# Patient Record
Sex: FEMALE | Race: WHITE | Employment: FULL TIME | URBAN - METROPOLITAN AREA
[De-identification: names, ages, dates, MRNs, and addresses within clinical notes are randomized per-mention and may not be internally consistent; named-entity substitution may affect disease eponyms.]

---

## 2017-01-16 ENCOUNTER — OFFICE VISIT (OUTPATIENT)
Dept: SLEEP MEDICINE | Age: 62
End: 2017-01-16

## 2017-01-16 VITALS
HEIGHT: 66 IN | SYSTOLIC BLOOD PRESSURE: 110 MMHG | DIASTOLIC BLOOD PRESSURE: 80 MMHG | BODY MASS INDEX: 37.28 KG/M2 | OXYGEN SATURATION: 94 % | HEART RATE: 85 BPM | WEIGHT: 232 LBS

## 2017-01-16 DIAGNOSIS — J44.9 COPD, SEVERE (HCC): Chronic | ICD-10-CM

## 2017-01-16 DIAGNOSIS — E66.9 NON MORBID OBESITY, UNSPECIFIED OBESITY TYPE: Chronic | ICD-10-CM

## 2017-01-16 DIAGNOSIS — G47.33 OSA (OBSTRUCTIVE SLEEP APNEA): Primary | Chronic | ICD-10-CM

## 2017-01-16 DIAGNOSIS — I42.9 CARDIOMYOPATHY (HCC): Chronic | ICD-10-CM

## 2017-01-16 PROCEDURE — 99214 OFFICE O/P EST MOD 30 MIN: CPT | Performed by: NURSE PRACTITIONER

## 2017-01-16 ASSESSMENT — ENCOUNTER SYMPTOMS
ABDOMINAL DISTENTION: 0
APNEA: 0
SINUS PRESSURE: 0
SHORTNESS OF BREATH: 0
ABDOMINAL PAIN: 0
RHINORRHEA: 0
COUGH: 0

## 2017-01-16 ASSESSMENT — SLEEP AND FATIGUE QUESTIONNAIRES
HOW LIKELY ARE YOU TO NOD OFF OR FALL ASLEEP IN A CAR, WHILE STOPPED FOR A FEW MINUTES IN TRAFFIC: 0
HOW LIKELY ARE YOU TO NOD OFF OR FALL ASLEEP WHILE SITTING QUIETLY AFTER LUNCH WITHOUT ALCOHOL: 0
HOW LIKELY ARE YOU TO NOD OFF OR FALL ASLEEP WHEN YOU ARE A PASSENGER IN A CAR FOR AN HOUR WITHOUT A BREAK: 1
HOW LIKELY ARE YOU TO NOD OFF OR FALL ASLEEP WHILE LYING DOWN TO REST IN THE AFTERNOON WHEN CIRCUMSTANCES PERMIT: 1
HOW LIKELY ARE YOU TO NOD OFF OR FALL ASLEEP WHILE SITTING INACTIVE IN A PUBLIC PLACE: 0
HOW LIKELY ARE YOU TO NOD OFF OR FALL ASLEEP WHILE SITTING AND READING: 0
ESS TOTAL SCORE: 3
HOW LIKELY ARE YOU TO NOD OFF OR FALL ASLEEP WHILE WATCHING TV: 1
HOW LIKELY ARE YOU TO NOD OFF OR FALL ASLEEP WHILE SITTING AND TALKING TO SOMEONE: 0

## 2017-01-24 ENCOUNTER — OFFICE VISIT (OUTPATIENT)
Dept: CARDIOLOGY CLINIC | Age: 62
End: 2017-01-24

## 2017-01-24 ENCOUNTER — PROCEDURE VISIT (OUTPATIENT)
Dept: CARDIOLOGY CLINIC | Age: 62
End: 2017-01-24

## 2017-01-24 ENCOUNTER — HOSPITAL ENCOUNTER (OUTPATIENT)
Dept: OTHER | Age: 62
Discharge: HOME | End: 2017-01-24
Attending: INTERNAL MEDICINE | Admitting: INTERNAL MEDICINE

## 2017-01-24 VITALS
WEIGHT: 231 LBS | SYSTOLIC BLOOD PRESSURE: 108 MMHG | BODY MASS INDEX: 37.12 KG/M2 | HEIGHT: 66 IN | DIASTOLIC BLOOD PRESSURE: 80 MMHG | HEART RATE: 76 BPM

## 2017-01-24 DIAGNOSIS — Z87.74 TETRALOGY OF FALLOT S/P REPAIR: Chronic | ICD-10-CM

## 2017-01-24 DIAGNOSIS — I50.22 CHRONIC SYSTOLIC CHF (CONGESTIVE HEART FAILURE) (HCC): Chronic | ICD-10-CM

## 2017-01-24 DIAGNOSIS — G47.33 OSA TREATED WITH BIPAP: Chronic | ICD-10-CM

## 2017-01-24 DIAGNOSIS — I50.22 CHRONIC SYSTOLIC CHF (CONGESTIVE HEART FAILURE) (HCC): Primary | Chronic | ICD-10-CM

## 2017-01-24 DIAGNOSIS — Z95.810 AUTOMATIC IMPLANTABLE CARDIOVERTER-DEFIBRILLATOR IN SITU: Chronic | ICD-10-CM

## 2017-01-24 DIAGNOSIS — J44.9 COPD, SEVERE (HCC): Chronic | ICD-10-CM

## 2017-01-24 DIAGNOSIS — Z95.810 AUTOMATIC IMPLANTABLE CARDIOVERTER-DEFIBRILLATOR IN SITU: ICD-10-CM

## 2017-01-24 DIAGNOSIS — I42.9 CARDIOMYOPATHY (HCC): Chronic | ICD-10-CM

## 2017-01-24 DIAGNOSIS — I50.22 CHRONIC SYSTOLIC CHF (CONGESTIVE HEART FAILURE) (HCC): ICD-10-CM

## 2017-01-24 LAB
A/G RATIO: 1 (ref 1.1–2.2)
ALBUMIN SERPL-MCNC: 3.6 G/DL (ref 3.4–5)
ALP BLD-CCNC: 119 U/L (ref 40–129)
ALT SERPL-CCNC: 16 U/L (ref 10–40)
ANION GAP SERPL CALCULATED.3IONS-SCNC: 23 MMOL/L (ref 3–16)
AST SERPL-CCNC: 14 U/L (ref 15–37)
BASOPHILS ABSOLUTE: 0.1 K/UL (ref 0–0.2)
BASOPHILS RELATIVE PERCENT: 1.3 %
BILIRUB SERPL-MCNC: 0.4 MG/DL (ref 0–1)
BUN BLDV-MCNC: 10 MG/DL (ref 7–20)
CALCIUM SERPL-MCNC: 9 MG/DL (ref 8.3–10.6)
CHLORIDE BLD-SCNC: 100 MMOL/L (ref 99–110)
CO2: 18 MMOL/L (ref 21–32)
CREAT SERPL-MCNC: 0.9 MG/DL (ref 0.6–1.2)
EOSINOPHILS ABSOLUTE: 0.4 K/UL (ref 0–0.6)
EOSINOPHILS RELATIVE PERCENT: 3.6 %
GFR AFRICAN AMERICAN: >60
GFR NON-AFRICAN AMERICAN: >60
GLOBULIN: 3.5 G/DL
GLUCOSE BLD-MCNC: 119 MG/DL (ref 70–99)
HCT VFR BLD CALC: 44.7 % (ref 36–48)
HEMOGLOBIN: 14.6 G/DL (ref 12–16)
LYMPHOCYTES ABSOLUTE: 2.3 K/UL (ref 1–5.1)
LYMPHOCYTES RELATIVE PERCENT: 20.5 %
MCH RBC QN AUTO: 28.3 PG (ref 26–34)
MCHC RBC AUTO-ENTMCNC: 32.6 G/DL (ref 31–36)
MCV RBC AUTO: 86.7 FL (ref 80–100)
MONOCYTES ABSOLUTE: 0.9 K/UL (ref 0–1.3)
MONOCYTES RELATIVE PERCENT: 7.9 %
NEUTROPHILS ABSOLUTE: 7.4 K/UL (ref 1.7–7.7)
NEUTROPHILS RELATIVE PERCENT: 66.7 %
PDW BLD-RTO: 14.2 % (ref 12.4–15.4)
PLATELET # BLD: 221 K/UL (ref 135–450)
PMV BLD AUTO: 8.8 FL (ref 5–10.5)
POTASSIUM SERPL-SCNC: 4.4 MMOL/L (ref 3.5–5.1)
PRO-BNP: 307 PG/ML (ref 0–124)
RBC # BLD: 5.15 M/UL (ref 4–5.2)
SODIUM BLD-SCNC: 141 MMOL/L (ref 136–145)
TOTAL PROTEIN: 7.1 G/DL (ref 6.4–8.2)
WBC # BLD: 11.1 K/UL (ref 4–11)

## 2017-01-24 PROCEDURE — 93290 INTERROG DEV EVAL ICPMS IP: CPT | Performed by: INTERNAL MEDICINE

## 2017-01-24 PROCEDURE — 99214 OFFICE O/P EST MOD 30 MIN: CPT | Performed by: INTERNAL MEDICINE

## 2017-01-24 PROCEDURE — 93289 INTERROG DEVICE EVAL HEART: CPT | Performed by: INTERNAL MEDICINE

## 2017-01-24 RX ORDER — CARVEDILOL 12.5 MG/1
TABLET ORAL
Qty: 180 TABLET | Refills: 3 | Status: SHIPPED | OUTPATIENT
Start: 2017-01-24 | End: 2018-02-12 | Stop reason: SDUPTHER

## 2017-01-24 RX ORDER — VALSARTAN 320 MG/1
320 TABLET ORAL NIGHTLY
Qty: 90 TABLET | Refills: 3
Start: 2017-01-24 | End: 2017-03-07 | Stop reason: SDUPTHER

## 2017-01-26 ENCOUNTER — TELEPHONE (OUTPATIENT)
Dept: CARDIOLOGY CLINIC | Age: 62
End: 2017-01-26

## 2017-01-30 ENCOUNTER — TELEPHONE (OUTPATIENT)
Dept: PULMONOLOGY | Age: 62
End: 2017-01-30

## 2017-01-30 RX ORDER — DOXYCYCLINE HYCLATE 100 MG/1
100 CAPSULE ORAL 2 TIMES DAILY
Qty: 20 CAPSULE | Refills: 0 | Status: SHIPPED | OUTPATIENT
Start: 2017-01-30 | End: 2017-02-09

## 2017-02-14 ENCOUNTER — TELEPHONE (OUTPATIENT)
Dept: CARDIOLOGY CLINIC | Age: 62
End: 2017-02-14

## 2017-02-22 ENCOUNTER — NURSE ONLY (OUTPATIENT)
Dept: CARDIOLOGY CLINIC | Age: 62
End: 2017-02-22

## 2017-02-22 DIAGNOSIS — Z95.810 CARDIAC DEFIBRILLATOR IN PLACE: ICD-10-CM

## 2017-02-22 DIAGNOSIS — I50.22 CHRONIC SYSTOLIC CHF (CONGESTIVE HEART FAILURE) (HCC): Chronic | ICD-10-CM

## 2017-02-22 DIAGNOSIS — I42.9 CARDIOMYOPATHY (HCC): Chronic | ICD-10-CM

## 2017-02-22 DIAGNOSIS — Z95.810 AUTOMATIC IMPLANTABLE CARDIOVERTER-DEFIBRILLATOR IN SITU: Chronic | ICD-10-CM

## 2017-02-22 PROCEDURE — 93296 REM INTERROG EVL PM/IDS: CPT | Performed by: INTERNAL MEDICINE

## 2017-02-22 PROCEDURE — 93297 REM INTERROG DEV EVAL ICPMS: CPT | Performed by: INTERNAL MEDICINE

## 2017-02-22 PROCEDURE — 93295 DEV INTERROG REMOTE 1/2/MLT: CPT | Performed by: INTERNAL MEDICINE

## 2017-03-02 RX ORDER — VALSARTAN 320 MG/1
TABLET ORAL
Qty: 90 TABLET | Refills: 3 | Status: SHIPPED | OUTPATIENT
Start: 2017-03-02 | End: 2018-02-12 | Stop reason: SDUPTHER

## 2017-03-07 ENCOUNTER — OFFICE VISIT (OUTPATIENT)
Dept: CARDIOLOGY CLINIC | Age: 62
End: 2017-03-07

## 2017-03-07 VITALS
WEIGHT: 231 LBS | DIASTOLIC BLOOD PRESSURE: 86 MMHG | HEART RATE: 78 BPM | BODY MASS INDEX: 37.12 KG/M2 | HEIGHT: 66 IN | RESPIRATION RATE: 89 BRPM | SYSTOLIC BLOOD PRESSURE: 116 MMHG

## 2017-03-07 DIAGNOSIS — Z87.74 TETRALOGY OF FALLOT S/P REPAIR: Chronic | ICD-10-CM

## 2017-03-07 DIAGNOSIS — E78.49 OTHER HYPERLIPIDEMIA: ICD-10-CM

## 2017-03-07 DIAGNOSIS — R06.02 SOB (SHORTNESS OF BREATH): ICD-10-CM

## 2017-03-07 DIAGNOSIS — I50.22 CHRONIC SYSTOLIC CHF (CONGESTIVE HEART FAILURE) (HCC): Primary | ICD-10-CM

## 2017-03-07 DIAGNOSIS — Z95.810 AUTOMATIC IMPLANTABLE CARDIOVERTER-DEFIBRILLATOR IN SITU: ICD-10-CM

## 2017-03-07 PROCEDURE — 93290 INTERROG DEV EVAL ICPMS IP: CPT | Performed by: INTERNAL MEDICINE

## 2017-03-07 PROCEDURE — 99214 OFFICE O/P EST MOD 30 MIN: CPT | Performed by: INTERNAL MEDICINE

## 2017-03-11 ENCOUNTER — HOSPITAL ENCOUNTER (OUTPATIENT)
Dept: OTHER | Age: 62
Discharge: HOME | End: 2017-03-11
Attending: INTERNAL MEDICINE | Admitting: INTERNAL MEDICINE

## 2017-03-11 DIAGNOSIS — I50.22 CHRONIC SYSTOLIC CHF (CONGESTIVE HEART FAILURE) (HCC): ICD-10-CM

## 2017-03-11 DIAGNOSIS — G47.33 OSA TREATED WITH BIPAP: Chronic | ICD-10-CM

## 2017-03-11 DIAGNOSIS — E78.49 OTHER HYPERLIPIDEMIA: ICD-10-CM

## 2017-03-11 DIAGNOSIS — E11.9 TYPE 2 DIABETES MELLITUS WITHOUT COMPLICATION, WITHOUT LONG-TERM CURRENT USE OF INSULIN (HCC): ICD-10-CM

## 2017-03-11 DIAGNOSIS — Z95.810 AUTOMATIC IMPLANTABLE CARDIOVERTER-DEFIBRILLATOR IN SITU: ICD-10-CM

## 2017-03-11 DIAGNOSIS — R06.02 SOB (SHORTNESS OF BREATH): ICD-10-CM

## 2017-03-11 LAB
A/G RATIO: 1.3 (ref 1.1–2.2)
ALBUMIN SERPL-MCNC: 3.9 G/DL (ref 3.4–5)
ALP BLD-CCNC: 113 U/L (ref 40–129)
ALT SERPL-CCNC: 18 U/L (ref 10–40)
ANION GAP SERPL CALCULATED.3IONS-SCNC: 13 MMOL/L (ref 3–16)
AST SERPL-CCNC: 17 U/L (ref 15–37)
BILIRUB SERPL-MCNC: 0.8 MG/DL (ref 0–1)
BUN BLDV-MCNC: 10 MG/DL (ref 7–20)
CALCIUM SERPL-MCNC: 9.3 MG/DL (ref 8.3–10.6)
CHLORIDE BLD-SCNC: 101 MMOL/L (ref 99–110)
CHOLESTEROL, TOTAL: 129 MG/DL (ref 0–199)
CO2: 26 MMOL/L (ref 21–32)
CREAT SERPL-MCNC: 0.9 MG/DL (ref 0.6–1.2)
GFR AFRICAN AMERICAN: >60
GFR NON-AFRICAN AMERICAN: >60
GLOBULIN: 2.9 G/DL
GLUCOSE BLD-MCNC: 146 MG/DL (ref 70–99)
HCT VFR BLD CALC: 43.6 % (ref 36–48)
HDLC SERPL-MCNC: 48 MG/DL (ref 40–60)
HEMOGLOBIN: 14.3 G/DL (ref 12–16)
LDL CHOLESTEROL CALCULATED: 56 MG/DL
MCH RBC QN AUTO: 28.6 PG (ref 26–34)
MCHC RBC AUTO-ENTMCNC: 32.8 G/DL (ref 31–36)
MCV RBC AUTO: 87.2 FL (ref 80–100)
PDW BLD-RTO: 14 % (ref 12.4–15.4)
PLATELET # BLD: 211 K/UL (ref 135–450)
PMV BLD AUTO: 8.9 FL (ref 5–10.5)
POTASSIUM SERPL-SCNC: 4.4 MMOL/L (ref 3.5–5.1)
PRO-BNP: 549 PG/ML (ref 0–124)
RBC # BLD: 5 M/UL (ref 4–5.2)
SODIUM BLD-SCNC: 140 MMOL/L (ref 136–145)
TOTAL PROTEIN: 6.8 G/DL (ref 6.4–8.2)
TRIGL SERPL-MCNC: 127 MG/DL (ref 0–150)
VLDLC SERPL CALC-MCNC: 25 MG/DL
WBC # BLD: 8.7 K/UL (ref 4–11)

## 2017-03-12 LAB
ESTIMATED AVERAGE GLUCOSE: 159.9 MG/DL
HBA1C MFR BLD: 7.2 %

## 2017-03-13 ENCOUNTER — TELEPHONE (OUTPATIENT)
Dept: CARDIOLOGY CLINIC | Age: 62
End: 2017-03-13

## 2017-04-05 RX ORDER — FLUTICASONE FUROATE AND VILANTEROL 200; 25 UG/1; UG/1
POWDER RESPIRATORY (INHALATION)
Qty: 60 EACH | Refills: 11 | Status: SHIPPED | OUTPATIENT
Start: 2017-04-05 | End: 2018-07-09 | Stop reason: SDUPTHER

## 2017-05-30 ENCOUNTER — NURSE ONLY (OUTPATIENT)
Dept: CARDIOLOGY CLINIC | Age: 62
End: 2017-05-30

## 2017-05-30 DIAGNOSIS — I50.22 CHRONIC SYSTOLIC CHF (CONGESTIVE HEART FAILURE) (HCC): Chronic | ICD-10-CM

## 2017-05-30 DIAGNOSIS — Z95.810 CARDIAC DEFIBRILLATOR IN PLACE: ICD-10-CM

## 2017-05-30 DIAGNOSIS — Z95.810 AUTOMATIC IMPLANTABLE CARDIOVERTER-DEFIBRILLATOR IN SITU: Chronic | ICD-10-CM

## 2017-05-30 DIAGNOSIS — I42.9 CARDIOMYOPATHY (HCC): Chronic | ICD-10-CM

## 2017-05-30 PROCEDURE — 93297 REM INTERROG DEV EVAL ICPMS: CPT | Performed by: INTERNAL MEDICINE

## 2017-05-30 PROCEDURE — 93295 DEV INTERROG REMOTE 1/2/MLT: CPT | Performed by: INTERNAL MEDICINE

## 2017-05-30 PROCEDURE — 93296 REM INTERROG EVL PM/IDS: CPT | Performed by: INTERNAL MEDICINE

## 2017-06-19 ENCOUNTER — OFFICE VISIT (OUTPATIENT)
Dept: PULMONOLOGY | Age: 62
End: 2017-06-19

## 2017-06-19 VITALS
BODY MASS INDEX: 36.32 KG/M2 | HEART RATE: 62 BPM | DIASTOLIC BLOOD PRESSURE: 70 MMHG | SYSTOLIC BLOOD PRESSURE: 126 MMHG | WEIGHT: 225 LBS

## 2017-06-19 DIAGNOSIS — J98.4 RESTRICTIVE LUNG DISEASE: ICD-10-CM

## 2017-06-19 DIAGNOSIS — G47.33 OSA TREATED WITH BIPAP: Chronic | ICD-10-CM

## 2017-06-19 DIAGNOSIS — J44.9 COPD, SEVERE (HCC): Primary | Chronic | ICD-10-CM

## 2017-06-19 DIAGNOSIS — J98.6 ELEVATED DIAPHRAGM: ICD-10-CM

## 2017-06-19 DIAGNOSIS — R49.0 HOARSENESS OF VOICE: ICD-10-CM

## 2017-06-19 DIAGNOSIS — I42.9 CARDIOMYOPATHY (HCC): Chronic | ICD-10-CM

## 2017-06-19 PROCEDURE — 99214 OFFICE O/P EST MOD 30 MIN: CPT | Performed by: INTERNAL MEDICINE

## 2017-07-21 ENCOUNTER — HOSPITAL ENCOUNTER (OUTPATIENT)
Dept: OTHER | Age: 62
Discharge: HOME | End: 2017-07-21
Attending: CLINICAL NURSE SPECIALIST | Admitting: CLINICAL NURSE SPECIALIST

## 2017-07-21 ENCOUNTER — TELEPHONE (OUTPATIENT)
Dept: CARDIOLOGY CLINIC | Age: 62
End: 2017-07-21

## 2017-07-21 ENCOUNTER — OFFICE VISIT (OUTPATIENT)
Dept: CARDIOLOGY CLINIC | Age: 62
End: 2017-07-21

## 2017-07-21 ENCOUNTER — HOSPITAL ENCOUNTER (OUTPATIENT)
Dept: NON INVASIVE DIAGNOSTICS | Age: 62
Discharge: HOME | End: 2017-07-21
Attending: INTERNAL MEDICINE | Admitting: INTERNAL MEDICINE

## 2017-07-21 VITALS
RESPIRATION RATE: 15 BRPM | BODY MASS INDEX: 37.73 KG/M2 | DIASTOLIC BLOOD PRESSURE: 60 MMHG | OXYGEN SATURATION: 93 % | HEIGHT: 66 IN | WEIGHT: 234.8 LBS | SYSTOLIC BLOOD PRESSURE: 118 MMHG | HEART RATE: 64 BPM

## 2017-07-21 DIAGNOSIS — G47.33 OSA TREATED WITH BIPAP: Chronic | ICD-10-CM

## 2017-07-21 DIAGNOSIS — I50.22 CHRONIC SYSTOLIC CHF (CONGESTIVE HEART FAILURE) (HCC): Primary | ICD-10-CM

## 2017-07-21 DIAGNOSIS — Z87.74 TETRALOGY OF FALLOT S/P REPAIR: Chronic | ICD-10-CM

## 2017-07-21 DIAGNOSIS — I50.22 CHRONIC SYSTOLIC CHF (CONGESTIVE HEART FAILURE) (HCC): ICD-10-CM

## 2017-07-21 DIAGNOSIS — I50.22 CHRONIC SYSTOLIC CONGESTIVE HEART FAILURE (HCC): ICD-10-CM

## 2017-07-21 DIAGNOSIS — E78.2 MIXED HYPERLIPIDEMIA: ICD-10-CM

## 2017-07-21 DIAGNOSIS — Z95.810 AUTOMATIC IMPLANTABLE CARDIOVERTER-DEFIBRILLATOR IN SITU: ICD-10-CM

## 2017-07-21 LAB
ANION GAP SERPL CALCULATED.3IONS-SCNC: 14 MMOL/L (ref 3–16)
BUN BLDV-MCNC: 12 MG/DL (ref 7–20)
CALCIUM SERPL-MCNC: 9.1 MG/DL (ref 8.3–10.6)
CHLORIDE BLD-SCNC: 102 MMOL/L (ref 99–110)
CO2: 27 MMOL/L (ref 21–32)
CREAT SERPL-MCNC: 0.9 MG/DL (ref 0.6–1.2)
GFR AFRICAN AMERICAN: >60
GFR NON-AFRICAN AMERICAN: >60
GLUCOSE BLD-MCNC: 149 MG/DL (ref 70–99)
POTASSIUM SERPL-SCNC: 4 MMOL/L (ref 3.5–5.1)
PRO-BNP: 476 PG/ML (ref 0–124)
SODIUM BLD-SCNC: 143 MMOL/L (ref 136–145)

## 2017-07-21 PROCEDURE — 99214 OFFICE O/P EST MOD 30 MIN: CPT | Performed by: CLINICAL NURSE SPECIALIST

## 2017-07-21 PROCEDURE — 93290 INTERROG DEV EVAL ICPMS IP: CPT | Performed by: CLINICAL NURSE SPECIALIST

## 2017-08-14 RX ORDER — FUROSEMIDE 20 MG/1
TABLET ORAL
Qty: 180 TABLET | Refills: 3 | Status: SHIPPED | OUTPATIENT
Start: 2017-08-14 | End: 2017-10-17 | Stop reason: SDUPTHER

## 2017-08-16 RX ORDER — FUROSEMIDE 20 MG/1
TABLET ORAL
Qty: 60 TABLET | Refills: 5 | Status: SHIPPED | OUTPATIENT
Start: 2017-08-16 | End: 2019-04-12 | Stop reason: SDUPTHER

## 2017-08-21 ENCOUNTER — PROCEDURE VISIT (OUTPATIENT)
Dept: CARDIOLOGY CLINIC | Age: 62
End: 2017-08-21

## 2017-08-21 ENCOUNTER — OFFICE VISIT (OUTPATIENT)
Dept: CARDIOLOGY CLINIC | Age: 62
End: 2017-08-21

## 2017-08-21 VITALS
WEIGHT: 234 LBS | HEIGHT: 66 IN | SYSTOLIC BLOOD PRESSURE: 130 MMHG | DIASTOLIC BLOOD PRESSURE: 72 MMHG | HEART RATE: 70 BPM | BODY MASS INDEX: 37.61 KG/M2

## 2017-08-21 DIAGNOSIS — I10 ESSENTIAL HYPERTENSION: ICD-10-CM

## 2017-08-21 DIAGNOSIS — Z95.810 AUTOMATIC IMPLANTABLE CARDIOVERTER-DEFIBRILLATOR IN SITU: Chronic | ICD-10-CM

## 2017-08-21 DIAGNOSIS — I50.22 CHRONIC SYSTOLIC CHF (CONGESTIVE HEART FAILURE) (HCC): Chronic | ICD-10-CM

## 2017-08-21 DIAGNOSIS — I42.9 PRIMARY CARDIOMYOPATHY (HCC): Chronic | ICD-10-CM

## 2017-08-21 DIAGNOSIS — I42.8 NON-ISCHEMIC CARDIOMYOPATHY (HCC): Primary | ICD-10-CM

## 2017-08-21 DIAGNOSIS — Z87.74 TETRALOGY OF FALLOT S/P REPAIR: Chronic | ICD-10-CM

## 2017-08-21 DIAGNOSIS — E66.09 NON MORBID OBESITY DUE TO EXCESS CALORIES: ICD-10-CM

## 2017-08-21 PROCEDURE — 93290 INTERROG DEV EVAL ICPMS IP: CPT | Performed by: INTERNAL MEDICINE

## 2017-08-21 PROCEDURE — 99214 OFFICE O/P EST MOD 30 MIN: CPT | Performed by: INTERNAL MEDICINE

## 2017-08-21 PROCEDURE — 93284 PRGRMG EVAL IMPLANTABLE DFB: CPT | Performed by: INTERNAL MEDICINE

## 2017-10-17 ENCOUNTER — OFFICE VISIT (OUTPATIENT)
Dept: CARDIOLOGY CLINIC | Age: 62
End: 2017-10-17

## 2017-10-17 VITALS
HEART RATE: 76 BPM | SYSTOLIC BLOOD PRESSURE: 102 MMHG | WEIGHT: 231 LBS | DIASTOLIC BLOOD PRESSURE: 54 MMHG | BODY MASS INDEX: 37.12 KG/M2 | HEIGHT: 66 IN

## 2017-10-17 DIAGNOSIS — J44.9 COPD, SEVERE (HCC): Chronic | ICD-10-CM

## 2017-10-17 DIAGNOSIS — R06.02 SOB (SHORTNESS OF BREATH): ICD-10-CM

## 2017-10-17 DIAGNOSIS — Z87.74 TETRALOGY OF FALLOT S/P REPAIR: Chronic | ICD-10-CM

## 2017-10-17 DIAGNOSIS — E78.2 MIXED HYPERLIPIDEMIA: ICD-10-CM

## 2017-10-17 DIAGNOSIS — I50.22 CHRONIC SYSTOLIC CHF (CONGESTIVE HEART FAILURE) (HCC): Primary | ICD-10-CM

## 2017-10-17 DIAGNOSIS — Z95.810 AUTOMATIC IMPLANTABLE CARDIOVERTER-DEFIBRILLATOR IN SITU: ICD-10-CM

## 2017-10-17 PROCEDURE — 93290 INTERROG DEV EVAL ICPMS IP: CPT | Performed by: INTERNAL MEDICINE

## 2017-10-17 PROCEDURE — 99214 OFFICE O/P EST MOD 30 MIN: CPT | Performed by: INTERNAL MEDICINE

## 2017-10-20 ENCOUNTER — HOSPITAL ENCOUNTER (OUTPATIENT)
Dept: OTHER | Age: 62
Discharge: HOME | End: 2017-10-20
Attending: INTERNAL MEDICINE | Admitting: INTERNAL MEDICINE

## 2017-10-20 DIAGNOSIS — I50.22 CHRONIC SYSTOLIC CHF (CONGESTIVE HEART FAILURE) (HCC): ICD-10-CM

## 2017-10-20 DIAGNOSIS — E78.2 MIXED HYPERLIPIDEMIA: ICD-10-CM

## 2017-10-20 DIAGNOSIS — R06.02 SOB (SHORTNESS OF BREATH): ICD-10-CM

## 2017-10-20 LAB
A/G RATIO: 1.3 (ref 1.1–2.2)
ALBUMIN SERPL-MCNC: 3.9 G/DL (ref 3.4–5)
ALP BLD-CCNC: 107 U/L (ref 40–129)
ALT SERPL-CCNC: 18 U/L (ref 10–40)
ANION GAP SERPL CALCULATED.3IONS-SCNC: 15 MMOL/L (ref 3–16)
AST SERPL-CCNC: 18 U/L (ref 15–37)
BASOPHILS ABSOLUTE: 0.1 K/UL (ref 0–0.2)
BASOPHILS RELATIVE PERCENT: 1.2 %
BILIRUB SERPL-MCNC: 0.7 MG/DL (ref 0–1)
BUN BLDV-MCNC: 13 MG/DL (ref 7–20)
CALCIUM SERPL-MCNC: 9.1 MG/DL (ref 8.3–10.6)
CHLORIDE BLD-SCNC: 98 MMOL/L (ref 99–110)
CHOLESTEROL, TOTAL: 132 MG/DL (ref 0–199)
CO2: 26 MMOL/L (ref 21–32)
CREAT SERPL-MCNC: 0.9 MG/DL (ref 0.6–1.2)
EOSINOPHILS ABSOLUTE: 0.3 K/UL (ref 0–0.6)
EOSINOPHILS RELATIVE PERCENT: 3 %
GFR AFRICAN AMERICAN: >60
GFR NON-AFRICAN AMERICAN: >60
GLOBULIN: 3 G/DL
GLUCOSE BLD-MCNC: 154 MG/DL (ref 70–99)
HCT VFR BLD CALC: 46.2 % (ref 36–48)
HDLC SERPL-MCNC: 48 MG/DL (ref 40–60)
HEMOGLOBIN: 15 G/DL (ref 12–16)
LDL CHOLESTEROL CALCULATED: 57 MG/DL
LYMPHOCYTES ABSOLUTE: 1.8 K/UL (ref 1–5.1)
LYMPHOCYTES RELATIVE PERCENT: 21 %
MCH RBC QN AUTO: 28.7 PG (ref 26–34)
MCHC RBC AUTO-ENTMCNC: 32.4 G/DL (ref 31–36)
MCV RBC AUTO: 88.5 FL (ref 80–100)
MONOCYTES ABSOLUTE: 0.7 K/UL (ref 0–1.3)
MONOCYTES RELATIVE PERCENT: 8.1 %
NEUTROPHILS ABSOLUTE: 5.8 K/UL (ref 1.7–7.7)
NEUTROPHILS RELATIVE PERCENT: 66.7 %
PDW BLD-RTO: 14.4 % (ref 12.4–15.4)
PLATELET # BLD: 202 K/UL (ref 135–450)
PMV BLD AUTO: 8.9 FL (ref 5–10.5)
POTASSIUM SERPL-SCNC: 4.8 MMOL/L (ref 3.5–5.1)
PRO-BNP: 399 PG/ML (ref 0–124)
RBC # BLD: 5.21 M/UL (ref 4–5.2)
SODIUM BLD-SCNC: 139 MMOL/L (ref 136–145)
TOTAL PROTEIN: 6.9 G/DL (ref 6.4–8.2)
TRIGL SERPL-MCNC: 137 MG/DL (ref 0–150)
VLDLC SERPL CALC-MCNC: 27 MG/DL
WBC # BLD: 8.7 K/UL (ref 4–11)

## 2017-11-29 ENCOUNTER — TELEPHONE (OUTPATIENT)
Dept: PULMONOLOGY | Age: 62
End: 2017-11-29

## 2017-11-29 ENCOUNTER — NURSE ONLY (OUTPATIENT)
Dept: CARDIOLOGY CLINIC | Age: 62
End: 2017-11-29

## 2017-11-29 DIAGNOSIS — I50.22 CHRONIC SYSTOLIC CHF (CONGESTIVE HEART FAILURE) (HCC): Chronic | ICD-10-CM

## 2017-11-29 DIAGNOSIS — Z95.810 CARDIAC DEFIBRILLATOR IN PLACE: ICD-10-CM

## 2017-11-29 DIAGNOSIS — I42.9 CARDIOMYOPATHY, UNSPECIFIED TYPE (HCC): Chronic | ICD-10-CM

## 2017-11-29 PROCEDURE — 93296 REM INTERROG EVL PM/IDS: CPT | Performed by: INTERNAL MEDICINE

## 2017-11-29 PROCEDURE — 93295 DEV INTERROG REMOTE 1/2/MLT: CPT | Performed by: INTERNAL MEDICINE

## 2017-11-29 PROCEDURE — 93297 REM INTERROG DEV EVAL ICPMS: CPT | Performed by: INTERNAL MEDICINE

## 2017-11-29 NOTE — LETTER
[address] [facility] [phone]  [address] [address] [place] [address] [phone]    Pacemaker/Defibrillator Clinic          11/29/17        Mercedes President  [address] [place] [address]        Dear Mercedes President    This letter is to inform you that we received the transmission from your monitor at home that checks your pacemaker and/or defibrillator, or implanted heart monitor. Everything is within normal limits. The next date your monitor will automatically transmit will be 3-6-18. Please do not send additional routine transmissions unless specifically requested. Your device and monitor are wireless and most transmit cellularly, but please periodically check your monitor is still plugged in to the electrical outlet. If you still use the telephone land line to send please ensure the connection to the phone willie is secure. This will help to ensure successful automatic transmissions in the future. Also, the monitor needs to be close to you while sleeping at night. Please be aware that the remote device transmission sites are periodically monitored only during regular business hours during which simultaneous in-office device clinics are being run. If your transmission requires attention, we will contact you as soon as possible. Thank you.             [facility]

## 2017-11-29 NOTE — TELEPHONE ENCOUNTER
She is c/o a productive cough, been going on for 2 or 3 weeks, its settling in her chest. She has tried expectorant and its not working. She wants to know if Dr. Yue Brian can call her something in. Please give her a call.

## 2017-11-30 ENCOUNTER — OFFICE VISIT (OUTPATIENT)
Dept: PULMONOLOGY | Age: 62
End: 2017-11-30

## 2017-11-30 VITALS
OXYGEN SATURATION: 95 % | RESPIRATION RATE: 16 BRPM | WEIGHT: 233 LBS | BODY MASS INDEX: 37.61 KG/M2 | SYSTOLIC BLOOD PRESSURE: 105 MMHG | DIASTOLIC BLOOD PRESSURE: 71 MMHG | HEART RATE: 78 BPM

## 2017-11-30 DIAGNOSIS — J44.1 COPD EXACERBATION (HCC): Primary | ICD-10-CM

## 2017-11-30 PROCEDURE — 99213 OFFICE O/P EST LOW 20 MIN: CPT | Performed by: INTERNAL MEDICINE

## 2017-11-30 RX ORDER — DOXYCYCLINE HYCLATE 100 MG
100 TABLET ORAL 2 TIMES DAILY
Qty: 20 TABLET | Refills: 0 | Status: SHIPPED | OUTPATIENT
Start: 2017-11-30 | End: 2017-12-10

## 2017-11-30 RX ORDER — PREDNISONE 10 MG/1
TABLET ORAL
Qty: 15 TABLET | Refills: 0 | Status: SHIPPED | OUTPATIENT
Start: 2017-11-30 | End: 2018-09-13 | Stop reason: ALTCHOICE

## 2017-11-30 ASSESSMENT — ENCOUNTER SYMPTOMS
WHEEZING: 0
CONSTIPATION: 0
ANAL BLEEDING: 0
CHEST TIGHTNESS: 1
APNEA: 0
STRIDOR: 0
ABDOMINAL PAIN: 0
COUGH: 1
DIARRHEA: 0
SINUS PRESSURE: 0
SORE THROAT: 0
ABDOMINAL DISTENTION: 0
CHOKING: 0
BLOOD IN STOOL: 0
VOICE CHANGE: 0
RHINORRHEA: 0

## 2017-11-30 NOTE — PROGRESS NOTES
Lauren Medici    YOB: 1955     Date of Service:  11/30/2017     Chief Complaint   Patient presents with    Cough     yellow phlegm       HPI patient is here for an acute visit for Dr. Shekhar Blanchard. Patient states that she has been coughing up yellow phlegm for 2-3 weeks. She has no fevers but some hot flashes. Has associated chest tightness with no shortness of breath. Denies any sick contacts. Allergies   Allergen Reactions    Lisinopril      Cough     Outpatient Prescriptions Marked as Taking for the 11/30/17 encounter (Office Visit) with Seble Mendoza MD   Medication Sig Dispense Refill    furosemide (LASIX) 20 MG tablet TAKE ONE TABLET BY MOUTH DAILY AND AN EXTRA DOSE AS NEEDED FOR SWELLING AND WEIGHT GAIN 60 tablet 5    Fluticasone Furoate-Vilanterol (BREO ELLIPTA) 200-25 MCG/INH AEPB INHALE ONE DOSE BY MOUTH DAILY 60 each 11    valsartan (DIOVAN) 320 MG tablet TAKE ONE TABLET BY MOUTH DAILY 90 tablet 3    carvedilol (COREG) 12.5 MG tablet 1 tab twice a day 180 tablet 3    fluticasone (FLONASE) 50 MCG/ACT nasal spray 2 sprays by Nasal route daily 1 Bottle 5    BiPAP Machine MISC by Does not apply route      albuterol (PROVENTIL HFA) 108 (90 BASE) MCG/ACT inhaler Inhale 2 puffs into the lungs every 4 hours as needed for Wheezing or Shortness of Breath 1 Inhaler 1    metFORMIN (GLUCOPHAGE) 500 MG tablet Take 500 mg by mouth daily (with breakfast).  atorvastatin (LIPITOR) 20 MG tablet Take 20 mg by mouth daily.  aspirin EC 81 MG EC tablet Take 1 tablet by mouth daily. 30 tablet 1    acetaminophen (TYLENOL) 325 MG tablet Take 650 mg by mouth every 6 hours as needed.            Immunization History   Administered Date(s) Administered    Influenza Vaccine, unspecified formulation 01/12/2016       Past Medical History:   Diagnosis Date    Bradycardia     Hyperlipidemia     Hypertension     SINTIA treated with BiPAP 8/18/2015    Stroke, hemorrhagic ([facility] [address] 5/2015  Type II or unspecified type diabetes mellitus without mention of complication, not stated as uncontrolled      Past Surgical History:   Procedure Laterality Date    CARDIAC CATHETERIZATION  5/2015    CARDIAC DEFIBRILLATOR PLACEMENT      CARDIAC DEFIBRILLATOR PLACEMENT  8/26/15    upgrade ICD to Biv-ICD    CHOLECYSTECTOMY      OVARY REMOVAL       Family History   Problem Relation Age of Onset    Diabetes Mother     High Cholesterol Mother     High Blood Pressure Mother     Heart Disease Father        Review of Systems:  Review of Systems   Constitutional: Negative for activity change, appetite change, fatigue and fever. HENT: Negative for congestion, ear discharge, ear pain, postnasal drip, rhinorrhea, sinus pressure, sneezing, sore throat, tinnitus and voice change. Respiratory: Positive for cough and chest tightness. Negative for apnea, choking, wheezing and stridor. Cardiovascular: Negative for chest pain, palpitations and leg swelling. Gastrointestinal: Negative for abdominal distention, abdominal pain, anal bleeding, blood in stool, constipation and diarrhea. Skin: Negative for pallor and rash. Allergic/Immunologic: Negative for environmental allergies. Neurological: Negative for dizziness, tremors, seizures, syncope, speech difficulty, weakness, light-headedness, numbness and headaches. Psychiatric/Behavioral: Negative for sleep disturbance. Vitals:    11/30/17 1521   BP: 105/71   Pulse: 78   Resp: 16   SpO2: 95%   Weight: 233 lb (105.7 kg)     Body mass index is 37.61 kg/m². Wt Readings from Last 3 Encounters:   11/30/17 233 lb (105.7 kg)   10/17/17 231 lb (104.8 kg)   08/21/17 234 lb (106.1 kg)     BP Readings from Last 3 Encounters:   11/30/17 105/71   10/17/17 (!) 102/54   08/21/17 130/72         Physical Exam   Constitutional: She is oriented to person, place, and time. She appears well-developed and well-nourished. No distress.    HENT:   Mouth/Throat: Oropharynx is clear and moist. No oropharyngeal exudate. Cardiovascular: Normal rate, regular rhythm, normal heart sounds and intact distal pulses. No murmur heard. Pulmonary/Chest: Breath sounds normal. No respiratory distress. She has no wheezes. She has no rales. She exhibits no tenderness. Abdominal: She exhibits no distension and no mass. There is no tenderness. There is no rebound and no guarding. Musculoskeletal: She exhibits no edema. Neurological: She is alert and oriented to person, place, and time. She displays normal reflexes. No cranial nerve deficit. She exhibits normal muscle tone. Coordination normal.   Skin: No rash noted. She is not diaphoretic. No erythema. No pallor. Health Maintenance   Topic Date Due    Hepatitis C screen  1955    Diabetic foot exam  07/18/1963    Diabetic retinal exam  07/18/1963    HIV screen  07/18/1968    Diabetic microalbuminuria test  07/18/1971    DTaP/Tdap/Td vaccine (1 - Tdap) 07/18/1972    Pneumococcal med risk (1 of 1 - PPSV23) 07/18/1972    Breast cancer screen  07/18/2003    Colon cancer screen colonoscopy  07/18/2003    Zostavax vaccine  07/18/2013    Cervical cancer screen  06/21/2014    Flu vaccine (1) 09/01/2017    Diabetic hemoglobin A1C test  03/11/2018    Lipid screen  10/20/2018          Assessment/Plan:    Patient has history of severe COPD and SINTIA, uses BiPAP at nighttime. Her current episode appears to be an infective exacerbation of COPD which we will treat with a course of prednisone and doxycycline which has been prescribed. We will hold off on chest x-ray unless needed. Patient also has a history of CHF of unclear etiology, history of tetralogy of Fallot, AICD placement. Follows up with Dr. Jessica Celaya. Patient has been asked to contact us if her symptoms do not improve in 2 weeks. No Follow-up on file.

## 2018-01-05 ENCOUNTER — OFFICE VISIT (OUTPATIENT)
Dept: PULMONOLOGY | Age: 63
End: 2018-01-05

## 2018-01-05 VITALS
HEART RATE: 58 BPM | SYSTOLIC BLOOD PRESSURE: 126 MMHG | WEIGHT: 236 LBS | DIASTOLIC BLOOD PRESSURE: 62 MMHG | BODY MASS INDEX: 38.09 KG/M2

## 2018-01-05 DIAGNOSIS — J44.9 COPD, SEVERE (HCC): Primary | Chronic | ICD-10-CM

## 2018-01-05 DIAGNOSIS — K44.9 HIATAL HERNIA: ICD-10-CM

## 2018-01-05 DIAGNOSIS — J98.4 RESTRICTIVE LUNG DISEASE: ICD-10-CM

## 2018-01-05 DIAGNOSIS — J98.6 ELEVATED DIAPHRAGM: ICD-10-CM

## 2018-01-05 DIAGNOSIS — G47.33 OSA TREATED WITH BIPAP: Chronic | ICD-10-CM

## 2018-01-05 PROCEDURE — 99214 OFFICE O/P EST MOD 30 MIN: CPT | Performed by: INTERNAL MEDICINE

## 2018-01-05 NOTE — LETTER
January 5, 2018       Patient: Caleb Herrera   MR Number: [id number]   YOB: 1955   Date of Visit: 1/5/2018       Dear Dr. Glo Rodriguez: Thank you for the request for consultation for Caleb Herrera to me for  evaluation. Below are the relevant portions of my assessment and plan of care. Assessment:       Plan:     If you have questions, please do not hesitate to call me. I look forward to following Maral Pierce along with you.     Sincerely,        Kathie Fournier MD                                                                    CC providers:  No Recipients

## 2018-01-05 NOTE — PROGRESS NOTES
[facility]  Progress Note  Brittnee Zelaya MD       Bird Bluemarleni   YOB: 1955    Date of Visit:  1/5/2018    Assessment/Plan:  1. COPD, severe ([address]  PFT 10/15:  Spirometry reveals decreased FVC at 1.95 L, which is 60% predicted. FEV1 is  decreased at 1.27 L, which is 49% predicted. FEV1/FVC ratio is decreased at  66%. There is no significant change after inhaled bronchodilators. Lung  volumes reveal decreased total lung capacity at 71% predicted. Vital  capacity is decreased at 60% predicted. Diffusion capacity is decreased at  53% predicted. IMPRESSION: Combined severe obstructive and moderate restrictive lung  disease    Breo  => Anoro 2/2 Hoarseness  Albuterol prn    2. Elevated diaphragm  Chronic  Stable    3. SINTIA treated with BiPAP  BiPAP  Stable    4. Cardiomyopathy ([address]  EF 25%  Sees Dr Dareen Mcburney    5. Hoarseness of voice  Stop the ICS. 6. Restrictive lung disease  2/2 obesity      FOLLOW UP: 6 months    HPI  The patient present for follow up of severe COPD and SINTIA. She uses BIPAP with good treatment effect. She also is using Breo. She did have a flare up and saw Dr JIMENEZ last month. She just drove 16 hours back from [place] and that seemed to take a lot out of her. She has some hoarseness which has been chronic. No Chest pain, Nausea or vomiting reported. Review of Systems  As documented in HPI     Allergies   Allergen Reactions    Lisinopril      Cough     Prior to Visit Medications    Medication Sig Taking?  Authorizing Provider   predniSONE (DELTASONE) 10 MG tablet Please take 2 tablets per day for 5 days then 1 tablet per day for 5 days then stop  Frida Perez MD   furosemide (LASIX) 20 MG tablet TAKE ONE TABLET BY MOUTH DAILY AND AN EXTRA DOSE AS NEEDED FOR SWELLING AND WEIGHT GAIN  Jarek Walker MD   Fluticasone Furoate-Vilanterol (BREO ELLIPTA) 200-25 MCG/INH AEPB INHALE ONE DOSE BY MOUTH DAILY  Trisha Black NP valsartan (DIOVAN) 320 MG tablet TAKE ONE TABLET BY MOUTH DAILY  Tomi Turcios MD   carvedilol (COREG) 12.5 MG tablet 1 tab twice a day  Tomi Turcios MD   fluticasone (FLONASE) 50 MCG/ACT nasal spray 2 sprays by Nasal route daily  Denisse James MD   BiPAP Machine MISC by Does not apply route  Historical Provider, MD   albuterol (PROVENTIL HFA) 108 (90 BASE) MCG/ACT inhaler Inhale 2 puffs into the lungs every 4 hours as needed for Wheezing or Shortness of Breath  Leobardo Carballo MD   metFORMIN (GLUCOPHAGE) 500 MG tablet Take 500 mg by mouth daily (with breakfast). Historical Provider, MD   atorvastatin (LIPITOR) 20 MG tablet Take 20 mg by mouth daily. Historical Provider, MD   aspirin EC 81 MG EC tablet Take 1 tablet by mouth daily. Luis Ortiz MD   acetaminophen (TYLENOL) 325 MG tablet Take 650 mg by mouth every 6 hours as needed. Historical Provider, MD       Vitals:    01/05/18 1552   BP: 126/62   Pulse: 58   Weight: 236 lb (107 kg)     Body mass index is 38.09 kg/m². Wt Readings from Last 3 Encounters:   01/05/18 236 lb (107 kg)   11/30/17 233 lb (105.7 kg)   10/17/17 231 lb (104.8 kg)     BP Readings from Last 3 Encounters:   01/05/18 126/62   11/30/17 105/71   10/17/17 (!) 102/54        History   Smoking Status    Never Smoker   Smokeless Tobacco    Never Used       Physical Exam:  Well developed, well nourished  Alert and oriented  Sclera is clear  No cervical adenopathy  No JVD. Chest examination is clear. Cardiac examination reveals regular rate and rhythm without murmur, gallop or rub. The abdomen is soft, nontender and nondistended. There is no clubbing, cyanosis or edema of the extremities. There is no obvious skin rash.   No focal neuro deficicts  Normal mood and affect

## 2018-01-08 ENCOUNTER — OFFICE VISIT (OUTPATIENT)
Dept: SLEEP MEDICINE | Age: 63
End: 2018-01-08

## 2018-01-08 VITALS
HEART RATE: 73 BPM | OXYGEN SATURATION: 94 % | WEIGHT: 237 LBS | BODY MASS INDEX: 38.09 KG/M2 | DIASTOLIC BLOOD PRESSURE: 92 MMHG | HEIGHT: 66 IN | SYSTOLIC BLOOD PRESSURE: 130 MMHG

## 2018-01-08 DIAGNOSIS — I42.9 CARDIOMYOPATHY, UNSPECIFIED TYPE (HCC): Chronic | ICD-10-CM

## 2018-01-08 DIAGNOSIS — E66.9 NON MORBID OBESITY, UNSPECIFIED OBESITY TYPE: Chronic | ICD-10-CM

## 2018-01-08 DIAGNOSIS — J44.9 COPD, SEVERE (HCC): Chronic | ICD-10-CM

## 2018-01-08 DIAGNOSIS — G47.33 OSA TREATED WITH BIPAP: Primary | Chronic | ICD-10-CM

## 2018-01-08 PROCEDURE — 99214 OFFICE O/P EST MOD 30 MIN: CPT | Performed by: NURSE PRACTITIONER

## 2018-01-08 ASSESSMENT — SLEEP AND FATIGUE QUESTIONNAIRES
HOW LIKELY ARE YOU TO NOD OFF OR FALL ASLEEP WHEN YOU ARE A PASSENGER IN A CAR FOR AN HOUR WITHOUT A BREAK: 1
HOW LIKELY ARE YOU TO NOD OFF OR FALL ASLEEP WHILE SITTING QUIETLY AFTER LUNCH WITHOUT ALCOHOL: 0
HOW LIKELY ARE YOU TO NOD OFF OR FALL ASLEEP IN A CAR, WHILE STOPPED FOR A FEW MINUTES IN TRAFFIC: 0
HOW LIKELY ARE YOU TO NOD OFF OR FALL ASLEEP WHILE SITTING AND TALKING TO SOMEONE: 0
HOW LIKELY ARE YOU TO NOD OFF OR FALL ASLEEP WHILE LYING DOWN TO REST IN THE AFTERNOON WHEN CIRCUMSTANCES PERMIT: 2
HOW LIKELY ARE YOU TO NOD OFF OR FALL ASLEEP WHILE SITTING AND READING: 1
ESS TOTAL SCORE: 5
HOW LIKELY ARE YOU TO NOD OFF OR FALL ASLEEP WHILE SITTING INACTIVE IN A PUBLIC PLACE: 0
HOW LIKELY ARE YOU TO NOD OFF OR FALL ASLEEP WHILE WATCHING TV: 1

## 2018-01-08 ASSESSMENT — ENCOUNTER SYMPTOMS
ABDOMINAL DISTENTION: 0
ABDOMINAL PAIN: 0
COUGH: 0
SHORTNESS OF BREATH: 0
RHINORRHEA: 0
APNEA: 0
SINUS PRESSURE: 0

## 2018-01-08 NOTE — PROGRESS NOTES
taking naps using the machine  No   Drowsy when driving  No   Does patient carry a DOT/CDL  No   Does patient carry FAA/Pil[employer] License   No    Any concerns noted with the machine at this time   No       Review of Systems   Constitutional: Negative for appetite change, chills, fatigue and fever. HENT: Negative for congestion, nosebleeds, rhinorrhea and sinus pressure. Respiratory: Negative for apnea, cough and shortness of breath. Cardiovascular: Negative for chest pain and palpitations. Gastrointestinal: Negative for abdominal distention and abdominal pain. Neurological: Negative for dizziness and headaches. Social History     Social History    Marital status:      Spouse name: N/A    Number of children: N/A    Years of education: N/A     Occupational History    Not on file. Social History Main Topics    Smoking status: Never Smoker    Smokeless tobacco: Never Used    Alcohol use No    Drug use: No    Sexual activity: Yes     Partners: Male     Other Topics Concern    Not on file     Social History Narrative    No narrative on file       Prior to Admission medications    Medication Sig Start Date End Date Taking?  Authorizing Provider   predniSONE (DELTASONE) 10 MG tablet Please take 2 tablets per day for 5 days then 1 tablet per day for 5 days then stop 11/30/17  Yes Barrington Martinez MD   furosemide (LASIX) 20 MG tablet TAKE ONE TABLET BY MOUTH DAILY AND AN EXTRA DOSE AS NEEDED FOR SWELLING AND WEIGHT GAIN 8/16/17  Yes Dorothy Delgado MD   Fluticasone Furoate-Vilanterol (BREO ELLIPTA) 200-25 MCG/INH AEPB INHALE ONE DOSE BY MOUTH DAILY 4/5/17  Yes Orie Common, NP   valsartan (DIOVAN) 320 MG tablet TAKE ONE TABLET BY MOUTH DAILY 3/2/17  Yes Dorothy Delgado MD   carvedilol (COREG) 12.5 MG tablet 1 tab twice a day 1/24/17  Yes Dorothy Delgado MD   fluticasone (FLONASE) 50 MCG/ACT nasal spray 2 sprays by Nasal route daily 7/14/16  Yes MD Yoselin Johnston Machine MISC by Does not apply route   Yes Historical Provider, MD   albuterol (PROVENTIL HFA) 108 (90 BASE) MCG/ACT inhaler Inhale 2 puffs into the lungs every 4 hours as needed for Wheezing or Shortness of Breath 10/30/15  Yes Flo Dennis MD   metFORMIN (GLUCOPHAGE) 500 MG tablet Take 500 mg by mouth daily (with breakfast). Yes Historical Provider, MD   atorvastatin (LIPITOR) 20 MG tablet Take 20 mg by mouth daily. Yes Historical Provider, MD   aspirin EC 81 MG EC tablet Take 1 tablet by mouth daily. 7/15/13  Yes Berta Casiano MD   acetaminophen (TYLENOL) 325 MG tablet Take 650 mg by mouth every 6 hours as needed.      Yes Historical Provider, MD       Allergies as of 01/08/2018 - Review Complete 01/08/2018   Allergen Reaction Noted    Lisinopril  07/14/2014       Patient Active Problem List   Diagnosis    Automatic implantable cardioverter-defibrillator in situ    Primary cardiomyopathy ([address]    Bradycardia    Tetralogy of Fallot s/p repair    Chronic systolic CHF (congestive heart failure) ([address]    Elevated diaphragm    Hiatal hernia    Hemorrhagic stroke ([address]    Cardiomyopathy ([address]    SINTIA treated with BiPAP    Fatigue    Presence of biventricular AICD    Acute respiratory failure with hypoxia ([address]    COPD, severe ([address]    Restrictive lung disease    Hoarseness of voice    Mixed hyperlipidemia       Past Medical History:   Diagnosis Date    Bradycardia     Hyperlipidemia     Hypertension     SINTIA treated with BiPAP 8/18/2015    Stroke, hemorrhagic ([address] 5/2015    Type II or unspecified type diabetes mellitus without mention of complication, not stated as uncontrolled        Past Surgical History:   Procedure Laterality Date    CARDIAC CATHETERIZATION  5/2015    CARDIAC DEFIBRILLATOR PLACEMENT      CARDIAC DEFIBRILLATOR PLACEMENT  8/26/15    upgrade ICD to Biv-ICD    CHOLECYSTECTOMY      OVARY REMOVAL         Family History   Problem Relation Age of Onset   

## 2018-02-12 ENCOUNTER — TELEPHONE (OUTPATIENT)
Dept: CARDIOLOGY CLINIC | Age: 63
End: 2018-02-12

## 2018-02-12 RX ORDER — VALSARTAN 320 MG/1
TABLET ORAL
Qty: 90 TABLET | Refills: 3 | Status: SHIPPED | OUTPATIENT
Start: 2018-02-12 | End: 2018-08-20 | Stop reason: ALTCHOICE

## 2018-02-12 RX ORDER — CARVEDILOL 12.5 MG/1
TABLET ORAL
Qty: 180 TABLET | Refills: 3 | Status: SHIPPED | OUTPATIENT
Start: 2018-02-12 | End: 2018-09-13 | Stop reason: SDUPTHER

## 2018-03-06 ENCOUNTER — NURSE ONLY (OUTPATIENT)
Dept: CARDIOLOGY CLINIC | Age: 63
End: 2018-03-06

## 2018-03-06 DIAGNOSIS — I50.22 CHRONIC SYSTOLIC CHF (CONGESTIVE HEART FAILURE) (HCC): Chronic | ICD-10-CM

## 2018-03-06 DIAGNOSIS — Z95.810 CARDIAC DEFIBRILLATOR IN PLACE: ICD-10-CM

## 2018-03-06 DIAGNOSIS — I42.9 PRIMARY CARDIOMYOPATHY (HCC): Chronic | ICD-10-CM

## 2018-03-06 PROCEDURE — 93297 REM INTERROG DEV EVAL ICPMS: CPT | Performed by: INTERNAL MEDICINE

## 2018-03-06 PROCEDURE — 93295 DEV INTERROG REMOTE 1/2/MLT: CPT | Performed by: INTERNAL MEDICINE

## 2018-03-06 PROCEDURE — 93296 REM INTERROG EVL PM/IDS: CPT | Performed by: INTERNAL MEDICINE

## 2018-03-06 NOTE — LETTER
[address] [place] [phone]  [address] [place] [address] [phone]    Pacemaker/Defibrillator Clinic          03/07/18        Canelo Greene  [address] [address]        Dear Canelo Greene    This letter is to inform you that we received the transmission from your monitor at home that checks your pacemaker and/or defibrillator, or implanted heart monitor. The next date your monitor will automatically transmit will be 6-19-18. Please do not send additional routine transmissions unless specifically requested. Your device and monitor are wireless and most transmit cellularly, but please periodically check your monitor is still plugged in to the electrical outlet. If you still use the telephone land line to send please ensure the connection to the phone willie is secure. This will help to ensure successful automatic transmissions in the future. Also, the monitor needs to be close to you while sleeping at night. Please be aware that the remote device transmission sites are periodically monitored only during regular business hours during which simultaneous in-office device clinics are being run. If your transmission requires attention, we will contact you as soon as possible. Thank you.             [facility]

## 2018-03-17 NOTE — PROGRESS NOTES
[address]   Advanced Heart Failure/Pulmonary Hypertension  Cardiac       Benigno Dunham  YOB: 1955    Date of Visit:  3/19/18    Chief Complaint   Patient presents with    Congestive Heart Failure     Optivol today   No cardiac complaints     History of Present Illness:  Mrs. Aundrea Husain is here for ongoing follow up. 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 She has h/o pacer for cardiomyopathy with EF 25%. She [facility]. Phil Corado at [facility] due to her TOF history. She underwent cardiopulmonary stress testing with him. He plans cardiac cath to make sure that her shunts are patent. He suggests she see a pulmonologist to rule out lung disease. She saw Dr. Mery Rodrigues for a pulmonary workup. She is on a Bi-pap. She sees Dr. Phil Corado and Dr. Mery Rodrigues. She had a stroke and was admitted at [facility] [place] treated and discharged with improvement. States that she had a bleed that affected her mildly with thinking and speech and was unable to drive; likely due to BP verses her taking a blood thinner; she has be approved to drive again and has returned to work after completing speech therapy. She has no residual. She was unable to get BP in arms until after age 25, was using leg BP's. Had [address] with optival implanted on 11/11/2015. Due to heart failure and wide paced QRS on 8/26/2015 she had Biv upgrade of her device. She has been tolerating her bipap machine. Her BP has been down historically, and she has been symptomatic. Does not smoke. Today, she has been doinfg well. She follow up at [facility] every two years. She has been working out with a  and has been exercising. She has lost weight ~ 8 pounds according to her epic chart. She continues to work. [employer] as a hobby. Her hemoglobin a1c was down to 6.8 from 7.1. We discussed her recent labs 3/2018 and her thyroid level is low and her PCP is managing it.  LE edema worsenes through the day, wears compression socks during the day, takes extra diuretic prn. Somewhat active. Her Optivol is stable with mild fluid fluctuations. She denies exertional chest pain, BOCANEGRA/PND, palpitations. Allergies   Allergen Reactions    Lisinopril      Cough     Current Outpatient Prescriptions   Medication Sig Dispense Refill    carvedilol (COREG) 12.5 MG tablet 1 tab twice a day 180 tablet 3    valsartan (DIOVAN) 320 MG tablet TAKE ONE TABLET BY MOUTH DAILY 90 tablet 3    predniSONE (DELTASONE) 10 MG tablet Please take 2 tablets per day for 5 days then 1 tablet per day for 5 days then stop 15 tablet 0    furosemide (LASIX) 20 MG tablet TAKE ONE TABLET BY MOUTH DAILY AND AN EXTRA DOSE AS NEEDED FOR SWELLING AND WEIGHT GAIN 60 tablet 5    Fluticasone Furoate-Vilanterol (BREO ELLIPTA) 200-25 MCG/INH AEPB INHALE ONE DOSE BY MOUTH DAILY 60 each 11    fluticasone (FLONASE) 50 MCG/ACT nasal spray 2 sprays by Nasal route daily 1 Bottle 5    BiPAP Machine MISC by Does not apply route      albuterol (PROVENTIL HFA) 108 (90 BASE) MCG/ACT inhaler Inhale 2 puffs into the lungs every 4 hours as needed for Wheezing or Shortness of Breath 1 Inhaler 1    metFORMIN (GLUCOPHAGE) 500 MG tablet Take 500 mg by mouth daily (with breakfast).  atorvastatin (LIPITOR) 20 MG tablet Take 20 mg by mouth daily.  aspirin EC 81 MG EC tablet Take 1 tablet by mouth daily. 30 tablet 1    acetaminophen (TYLENOL) 325 MG tablet Take 650 mg by mouth every 6 hours as needed. No current facility-administered medications for this visit.       Past Medical History:   Diagnosis Date    Bradycardia     Hyperlipidemia     Hypertension     SINTIA treated with BiPAP 8/18/2015    Stroke[facility] hemorrhagic ([facility] [address] 5/2015    Type II or unspecified type diabetes mellitus without mention of complication, not stated as uncontrolled      Past Surgical History:   Procedure Laterality Date    CARDIAC CATHETERIZATION  5/2015   [facility] CARDIAC DEFIBRILLATOR Examination:    BP (!) 98/58   Pulse 64   Ht 5' 6\" (1.676 m)   Wt 229 lb (103.9 kg)   BMI 36.96 kg/m²     Wt Readings from Last 3 Encounters:   01/08/18 237 lb (107.5 kg)   01/05/18 236 lb (107 kg)   11/30/17 233 lb (105.7 kg)     BP Readings from Last 3 Encounters:   01/08/18 (!) 130/92   01/05/18 126/62   11/30/17 105/71     Constitutional and General Appearance:   WD/WN in NAD  HEENT:  NC/AT  Respiratory:  · Normal excursion and expansion without use of accessory muscles  · Resp Auscultation: Normal breath sounds without dullness. Cardiovascular:  · The apical impulses not displaced  · Heart tones are crisp and normal  · Cervical veins are not engorged  · The carotid upstroke is normal in amplitude and contour without delay or bruit  · JVP less than 8 cm H2O  RRR with nl S1 and S2 without m,r,g  · Peripheral pulses are symmetrical and full  · There is no clubbing, cyanosis of the extremities. · 1+ edema LE, L>R  · Femoral Arteries: 2+ and equal  · Pedal Pulses: 2+ and equal   Abdomen:  · No masses or tenderness  · Liver/Spleen: No Abnormalities Noted  Neurological/Psychiatric:  · Alert and oriented in all spheres  · Moves all extremities well  · Exhibits normal gait balance and coordination  · No abnormalities of mood, affect, memory, mentation, or behavior are noted    ECHO 7/21/17> Technically difficult study due to lung interface and limited windows. Patient has history of Tetralogy of Fallot repair with 2 Jazmin-Taussig shunt and atrial septal defect repair. Normal left ventricle size. There is mild concentric left ventricular hypertrophy.   Left ventricular function difficult to estimate due to poor endocardial  definition but appears reduced, likely severely reduced and comparable to  previous two echos. Abnormal septal motion. The left atrium is dilated.   There is trivial tricuspid regurgitation with RVSP estimated at 19 mmHg.     [facility]  TOF s/p repair with non trans-annular

## 2018-03-19 ENCOUNTER — OFFICE VISIT (OUTPATIENT)
Dept: CARDIOLOGY CLINIC | Age: 63
End: 2018-03-19

## 2018-03-19 ENCOUNTER — TELEPHONE (OUTPATIENT)
Dept: PULMONOLOGY | Age: 63
End: 2018-03-19

## 2018-03-19 VITALS
HEART RATE: 64 BPM | DIASTOLIC BLOOD PRESSURE: 58 MMHG | BODY MASS INDEX: 36.8 KG/M2 | SYSTOLIC BLOOD PRESSURE: 98 MMHG | HEIGHT: 66 IN | WEIGHT: 229 LBS

## 2018-03-19 DIAGNOSIS — E78.2 MIXED HYPERLIPIDEMIA: ICD-10-CM

## 2018-03-19 DIAGNOSIS — I50.22 CHRONIC SYSTOLIC CHF (CONGESTIVE HEART FAILURE) (HCC): Primary | ICD-10-CM

## 2018-03-19 DIAGNOSIS — Z95.810 AUTOMATIC IMPLANTABLE CARDIOVERTER-DEFIBRILLATOR IN SITU: ICD-10-CM

## 2018-03-19 PROCEDURE — 93290 INTERROG DEV EVAL ICPMS IP: CPT | Performed by: INTERNAL MEDICINE

## 2018-03-19 PROCEDURE — 99214 OFFICE O/P EST MOD 30 MIN: CPT | Performed by: INTERNAL MEDICINE

## 2018-03-19 RX ORDER — ERGOCALCIFEROL 1.25 MG/1
50000 CAPSULE ORAL WEEKLY
Qty: 12 CAPSULE | Refills: 3 | Status: SHIPPED | OUTPATIENT
Start: 2018-03-19 | End: 2018-09-13 | Stop reason: SDUPTHER

## 2018-03-19 NOTE — LETTER
[address] [address] [address] [address]  Phone: [phone]  Fax: [phone]    Duncan Lewis MD        March 20, 2018     [facility], [place]  [address]    Patient: Vivek Belle  MR Number: [id number]  YOB: 1955  Date of Visit: 3/19/2018        [address]   Advanced Heart Failure/Pulmonary Hypertension  Cardiac       Vivek Belle  YOB: 1955    Date of Visit:  3/19/18    Chief Complaint   Patient presents with    Congestive Heart Failure     Optivol today   No cardiac complaints     History of Present Illness:  Mrs. Mary Garcia is here for ongoing follow up. 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 She has h/o pacer for cardiomyopathy with EF 25%. She saw Dr. Sherrill Oakley at [facility] due to her TOF history. She underwent cardiopulmonary stress testing with him. He plans cardiac cath to make sure that her shunts are patent. He suggests she see a pulmonologist to rule out lung disease. She saw Dr. Brennan Jackson for a pulmonary workup. She is on a Bi-pap. She sees Dr. Sherrill Oakley and Dr. Brennan Jackson. She had a stroke and was admitted at [facility] [place] treated and discharged with improvement. States that she had a bleed that affected her mildly with thinking and speech and was unable to drive; likely due to BP verses her taking a blood thinner; she has be approved to drive again and has returned to work after completing speech therapy. She has no residual. She was unable to get BP in arms until after age 25, was using leg BP's. Had [address] with optival implanted on 11/11/2015. Due to heart failure and wide paced QRS on 8/26/2015 she had Biv upgrade of her device. She has been tolerating her bipap machine. Her BP has been down historically, and she has been symptomatic. Does not smoke. Today, she has been doinfg well. She follow up at [facility] every two years. She has been working out with a  and has been exercising. She has lost weight ~ 8 pounds according to her epic chart. She continues to work. [employer] as a hobby. Her hemoglobin a1c was down to 6.8 from 7.1. We discussed her recent labs 3/2018 and her thyroid level is low and her PCP is managing it. LE edema worsenes through the day, wears compression socks during the day, takes extra diuretic prn. Somewhat active. Her Optivol is stable with mild fluid fluctuations. She denies exertional chest pain, BOCANEGRA/PND, palpitations. Allergies   Allergen Reactions    Lisinopril      Cough     Current Outpatient Prescriptions   Medication Sig Dispense Refill    carvedilol (COREG) 12.5 MG tablet 1 tab twice a day 180 tablet 3    valsartan (DIOVAN) 320 MG tablet TAKE ONE TABLET BY MOUTH DAILY 90 tablet 3    predniSONE (DELTASONE) 10 MG tablet Please take 2 tablets per day for 5 days then 1 tablet per day for 5 days then stop 15 tablet 0    furosemide (LASIX) 20 MG tablet TAKE ONE TABLET BY MOUTH DAILY AND AN EXTRA DOSE AS NEEDED FOR SWELLING AND WEIGHT GAIN 60 tablet 5    Fluticasone Furoate-Vilanterol (BREO ELLIPTA) 200-25 MCG/INH AEPB INHALE ONE DOSE BY MOUTH DAILY 60 each 11    fluticasone (FLONASE) 50 MCG/ACT nasal spray 2 sprays by Nasal route daily 1 Bottle 5    BiPAP Machine MISC by Does not apply route      albuterol (PROVENTIL HFA) 108 (90 BASE) MCG/ACT inhaler Inhale 2 puffs into the lungs every 4 hours as needed for Wheezing or Shortness of Breath 1 Inhaler 1    metFORMIN (GLUCOPHAGE) 500 MG tablet Take 500 mg by mouth daily (with breakfast).  atorvastatin (LIPITOR) 20 MG tablet Take 20 mg by mouth daily.  aspirin EC 81 MG EC tablet Take 1 tablet by mouth daily. 30 tablet 1    acetaminophen (TYLENOL) 325 MG tablet Take 650 mg by mouth every 6 hours as needed. No current facility-administered medications for this visit.       Past Medical History:   Diagnosis Date Vit d 50,000 iu weekly x 6 months and afterwards take 2000 iu OTC maintainence dose. Slip given for vit d,cbc, cmp, bnp, lipids before next visit. RTO in 6 months. I appreciate the opportunity of cooperating in the care of this patient. Naman Landa M.D., [id number] [place]  1. Tobacco Cessation Counseling: NA  2. Retake of BP if >140/90: NA  3. CAD patient on anti-platelet: NA but on ASA  4. CAD patient on STATIN therapy: NA but on lipitor  5. Patient with CHF and aFib on anticoagulation:  NA           If you have questions, please do not hesitate to call me. I look forward to following Katy Carlisle along with you.     Sincerely,        Amna Eid MD

## 2018-03-19 NOTE — TELEPHONE ENCOUNTER
REFILLS    Type of medication: BREO ELLIPTA    Dosage:200-25 MCG     How are you taking: inhaler    Pharmacy name and location: [address] [place] [address] phone number: p. [phone] f. [phone]      When was the last appointment?  1/5/2018

## 2018-03-20 NOTE — COMMUNICATION BODY
[address]   Advanced Heart Failure/Pulmonary Hypertension  Cardiac       Flochi Madden  YOB: 1955    Date of Visit:  3/19/18    Chief Complaint   Patient presents with    Congestive Heart Failure     Optivol today   No cardiac complaints     History of Present Illness:  Mrs. Melba Rodriguez is here for ongoing follow up. 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 She has h/o pacer for cardiomyopathy with EF 25%. She saw Dr. Jaqueline Rowell at [facility] due to her TOF history. She underwent cardiopulmonary stress testing with him. He plans cardiac cath to make sure that her shunts are patent. He suggests she see a pulmonologist to rule out lung disease. She saw Dr. Ximena Villarreal for a pulmonary workup. She is on a Bi-pap. She sees Dr. Jaqueline Rowell and Dr. Ximena Villarreal. She had a stroke and was admitted at [facility] [place] treated and discharged with improvement. States that she had a bleed that affected her mildly with thinking and speech and was unable to drive; likely due to BP verses her taking a blood thinner; she has be approved to drive again and has returned to work after completing speech therapy. She has no residual. She was unable to get BP in arms until after age 25, was using leg BP's. Had [address] with optival implanted on 11/11/2015. Due to heart failure and wide paced QRS on 8/26/2015 she had Biv upgrade of her device. She has been tolerating her bipap machine. Her BP has been down historically, and she has been symptomatic. Does not smoke. Today, she has been doinfg well. She follow up at [facility] every two years. She has been working out with a  and has been exercising. She has lost weight ~ 8 pounds according to her epic chart. She continues to work. [employer] as a hobby. Her hemoglobin a1c was down to 6.8 from 7.1. We discussed her recent labs 3/2018 and her thyroid level is low and her PCP is managing it.  LE edema worsenes through the day, wears patch infundibular resection (May 12, [facility])  Limited echocardiographic windows. Unable to quantify ventricular function. No residual ventricular septal defect  Mild tricuspid valve regurgitation  Right ventricle normal in size and systolic function moderately diminished  Paradoxical interventricular septum motion  No right ventricle outflow obstruction  Mild pulmonary valve regurgitation  Trivial mitral valve regurgitation  Left ventricle is mildly dilated and systolic function moderately diminished  No sign of pericardial effusion  Compared to previous echo right ventricle pressure slightly decreased    Echo(3/15) shows normal LV function. Assessment:    1. Chronic systolic CHF (congestive heart failure) ([address]): Compensated on exam.  ECHO 7/21/17> Relatively unchanged from 2016. ECHO 3/16/16  Etiology: Nonischemic, congenital, remote h/o 2 Hazel Canard shunts for Tetrology of Fallot/ASD repair  [place] 8/02-- Normal cors  Status: Compensated although mild LE edema noted  Echo @ [facility] 4/4/2016 compared with echo on 4/21/2015> the right ventricle pressures are slightly diminished. 2. Tetrology of Fallot /ASD:  Surgery at age 8 1/2     Following with [facility] Casey Aguero at [facility] for congential heart disease with us. 3. ICD (implantable cardiac defibrillator)/Optivol: upgraded 8/2015. Regular device checks. No shocks on recent interrogation. F/w Dr. Simran Lynn. 4.  SINTIA: wears Bipap  5. Hyperlipidemia:  and improving. Taking Lipitor 20 mg.    Plan:  Rev'd meds, labs. Vit d 50,000 iu weekly x 6 months and afterwards take 2000 iu OTC maintainence dose. Slip given for vit d,cbc, cmp, bnp, lipids before next visit. RTO in 6 months. I appreciate the opportunity of cooperating in the care of this patient. Shaun Smith M.D., [address] [place]  1. Tobacco Cessation Counseling: NA  2. Retake of BP if >140/90: NA  3. CAD patient on anti-platelet: NA but on ASA  4.  CAD patient on STATIN therapy: NA but on lipitor  5.  Patient with CHF and aFib on anticoagulation:  NA

## 2018-04-16 RX ORDER — CARVEDILOL 12.5 MG/1
TABLET ORAL
Qty: 60 TABLET | Refills: 5 | Status: SHIPPED | OUTPATIENT
Start: 2018-04-16 | End: 2018-09-13 | Stop reason: SDUPTHER

## 2018-06-19 ENCOUNTER — NURSE ONLY (OUTPATIENT)
Dept: CARDIOLOGY CLINIC | Age: 63
End: 2018-06-19

## 2018-06-19 DIAGNOSIS — Z95.810 CARDIAC DEFIBRILLATOR IN PLACE: ICD-10-CM

## 2018-06-19 DIAGNOSIS — I50.22 CHRONIC SYSTOLIC CHF (CONGESTIVE HEART FAILURE) (HCC): Chronic | ICD-10-CM

## 2018-06-19 DIAGNOSIS — I42.9 CARDIOMYOPATHY, UNSPECIFIED TYPE (HCC): Chronic | ICD-10-CM

## 2018-06-19 PROCEDURE — 93296 REM INTERROG EVL PM/IDS: CPT | Performed by: INTERNAL MEDICINE

## 2018-06-19 PROCEDURE — 93297 REM INTERROG DEV EVAL ICPMS: CPT | Performed by: INTERNAL MEDICINE

## 2018-06-19 PROCEDURE — 93295 DEV INTERROG REMOTE 1/2/MLT: CPT | Performed by: INTERNAL MEDICINE

## 2018-07-09 ENCOUNTER — OFFICE VISIT (OUTPATIENT)
Dept: PULMONOLOGY | Age: 63
End: 2018-07-09

## 2018-07-09 VITALS
SYSTOLIC BLOOD PRESSURE: 103 MMHG | DIASTOLIC BLOOD PRESSURE: 81 MMHG | WEIGHT: 229 LBS | HEART RATE: 80 BPM | BODY MASS INDEX: 36.96 KG/M2

## 2018-07-09 DIAGNOSIS — R49.0 HOARSENESS OF VOICE: ICD-10-CM

## 2018-07-09 DIAGNOSIS — J44.9 COPD, SEVERE (HCC): Primary | Chronic | ICD-10-CM

## 2018-07-09 DIAGNOSIS — J98.4 RESTRICTIVE LUNG DISEASE: ICD-10-CM

## 2018-07-09 DIAGNOSIS — J98.6 ELEVATED DIAPHRAGM: ICD-10-CM

## 2018-07-09 DIAGNOSIS — I42.9 CARDIOMYOPATHY, UNSPECIFIED TYPE (HCC): Chronic | ICD-10-CM

## 2018-07-09 DIAGNOSIS — G47.33 OSA TREATED WITH BIPAP: Chronic | ICD-10-CM

## 2018-07-09 PROCEDURE — 99214 OFFICE O/P EST MOD 30 MIN: CPT | Performed by: INTERNAL MEDICINE

## 2018-07-09 RX ORDER — ALBUTEROL SULFATE 90 UG/1
2 AEROSOL, METERED RESPIRATORY (INHALATION) EVERY 4 HOURS PRN
Qty: 1 INHALER | Refills: 1 | Status: SHIPPED | OUTPATIENT
Start: 2018-07-09 | End: 2020-08-26 | Stop reason: SDUPTHER

## 2018-07-09 RX ORDER — FLUTICASONE FUROATE AND VILANTEROL 200; 25 UG/1; UG/1
POWDER RESPIRATORY (INHALATION)
Qty: 60 EACH | Refills: 11 | Status: SHIPPED | OUTPATIENT
Start: 2018-07-09 | End: 2019-08-03 | Stop reason: SDUPTHER

## 2018-07-09 NOTE — PROGRESS NOTES
tablet Please take 2 tablets per day for 5 days then 1 tablet per day for 5 days then stop  Anita Ortiz MD   furosemide (LASIX) 20 MG tablet TAKE ONE TABLET BY MOUTH DAILY AND AN EXTRA DOSE AS NEEDED FOR SWELLING AND WEIGHT GAIN  Alysha Madden MD   Fluticasone Furoate-Vilanterol (BREO ELLIPTA) 200-25 MCG/INH AEPB INHALE ONE DOSE BY MOUTH DAILY  JEYSON Crisostomo - CNP   fluticasone (FLONASE) 50 MCG/ACT nasal spray 2 sprays by Nasal route daily  Duc Lynn MD   BiPAP Machine MISC by Does not apply route  Historical Provider, MD   albuterol (PROVENTIL HFA) 108 (90 BASE) MCG/ACT inhaler Inhale 2 puffs into the lungs every 4 hours as needed for Wheezing or Shortness of Breath  Shamar Rios MD   metFORMIN (GLUCOPHAGE) 500 MG tablet Take 500 mg by mouth daily (with breakfast). Historical Provider, MD   atorvastatin (LIPITOR) 20 MG tablet Take 20 mg by mouth daily. Historical Provider, MD   aspirin EC 81 MG EC tablet Take 1 tablet by mouth daily. Gurjit Britt MD   acetaminophen (TYLENOL) 325 MG tablet Take 650 mg by mouth every 6 hours as needed. Historical Provider, MD       Vitals:    07/09/18 1553   BP: 103/81   Pulse: 80   Weight: 229 lb (103.9 kg)     Body mass index is 36.96 kg/m². Wt Readings from Last 3 Encounters:   07/09/18 229 lb (103.9 kg)   03/19/18 229 lb (103.9 kg)   01/08/18 237 lb (107.5 kg)     BP Readings from Last 3 Encounters:   07/09/18 103/81   03/19/18 (!) 98/58   01/08/18 (!) 130/92        History   Smoking Status    Never Smoker   Smokeless Tobacco    Never Used       Physical Exam:  Well developed, well nourished  Alert and oriented  Sclera is clear  No cervical adenopathy  No JVD. Chest examination is clear. Cardiac examination reveals regular rate and rhythm without murmur, gallop or rub. The abdomen is soft, nontender and nondistended. There is no clubbing, cyanosis or edema of the extremities. There is no obvious skin rash.   No

## 2018-08-16 RX ORDER — FUROSEMIDE 20 MG/1
TABLET ORAL
Qty: 60 TABLET | Refills: 0 | Status: SHIPPED | OUTPATIENT
Start: 2018-08-16 | End: 2018-09-13 | Stop reason: SDUPTHER

## 2018-08-20 ENCOUNTER — PROCEDURE VISIT (OUTPATIENT)
Dept: CARDIOLOGY CLINIC | Age: 63
End: 2018-08-20

## 2018-08-20 ENCOUNTER — OFFICE VISIT (OUTPATIENT)
Dept: CARDIOLOGY CLINIC | Age: 63
End: 2018-08-20

## 2018-08-20 VITALS
WEIGHT: 232 LBS | BODY MASS INDEX: 37.28 KG/M2 | HEIGHT: 66 IN | HEART RATE: 84 BPM | SYSTOLIC BLOOD PRESSURE: 117 MMHG | DIASTOLIC BLOOD PRESSURE: 81 MMHG

## 2018-08-20 DIAGNOSIS — I50.22 CHRONIC SYSTOLIC CHF (CONGESTIVE HEART FAILURE) (HCC): ICD-10-CM

## 2018-08-20 DIAGNOSIS — E66.9 OBESITY, UNSPECIFIED CLASSIFICATION, UNSPECIFIED OBESITY TYPE, UNSPECIFIED WHETHER SERIOUS COMORBIDITY PRESENT: ICD-10-CM

## 2018-08-20 DIAGNOSIS — I42.9 PRIMARY CARDIOMYOPATHY (HCC): Chronic | ICD-10-CM

## 2018-08-20 DIAGNOSIS — I10 ESSENTIAL HYPERTENSION: ICD-10-CM

## 2018-08-20 DIAGNOSIS — I42.9 CARDIOMYOPATHY, UNSPECIFIED TYPE (HCC): Primary | Chronic | ICD-10-CM

## 2018-08-20 DIAGNOSIS — Z95.810 AUTOMATIC IMPLANTABLE CARDIOVERTER-DEFIBRILLATOR IN SITU: ICD-10-CM

## 2018-08-20 PROCEDURE — 99214 OFFICE O/P EST MOD 30 MIN: CPT | Performed by: INTERNAL MEDICINE

## 2018-08-20 PROCEDURE — 93284 PRGRMG EVAL IMPLANTABLE DFB: CPT | Performed by: INTERNAL MEDICINE

## 2018-08-20 PROCEDURE — 93290 INTERROG DEV EVAL ICPMS IP: CPT | Performed by: INTERNAL MEDICINE

## 2018-08-20 RX ORDER — LOSARTAN POTASSIUM 100 MG/1
100 TABLET ORAL DAILY
COMMUNITY
End: 2022-09-15 | Stop reason: ALTCHOICE

## 2018-08-20 NOTE — PATIENT INSTRUCTIONS
Patient Education        Broken Heart Syndrome: Care Instructions  Your Care Instructions    With broken heart syndrome, the heart has trouble pumping blood normally. A chamber of the heart swells up like a small balloon. Broken heart syndrome is also called stress-induced cardiomyopathy or takotsubo cardiomyopathy (say \"Sandra cochran-oh-zr-EWL-ac-jadiel\"). Broken heart syndrome is often triggered by great emotional stress, such as grief after losing a loved one. It can also be triggered by physical stress. Sometimes the cause is not known. Broken heart syndrome causes the same symptoms as a heart attack, but it's not a heart attack. Some of the most common symptoms are:  · Sudden chest pain. · Shortness of breath. · Fainting. Other symptoms may include a pounding or fast heartbeat, nausea, or vomiting. A heart attack happens when one or more of the coronary arteries is blocked. These arteries supply blood to the heart muscle. When blood flow is blocked, the heart muscle may be permanently damaged. But in broken heart syndrome, the arteries aren't blocked. There is usually no permanent damage to the heart. You will likely take medicines for a short time to help your heart muscle recover. These may include medicines that make it easier for your heart to pump blood. Some people may need to take medicines long-term. In most people, the heart starts pumping normally again within a few days or weeks. For some people, it can take several months to return to normal.  Follow-up care is a key part of your treatment and safety. Be sure to make and go to all appointments, and call your doctor if you are having problems. It's also a good idea to know your test results and keep a list of the medicines you take. How can you care for yourself at home? · Be safe with medicines. Take your medicines exactly as prescribed. Call your doctor if you think you are having a problem with your medicine.   · If you take a pounds in a day or 5 pounds in a week. (Your doctor may suggest a different range of weight gain.)  ¨ Feeling dizzy or lightheaded or like you may faint. ¨ Feeling so tired or weak that you cannot do your usual activities. ¨ Not sleeping well. Shortness of breath wakes you at night. You need extra pillows to prop yourself up to breathe easier.    Watch closely for changes in your health, and be sure to contact your doctor if you have any problems. Where can you learn more? Go to [email or web address]://[email or web address] org and sign in to your [employer] account. Enter K546 in the [employer] box to learn more about \"Broken Heart Syndrome: Care Instructions. \"     If you do not have an account, please click on the \"Sign Up Now\" link. Current as of: December 6, 2017  Content Version: 11.7  © 1037-7383 [employer]. Care instructions adapted under license by [facility] ([facility]). If you have questions about a medical condition or this instruction, always ask your healthcare professional. [address] any warranty or liability for your use of this information.

## 2018-08-24 ENCOUNTER — HOSPITAL ENCOUNTER (OUTPATIENT)
Dept: OTHER | Age: 63
Discharge: HOME | End: 2018-08-24
Attending: INTERNAL MEDICINE | Admitting: INTERNAL MEDICINE

## 2018-08-24 DIAGNOSIS — I50.22 CHRONIC SYSTOLIC CHF (CONGESTIVE HEART FAILURE) (HCC): ICD-10-CM

## 2018-08-24 DIAGNOSIS — Z95.810 AUTOMATIC IMPLANTABLE CARDIOVERTER-DEFIBRILLATOR IN SITU: ICD-10-CM

## 2018-08-24 DIAGNOSIS — E78.2 MIXED HYPERLIPIDEMIA: ICD-10-CM

## 2018-08-24 LAB
A/G RATIO: 1.4 (ref 1.1–2.2)
ALBUMIN SERPL-MCNC: 4.1 G/DL (ref 3.4–5)
ALP BLD-CCNC: 108 U/L (ref 40–129)
ALT SERPL-CCNC: 14 U/L (ref 10–40)
ANION GAP SERPL CALCULATED.3IONS-SCNC: 12 MMOL/L (ref 3–16)
AST SERPL-CCNC: 15 U/L (ref 15–37)
BASOPHILS ABSOLUTE: 0 K/UL (ref 0–0.2)
BASOPHILS RELATIVE PERCENT: 0.5 %
BILIRUB SERPL-MCNC: 0.8 MG/DL (ref 0–1)
BUN BLDV-MCNC: 14 MG/DL (ref 7–20)
CALCIUM SERPL-MCNC: 9.6 MG/DL (ref 8.3–10.6)
CHLORIDE BLD-SCNC: 100 MMOL/L (ref 99–110)
CHOLESTEROL, TOTAL: 136 MG/DL (ref 0–199)
CO2: 29 MMOL/L (ref 21–32)
CREAT SERPL-MCNC: 1 MG/DL (ref 0.6–1.2)
EOSINOPHILS ABSOLUTE: 0.2 K/UL (ref 0–0.6)
EOSINOPHILS RELATIVE PERCENT: 2.6 %
GFR AFRICAN AMERICAN: >60
GFR NON-AFRICAN AMERICAN: 56
GLOBULIN: 3 G/DL
GLUCOSE BLD-MCNC: 159 MG/DL (ref 70–99)
HCT VFR BLD CALC: 45.7 % (ref 36–48)
HDLC SERPL-MCNC: 50 MG/DL (ref 40–60)
HEMOGLOBIN: 15.1 G/DL (ref 12–16)
LDL CHOLESTEROL CALCULATED: 64 MG/DL
LYMPHOCYTES ABSOLUTE: 1.8 K/UL (ref 1–5.1)
LYMPHOCYTES RELATIVE PERCENT: 20.3 %
MCH RBC QN AUTO: 28.9 PG (ref 26–34)
MCHC RBC AUTO-ENTMCNC: 33 G/DL (ref 31–36)
MCV RBC AUTO: 87.5 FL (ref 80–100)
MONOCYTES ABSOLUTE: 0.7 K/UL (ref 0–1.3)
MONOCYTES RELATIVE PERCENT: 8.1 %
NEUTROPHILS ABSOLUTE: 6.2 K/UL (ref 1.7–7.7)
NEUTROPHILS RELATIVE PERCENT: 68.5 %
PDW BLD-RTO: 14 % (ref 12.4–15.4)
PLATELET # BLD: 211 K/UL (ref 135–450)
PMV BLD AUTO: 8.7 FL (ref 5–10.5)
POTASSIUM SERPL-SCNC: 4.9 MMOL/L (ref 3.5–5.1)
PRO-BNP: 453 PG/ML (ref 0–124)
RBC # BLD: 5.22 M/UL (ref 4–5.2)
SODIUM BLD-SCNC: 141 MMOL/L (ref 136–145)
TOTAL PROTEIN: 7.1 G/DL (ref 6.4–8.2)
TRIGL SERPL-MCNC: 108 MG/DL (ref 0–150)
VITAMIN D 25-HYDROXY: 42.3 NG/ML
VLDLC SERPL CALC-MCNC: 22 MG/DL
WBC # BLD: 9 K/UL (ref 4–11)

## 2018-09-11 ENCOUNTER — HOSPITAL ENCOUNTER (OUTPATIENT)
Dept: NON INVASIVE DIAGNOSTICS | Age: 63
Discharge: HOME | End: 2018-09-11
Attending: INTERNAL MEDICINE | Admitting: INTERNAL MEDICINE

## 2018-09-11 DIAGNOSIS — I42.9 CARDIOMYOPATHY (HCC): ICD-10-CM

## 2018-09-11 DIAGNOSIS — I42.9 CARDIOMYOPATHY, UNSPECIFIED TYPE (HCC): Chronic | ICD-10-CM

## 2018-09-11 LAB
LEFT VENTRICULAR EJECTION FRACTION HIGH VALUE: 45 %
LEFT VENTRICULAR EJECTION FRACTION MODE: NORMAL
LV EF: 40 %

## 2018-09-13 ENCOUNTER — OFFICE VISIT (OUTPATIENT)
Dept: CARDIOLOGY CLINIC | Age: 63
End: 2018-09-13

## 2018-09-13 VITALS
RESPIRATION RATE: 16 BRPM | HEIGHT: 66 IN | DIASTOLIC BLOOD PRESSURE: 60 MMHG | OXYGEN SATURATION: 91 % | WEIGHT: 230.8 LBS | SYSTOLIC BLOOD PRESSURE: 114 MMHG | BODY MASS INDEX: 37.09 KG/M2 | HEART RATE: 70 BPM

## 2018-09-13 DIAGNOSIS — Z95.810 AUTOMATIC IMPLANTABLE CARDIOVERTER-DEFIBRILLATOR IN SITU: ICD-10-CM

## 2018-09-13 DIAGNOSIS — I50.22 CHRONIC SYSTOLIC CHF (CONGESTIVE HEART FAILURE) (HCC): Primary | ICD-10-CM

## 2018-09-13 DIAGNOSIS — Z87.74 TETRALOGY OF FALLOT S/P REPAIR: Chronic | ICD-10-CM

## 2018-09-13 DIAGNOSIS — E55.9 VITAMIN D INSUFFICIENCY: ICD-10-CM

## 2018-09-13 DIAGNOSIS — E78.2 MIXED HYPERLIPIDEMIA: ICD-10-CM

## 2018-09-13 DIAGNOSIS — G47.33 OSA TREATED WITH BIPAP: Chronic | ICD-10-CM

## 2018-09-13 PROCEDURE — 93290 INTERROG DEV EVAL ICPMS IP: CPT | Performed by: INTERNAL MEDICINE

## 2018-09-13 PROCEDURE — 99214 OFFICE O/P EST MOD 30 MIN: CPT | Performed by: INTERNAL MEDICINE

## 2018-09-13 RX ORDER — CHOLECALCIFEROL (VITAMIN D3) 50 MCG
2000 TABLET ORAL DAILY
Qty: 90 TABLET | Refills: 3 | COMMUNITY
Start: 2018-09-13 | End: 2019-10-14

## 2018-09-13 RX ORDER — ERGOCALCIFEROL 1.25 MG/1
50000 CAPSULE ORAL WEEKLY
Qty: 12 CAPSULE | Refills: 3 | Status: SHIPPED | OUTPATIENT
Start: 2018-09-13 | End: 2019-04-12 | Stop reason: ALTCHOICE

## 2018-09-13 RX ORDER — CARVEDILOL 12.5 MG/1
TABLET ORAL
Qty: 60 TABLET | Refills: 5 | Status: SHIPPED | OUTPATIENT
Start: 2018-09-13 | End: 2019-04-12 | Stop reason: SDUPTHER

## 2018-09-13 NOTE — PROGRESS NOTES
[address]   Advanced Heart Failure/Pulmonary Hypertension  Cardiac       Awa Villarreal  YOB: 1955    Date of Visit:  9/13/18    Chief Complaint   Patient presents with    3 Month Follow-Up     Denies cardiac symptoms    Congestive Heart Failure    Hyperlipidemia      History of Present Illness:  Mrs. Fady Hagan is here for ongoing follow up. 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 She has h/o pacer for cardiomyopathy with EF 25%. She saw Dr. Simi Vallejo at [facility] due to her TOF history. She underwent cardiopulmonary stress testing with him. He plans cardiac cath to make sure that her shunts are patent. He suggests she see a pulmonologist to rule out lung disease. She saw Dr. Tim Avila for a pulmonary workup. She is on a Bi-pap. She had a stroke and was admitted at [facility] [place] treated and discharged with improvement. States that she had a bleed that affected her mildly with thinking and speech and was unable to drive; likely due to BP verses her taking a blood thinner;  . Had [address] with optival implanted on 11/11/2015. Due to heart failure and wide paced QRS on 8/26/2015 she had Biv upgrade of her device. Her BP has been down historically, and she has been symptomatic. She has been tolerating her bipap machine. Today, she is dong well. Has recent echo and her EF  40-45% better. She has no cardiac complaints.  is continuing her vit d supplements 50 000 iu weekly. Breahting has been good. No swelling, sob or palpitations. Has not been to [facility] in 2 years. Has been following . Somewhat active, does not smoke.     Allergies   Allergen Reactions    Lisinopril      Cough     Current Outpatient Prescriptions   Medication Sig Dispense Refill    losartan (COZAAR) 100 MG tablet Take 100 mg by mouth daily      Fluticasone Furoate-Vilanterol (BREO ELLIPTA) 200-25 MCG/INH AEPB INHALE ONE DOSE BY MOUTH DAILY 60 each 11    albuterol 07/09/18 103/81     Constitutional and General Appearance:   WD/WN in NAD  HEENT:  NC/AT  Respiratory:  · Normal excursion and expansion without use of accessory muscles  · Resp Auscultation: Normal breath sounds without dullness. Cardiovascular:  · The apical impulses not displaced  · Heart tones are crisp and normal  · Cervical veins are not engorged  · The carotid upstroke is normal in amplitude and contour without delay or bruit  · JVP less than 8 cm H2O  RRR with nl S1 and S2 without m,r,g  · Peripheral pulses are symmetrical and full  · There is no clubbing, cyanosis of the extremities. · 1+ edema LE, L>R  · Femoral Arteries: 2+ and equal  · Pedal Pulses: 2+ and equal   Abdomen:  · No masses or tenderness  · Liver/Spleen: No Abnormalities Noted  Neurological/Psychiatric:  · Alert and oriented in all spheres  · Moves all extremities well  · Exhibits normal gait balance and coordination  · No abnormalities of mood, affect, memory, mentation, or behavior are noted    Diagnostics:    ECHO 7/21/17-- Technically difficult study due to lung interface and limited windows. Patient has history of Tetralogy of Fallot repair with 2 Jazmin-Taussig shunt and atrial septal defect repair. Normal left ventricle size. There is mild concentric left ventricular hypertrophy.   Left ventricular function difficult to estimate due to poor endocardial  definition but appears reduced, likely severely reduced and comparable to  previous two echos. Abnormal septal motion. The left atrium is dilated.   There is trivial tricuspid regurgitation with RVSP estimated at 19 mmHg. [facility]  TOF s/p repair with non trans-annular patch infundibular resection (May [birth year], 1963 [facility])  Limited echocardiographic windows. Unable to quantify ventricular function.   No residual ventricular septal defect  Mild tricuspid valve regurgitation  Right ventricle normal in size and systolic function moderately diminished  Paradoxical interventricular septum motion  No right ventricle outflow obstruction  Mild pulmonary valve regurgitation  Trivial mitral valve regurgitation  Left ventricle is mildly dilated and systolic function moderately diminished  No sign of pericardial effusion  Compared to previous echo right ventricle pressure slightly decreased    Echo(3/15) shows normal LV function. Assessment:    1. Chronic systolic CHF (congestive heart failure) ([address]): Compensated. ECHO 7/21/17-- Relatively unchanged from 2016. ECHO 3/16/16  Etiology: Nonischemic, congenital, remote h/o 2 Arlander Feast shunts for Tetrology of Fallot/ASD repair  [facility] 8/02> Normal cors  Status: Compensated although mild LE edema noted  Echo @ [facility] 4/4/2016 compared with echo on 4/21/2015> the right ventricle pressures are slightly diminished. 2. Tetrology of Fallot /ASD:  Surgery at age 8 1/2     Following with Dr. Emmy Phelps at [facility] for congential heart disease with us. 3. ICD (implantable cardiac defibrillator)/Optivol: upgraded 8/2015. Regular device checks. F/w Dr. Padilla Lock. 4.  SINTIA: wears Bipap  5. Hyperlipidemia: Taking Lipitor. Plan:  Rev'd meds, labs. Slip given for cbc, cmp, bnp, lipids, vit d soon. RTO in 6 months. Scribe's attestation: This note was scribed in the presence of Ashly Lau M.D. by Harsh Reddy RN    The scribe's documentation has been prepared under my direction and personally reviewed by me in its entirety. I confirm that the note above accurately reflects all work, treatment, procedures, and medical decision making performed by me.       QUALITY MEASURES  1. Tobacco Cessation Counseling: NA  2. Retake of BP if >140/90: NA  3. CAD patient on anti-platelet: NA but on ASA  4. CAD patient on STATIN therapy: NA but on lipitor  5. Patient with CHF and aFib on anticoagulation:  NA      I appreciate the opportunity of cooperating in the care of this patient.     Ashly Lau M.D., [facility]

## 2018-09-13 NOTE — LETTER
not been to [facility] in 2 years. Has been following . Somewhat active, does not smoke. Allergies   Allergen Reactions    Lisinopril      Cough     Current Outpatient Prescriptions   Medication Sig Dispense Refill    losartan (COZAAR) 100 MG tablet Take 100 mg by mouth daily      Fluticasone Furoate-Vilanterol (BREO ELLIPTA) 200-25 MCG/INH AEPB INHALE ONE DOSE BY MOUTH DAILY 60 each 11    albuterol sulfate HFA (PROVENTIL HFA) 108 (90 Base) MCG/ACT inhaler Inhale 2 puffs into the lungs every 4 hours as needed for Wheezing or Shortness of Breath 1 Inhaler 1    carvedilol (COREG) 12.5 MG tablet TAKE ONE TABLET BY MOUTH TWICE A DAY 60 tablet 5    vitamin D (ERGOCALCIFEROL) 21553 units CAPS capsule Take 1 capsule by mouth once a week 12 capsule 3    furosemide (LASIX) 20 MG tablet TAKE ONE TABLET BY MOUTH DAILY AND AN EXTRA DOSE AS NEEDED FOR SWELLING AND WEIGHT GAIN 60 tablet 5    fluticasone (FLONASE) 50 MCG/ACT nasal spray 2 sprays by Nasal route daily (Patient taking differently: 2 sprays by Nasal route daily as needed ) 1 Bottle 5    BiPAP Machine MISC by Does not apply route      metFORMIN (GLUCOPHAGE) 500 MG tablet Take 500 mg by mouth daily (with breakfast).  atorvastatin (LIPITOR) 20 MG tablet Take 20 mg by mouth daily.  aspirin EC 81 MG EC tablet Take 1 tablet by mouth daily. 30 tablet 1    acetaminophen (TYLENOL) 325 MG tablet Take 650 mg by mouth every 6 hours as needed. No current facility-administered medications for this visit.       Past Medical History:   Diagnosis Date    Bradycardia     Hyperlipidemia     Hypertension     SINTIA treated with BiPAP 8/18/2015    Stroke, hemorrhagic ([facility] [address] 5/2015    Type II or unspecified type diabetes mellitus without mention of complication, not stated as uncontrolled      Past Surgical History:   Procedure Laterality Date    CARDIAC CATHETERIZATION  5/2015    CARDIAC DEFIBRILLATOR PLACEMENT      CARDIAC DEFIBRILLATOR PLACEMENT  8/26/15 upgrade ICD to Biv-ICD    CHOLECYSTECTOMY      OVARY REMOVAL       Family History   Problem Relation Age of Onset    Diabetes Mother     High Cholesterol Mother     High Blood Pressure Mother     Heart Disease Father      Social History     Social History    Marital status:      Spouse name: N/A    Number of children: N/A    Years of education: N/A     Occupational History    Not on file. Social History Main Topics    Smoking status: Never Smoker    Smokeless tobacco: Never Used    Alcohol use No    Drug use: No    Sexual activity: Yes     Partners: Male     Other Topics Concern    Not on file     Social History Narrative    No narrative on file     Review of Systems:   · Constitutional: there has been no unanticipated weight loss. There's been no change in energy level, sleep pattern, or activity level. · Eyes: No visual changes or diplopia. No scleral icterus. · ENT: No Headaches, hearing loss or vertigo. No mouth sores or sore throat. · Cardiovascular: Reviewed in HPI  · Respiratory: No cough or wheezing, no sputum production. No hematemesis. · Gastrointestinal: No abdominal pain, appetite loss, blood in stools. No change in bowel or bladder habits. · Genitourinary: No dysuria, trouble voiding, or hematuria. · Musculoskeletal:  No gait disturbance, weakness or joint complaints. · Integumentary: No rash or pruritis. · Neurological: No headache, diplopia, change in muscle strength, numbness or tingling. No change in gait, balance, coordination, mood, affect, memory, mentation, behavior. · Psychiatric: No anxiety, no depression. · Endocrine: No malaise, fatigue or temperature intolerance. No excessive thirst, fluid intake, or urination. No tremor. · Hematologic/Lymphatic: No abnormal bruising or bleeding, blood clots or swollen lymph nodes. · Allergic/Immunologic: No nasal congestion or hives.     Physical Examination:    Vitals:    09/13/18 1426   BP: 114/60 Site: Left Upper Arm   Position: Sitting   Cuff Size: Medium Adult   Pulse: 70   Resp: 16   SpO2: 91%   Weight: 230 lb 12.8 oz (104.7 kg)   Height: 5' 6\" (1.676 m)     Body mass index is 37.25 kg/m². Wt Readings from Last 3 Encounters:   09/13/18 230 lb 12.8 oz (104.7 kg)   08/20/18 232 lb (105.2 kg)   07/09/18 229 lb (103.9 kg)     BP Readings from Last 3 Encounters:   09/13/18 114/60   08/20/18 117/81   07/09/18 103/81     Constitutional and General Appearance:   WD/WN in NAD  HEENT:  NC/AT  Respiratory:  · Normal excursion and expansion without use of accessory muscles  · Resp Auscultation: Normal breath sounds without dullness. Cardiovascular:  · The apical impulses not displaced  · Heart tones are crisp and normal  · Cervical veins are not engorged  · The carotid upstroke is normal in amplitude and contour without delay or bruit  · JVP less than 8 cm H2O  RRR with nl S1 and S2 without m,r,g  · Peripheral pulses are symmetrical and full  · There is no clubbing, cyanosis of the extremities. · 1+ edema LE, L>R  · Femoral Arteries: 2+ and equal  · Pedal Pulses: 2+ and equal   Abdomen:  · No masses or tenderness  · Liver/Spleen: No Abnormalities Noted  Neurological/Psychiatric:  · Alert and oriented in all spheres  · Moves all extremities well  · Exhibits normal gait balance and coordination  · No abnormalities of mood, affect, memory, mentation, or behavior are noted    Diagnostics:    ECHO 7/21/17-- Technically difficult study due to lung interface and limited windows. Patient has history of Tetralogy of Fallot repair with 2 Jazmin-Taussig shunt and atrial septal defect repair. Normal left ventricle size. There is mild concentric left ventricular hypertrophy.   Left ventricular function difficult to estimate due to poor endocardial  definition but appears reduced, likely severely reduced and comparable to  previous two echos. Abnormal septal motion.  The left atrium is dilated.

## 2018-10-15 RX ORDER — FUROSEMIDE 20 MG/1
TABLET ORAL
Qty: 60 TABLET | Refills: 3 | Status: SHIPPED | OUTPATIENT
Start: 2018-10-15 | End: 2019-07-16 | Stop reason: SDUPTHER

## 2018-11-06 ENCOUNTER — TELEPHONE (OUTPATIENT)
Dept: CARDIOLOGY CLINIC | Age: 63
End: 2018-11-06

## 2018-11-25 PROCEDURE — 93297 REM INTERROG DEV EVAL ICPMS: CPT | Performed by: INTERNAL MEDICINE

## 2018-11-25 PROCEDURE — 93296 REM INTERROG EVL PM/IDS: CPT | Performed by: INTERNAL MEDICINE

## 2018-11-25 PROCEDURE — 93295 DEV INTERROG REMOTE 1/2/MLT: CPT | Performed by: INTERNAL MEDICINE

## 2018-11-27 ENCOUNTER — NURSE ONLY (OUTPATIENT)
Dept: CARDIOLOGY CLINIC | Age: 63
End: 2018-11-27
Payer: COMMERCIAL

## 2018-11-27 DIAGNOSIS — Z95.810 CARDIAC DEFIBRILLATOR IN PLACE: ICD-10-CM

## 2018-11-27 DIAGNOSIS — I42.9 PRIMARY CARDIOMYOPATHY (HCC): Chronic | ICD-10-CM

## 2018-11-27 DIAGNOSIS — I50.22 CHRONIC SYSTOLIC CHF (CONGESTIVE HEART FAILURE) (HCC): Chronic | ICD-10-CM

## 2018-11-27 NOTE — LETTER
[address] [place] [phone]  [address] [facility] [place] [address] [phone]    Pacemaker/Defibrillator Clinic          11/26/18        Rafael Morrow  [place] [id number]        Dear Rafael Morrow    This letter is to inform you that we received the transmission from your monitor at home that checks your pacemaker and/or defibrillator, or implanted heart monitor. Your device shows normal function. The next date your monitor will automatically transmit will be 3-5-19. Please do not send additional routine transmissions unless specifically requested. Your device and monitor are wireless and most transmit cellularly, but please periodically check your monitor is still plugged in to the electrical outlet. If you still use the telephone land line to send please ensure the connection to the phone willie is secure. This will help to ensure successful automatic transmissions in the future. Also, the monitor needs to be close to you while sleeping at night. Please be aware that the remote device transmission sites are periodically monitored only during regular business hours during which simultaneous in-office device clinics are being run. If your transmission requires attention, we will contact you as soon as possible. Thank you.             [facility]

## 2019-01-14 ENCOUNTER — OFFICE VISIT (OUTPATIENT)
Dept: PULMONOLOGY | Age: 64
End: 2019-01-14
Payer: COMMERCIAL

## 2019-01-14 VITALS
SYSTOLIC BLOOD PRESSURE: 109 MMHG | BODY MASS INDEX: 37.28 KG/M2 | WEIGHT: 232 LBS | HEART RATE: 89 BPM | DIASTOLIC BLOOD PRESSURE: 64 MMHG | HEIGHT: 66 IN | OXYGEN SATURATION: 94 %

## 2019-01-14 DIAGNOSIS — I50.22 CHRONIC SYSTOLIC CHF (CONGESTIVE HEART FAILURE) (HCC): Chronic | ICD-10-CM

## 2019-01-14 DIAGNOSIS — G47.33 OSA TREATED WITH BIPAP: Primary | Chronic | ICD-10-CM

## 2019-01-14 DIAGNOSIS — J44.9 COPD, SEVERE (HCC): Chronic | ICD-10-CM

## 2019-01-14 DIAGNOSIS — E66.9 NON MORBID OBESITY, UNSPECIFIED OBESITY TYPE: ICD-10-CM

## 2019-01-14 PROCEDURE — 99214 OFFICE O/P EST MOD 30 MIN: CPT | Performed by: INTERNAL MEDICINE

## 2019-01-14 ASSESSMENT — SLEEP AND FATIGUE QUESTIONNAIRES
HOW LIKELY ARE YOU TO NOD OFF OR FALL ASLEEP WHILE SITTING AND READING: 0
HOW LIKELY ARE YOU TO NOD OFF OR FALL ASLEEP WHILE SITTING QUIETLY AFTER LUNCH WITHOUT ALCOHOL: 0
HOW LIKELY ARE YOU TO NOD OFF OR FALL ASLEEP WHEN YOU ARE A PASSENGER IN A CAR FOR AN HOUR WITHOUT A BREAK: 1
HOW LIKELY ARE YOU TO NOD OFF OR FALL ASLEEP WHILE LYING DOWN TO REST IN THE AFTERNOON WHEN CIRCUMSTANCES PERMIT: 1
HOW LIKELY ARE YOU TO NOD OFF OR FALL ASLEEP WHILE SITTING INACTIVE IN A PUBLIC PLACE: 0
HOW LIKELY ARE YOU TO NOD OFF OR FALL ASLEEP IN A CAR, WHILE STOPPED FOR A FEW MINUTES IN TRAFFIC: 0
HOW LIKELY ARE YOU TO NOD OFF OR FALL ASLEEP WHILE WATCHING TV: 0
ESS TOTAL SCORE: 2
HOW LIKELY ARE YOU TO NOD OFF OR FALL ASLEEP WHILE SITTING AND TALKING TO SOMEONE: 0

## 2019-01-14 ASSESSMENT — ENCOUNTER SYMPTOMS
APNEA: 0
SHORTNESS OF BREATH: 1
COUGH: 0
RHINORRHEA: 0
CHOKING: 0

## 2019-01-17 ENCOUNTER — TELEPHONE (OUTPATIENT)
Dept: PULMONOLOGY | Age: 64
End: 2019-01-17

## 2019-01-21 ENCOUNTER — OFFICE VISIT (OUTPATIENT)
Dept: PULMONOLOGY | Age: 64
End: 2019-01-21
Payer: COMMERCIAL

## 2019-01-21 VITALS
WEIGHT: 229 LBS | BODY MASS INDEX: 36.96 KG/M2 | HEART RATE: 84 BPM | SYSTOLIC BLOOD PRESSURE: 131 MMHG | OXYGEN SATURATION: 95 % | DIASTOLIC BLOOD PRESSURE: 68 MMHG

## 2019-01-21 DIAGNOSIS — J98.4 RESTRICTIVE LUNG DISEASE: ICD-10-CM

## 2019-01-21 DIAGNOSIS — J98.6 ELEVATED DIAPHRAGM: ICD-10-CM

## 2019-01-21 DIAGNOSIS — J44.9 COPD, SEVERE (HCC): Primary | Chronic | ICD-10-CM

## 2019-01-21 DIAGNOSIS — I42.9 CARDIOMYOPATHY, UNSPECIFIED TYPE (HCC): Chronic | ICD-10-CM

## 2019-01-21 DIAGNOSIS — G47.33 OSA TREATED WITH BIPAP: Chronic | ICD-10-CM

## 2019-01-21 PROCEDURE — 99214 OFFICE O/P EST MOD 30 MIN: CPT | Performed by: INTERNAL MEDICINE

## 2019-01-21 RX ORDER — DOXYCYCLINE HYCLATE 100 MG/1
100 CAPSULE ORAL DAILY
Qty: 10 CAPSULE | Refills: 3 | Status: SHIPPED | OUTPATIENT
Start: 2019-01-21 | End: 2019-01-31

## 2019-03-05 ENCOUNTER — NURSE ONLY (OUTPATIENT)
Dept: CARDIOLOGY CLINIC | Age: 64
End: 2019-03-05
Payer: COMMERCIAL

## 2019-03-05 DIAGNOSIS — I50.22 CHRONIC SYSTOLIC CHF (CONGESTIVE HEART FAILURE) (HCC): Chronic | ICD-10-CM

## 2019-03-05 DIAGNOSIS — I42.9 CARDIOMYOPATHY, UNSPECIFIED TYPE (HCC): Chronic | ICD-10-CM

## 2019-03-05 DIAGNOSIS — Z95.810 CARDIAC DEFIBRILLATOR IN PLACE: ICD-10-CM

## 2019-03-05 PROCEDURE — 93296 REM INTERROG EVL PM/IDS: CPT | Performed by: INTERNAL MEDICINE

## 2019-03-05 PROCEDURE — 93295 DEV INTERROG REMOTE 1/2/MLT: CPT | Performed by: INTERNAL MEDICINE

## 2019-03-05 PROCEDURE — 93297 REM INTERROG DEV EVAL ICPMS: CPT | Performed by: INTERNAL MEDICINE

## 2019-04-12 ENCOUNTER — HOSPITAL ENCOUNTER (OUTPATIENT)
Age: 64
Discharge: HOME | End: 2019-04-12
Payer: COMMERCIAL

## 2019-04-12 ENCOUNTER — OFFICE VISIT (OUTPATIENT)
Dept: CARDIOLOGY CLINIC | Age: 64
End: 2019-04-12
Payer: COMMERCIAL

## 2019-04-12 VITALS
DIASTOLIC BLOOD PRESSURE: 66 MMHG | RESPIRATION RATE: 16 BRPM | OXYGEN SATURATION: 93 % | HEART RATE: 77 BPM | WEIGHT: 226.8 LBS | HEIGHT: 66 IN | SYSTOLIC BLOOD PRESSURE: 124 MMHG | BODY MASS INDEX: 36.45 KG/M2

## 2019-04-12 DIAGNOSIS — E55.9 VITAMIN D INSUFFICIENCY: ICD-10-CM

## 2019-04-12 DIAGNOSIS — Z95.810 IMPLANTABLE CARDIOVERTER-DEFIBRILLATOR (ICD) IN SITU: ICD-10-CM

## 2019-04-12 DIAGNOSIS — Z87.74 TETRALOGY OF FALLOT S/P REPAIR: Chronic | ICD-10-CM

## 2019-04-12 DIAGNOSIS — I50.22 CHRONIC SYSTOLIC CHF (CONGESTIVE HEART FAILURE) (HCC): Primary | Chronic | ICD-10-CM

## 2019-04-12 DIAGNOSIS — I50.22 CHRONIC SYSTOLIC CHF (CONGESTIVE HEART FAILURE) (HCC): Chronic | ICD-10-CM

## 2019-04-12 DIAGNOSIS — I50.9 CHRONIC HEART FAILURE, UNSPECIFIED HEART FAILURE TYPE (HCC): ICD-10-CM

## 2019-04-12 DIAGNOSIS — E78.2 MIXED HYPERLIPIDEMIA: ICD-10-CM

## 2019-04-12 DIAGNOSIS — G47.33 OSA TREATED WITH BIPAP: Chronic | ICD-10-CM

## 2019-04-12 DIAGNOSIS — Z95.810 AUTOMATIC IMPLANTABLE CARDIOVERTER-DEFIBRILLATOR IN SITU: Chronic | ICD-10-CM

## 2019-04-12 LAB
A/G RATIO: 1.3 (ref 1.1–2.2)
ALBUMIN SERPL-MCNC: 4.2 G/DL (ref 3.4–5)
ALP BLD-CCNC: 108 U/L (ref 40–129)
ALT SERPL-CCNC: 17 U/L (ref 10–40)
ANION GAP SERPL CALCULATED.3IONS-SCNC: 15 MMOL/L (ref 3–16)
AST SERPL-CCNC: 16 U/L (ref 15–37)
BILIRUB SERPL-MCNC: 0.6 MG/DL (ref 0–1)
BUN BLDV-MCNC: 12 MG/DL (ref 7–20)
CALCIUM SERPL-MCNC: 10 MG/DL (ref 8.3–10.6)
CHLORIDE BLD-SCNC: 99 MMOL/L (ref 99–110)
CO2: 27 MMOL/L (ref 21–32)
CREAT SERPL-MCNC: 1 MG/DL (ref 0.6–1.2)
GFR AFRICAN AMERICAN: >60
GFR NON-AFRICAN AMERICAN: 56
GLOBULIN: 3.2 G/DL
GLUCOSE BLD-MCNC: 129 MG/DL (ref 70–99)
POTASSIUM SERPL-SCNC: 4.4 MMOL/L (ref 3.5–5.1)
PRO-BNP: 622 PG/ML (ref 0–124)
SODIUM BLD-SCNC: 141 MMOL/L (ref 136–145)
TOTAL PROTEIN: 7.4 G/DL (ref 6.4–8.2)

## 2019-04-12 PROCEDURE — 93290 INTERROG DEV EVAL ICPMS IP: CPT | Performed by: INTERNAL MEDICINE

## 2019-04-12 PROCEDURE — 99214 OFFICE O/P EST MOD 30 MIN: CPT | Performed by: INTERNAL MEDICINE

## 2019-04-12 PROCEDURE — 80053 COMPREHEN METABOLIC PANEL: CPT

## 2019-04-12 PROCEDURE — 83880 ASSAY OF NATRIURETIC PEPTIDE: CPT

## 2019-04-12 PROCEDURE — 36415 COLL VENOUS BLD VENIPUNCTURE: CPT

## 2019-04-12 RX ORDER — CARVEDILOL 12.5 MG/1
TABLET ORAL
Qty: 60 TABLET | Refills: 5 | Status: SHIPPED | OUTPATIENT
Start: 2019-04-12 | End: 2019-10-26 | Stop reason: SDUPTHER

## 2019-04-12 NOTE — PROGRESS NOTES
unspecified type diabetes mellitus without mention of complication, not stated as uncontrolled      Past Surgical History:   Procedure Laterality Date    CARDIAC CATHETERIZATION  5/2015    CARDIAC DEFIBRILLATOR PLACEMENT      CARDIAC DEFIBRILLATOR PLACEMENT  8/26/15    upgrade ICD to Biv-ICD    CHOLECYSTECTOMY      OVARY REMOVAL       Family History   Problem Relation Age of Onset    Diabetes Mother     High Cholesterol Mother     High Blood Pressure Mother     Heart Disease Father      Social History     Socioeconomic History    Marital status:      Spouse name: Not on file    Number of children: Not on file    Years of education: Not on file    Highest education level: Not on file   Occupational History    Not on file   Social Needs    Financial resource strain: Not on file    Food insecurity:     Worry: Not on file     Inability: Not on file    Transportation needs:     Medical: Not on file     Non-medical: Not on file   Tobacco Use    Smoking status: Never Smoker    Smokeless tobacco: Never Used   Substance and Sexual Activity    Alcohol use: No    Drug use: No    Sexual activity: Yes     Partners: Male   Lifestyle    Physical activity:     Days per week: Not on file     Minutes per session: Not on file    Stress: Not on file   Relationships    Social connections:     Talks on phone: Not on file     Gets together: Not on file     Attends Religion service: Not on file     Active member of club or organization: Not on file     Attends meetings of clubs or organizations: Not on file     Relationship status: Not on file    Intimate partner violence:     Fear of current or ex partner: Not on file     Emotionally abused: Not on file     Physically abused: Not on file     Forced sexual activity: Not on file   Other Topics Concern    Not on file   Social History Narrative    Not on file     Review of Systems:   · Constitutional: there has been no unanticipated weight loss.  There's been no change in energy level, sleep pattern, or activity level. · Eyes: No visual changes or diplopia. No scleral icterus. · ENT: No Headaches, hearing loss or vertigo. No mouth sores or sore throat. · Cardiovascular: Reviewed in HPI  · Respiratory: No cough or wheezing, no sputum production. No hematemesis. · Gastrointestinal: No abdominal pain, appetite loss, blood in stools. No change in bowel or bladder habits. · Genitourinary: No dysuria, trouble voiding, or hematuria. · Musculoskeletal:  No gait disturbance, weakness or joint complaints. · Integumentary: No rash or pruritis. · Neurological: No headache, diplopia, change in muscle strength, numbness or tingling. No change in gait, balance, coordination, mood, affect, memory, mentation, behavior. · Psychiatric: No anxiety, no depression. · Endocrine: No malaise, fatigue or temperature intolerance. No excessive thirst, fluid intake, or urination. No tremor. · Hematologic/Lymphatic: No abnormal bruising or bleeding, blood clots or swollen lymph nodes. · Allergic/Immunologic: No nasal congestion or hives. Physical Examination:    /66 (Site: Left Upper Arm, Position: Sitting, Cuff Size: Large Adult)   Pulse 77   Resp 16   Ht 5' 6\" (1.676 m)   Wt 226 lb 12.8 oz (102.9 kg)   SpO2 93%   BMI 36.61 kg/m²     Vitals:    04/12/19 0845   BP: 124/66   Site: Left Upper Arm   Position: Sitting   Cuff Size: Large Adult   Pulse: 77   Resp: 16   SpO2: 93%   Weight: 226 lb 12.8 oz (102.9 kg)   Height: 5' 6\" (1.676 m)     Body mass index is 36.61 kg/m².      Wt Readings from Last 3 Encounters:   04/12/19 226 lb 12.8 oz (102.9 kg)   01/21/19 229 lb (103.9 kg)   01/14/19 232 lb (105.2 kg)     BP Readings from Last 3 Encounters:   04/12/19 124/66   01/21/19 131/68   01/14/19 109/64     Constitutional and General Appearance:   WD/WN in NAD  HEENT:  NC/AT  Respiratory:  · Normal excursion and expansion without use of accessory muscles  · Resp Auscultation: Normal breath sounds without dullness. Cardiovascular:  · The apical impulses not displaced  · Heart tones are crisp and normal  · Cervical veins are not engorged  · The carotid upstroke is normal in amplitude and contour without delay or bruit  · JVP normal  RRR with nl S1 and S2 without m,r,g  · Peripheral pulses are symmetrical and full  · There is no clubbing, cyanosis of the extremities. · 1+ edema LE, L>R  · Femoral Arteries: 2+ and equal  · Pedal Pulses: 2+ and equal   Abdomen:  · No masses or tenderness  · Liver/Spleen: No Abnormalities Noted  Neurological/Psychiatric:  · Alert and oriented in all spheres  · Moves all extremities well  · Exhibits normal gait balance and coordination  · No abnormalities of mood, affect, memory, mentation, or behavior are noted    Diagnostics:    ECHO 7/21/17-- Technically difficult study due to lung interface and limited windows. Patient has history of Tetralogy of Fallot repair with 2 Jazmin-Taussig shunt and atrial septal defect repair. Normal left ventricle size. There is mild concentric left ventricular hypertrophy.   Left ventricular function difficult to estimate due to poor endocardial  definition but appears reduced, likely severely reduced and comparable to  previous two echos. Abnormal septal motion. The left atrium is dilated.   There is trivial tricuspid regurgitation with RVSP estimated at 19 mmHg. [facility]  TOF s/p repair with non trans-annular patch infundibular resection (May [birth year], 1965 [facility])  Limited echocardiographic windows. Unable to quantify ventricular function.   No residual ventricular septal defect  Mild tricuspid valve regurgitation  Right ventricle normal in size and systolic function moderately diminished  Paradoxical interventricular septum motion  No right ventricle outflow obstruction  Mild pulmonary valve regurgitation  Trivial mitral valve regurgitation  Left ventricle is mildly dilated and systolic function moderately diminished  No sign of pericardial effusion  Compared to previous echo right ventricle pressure slightly decreased    Echo(3/15) shows normal LV function. Labs were reviewed including labs from other hospital systems through [facility]. Cardiac testing was reviewed including echos, nuclear scans, cardiac catheterization, including from other hospital systems through [facility]. Assessment:    1. Chronic systolic CHF (congestive heart failure) ([address]    2. Implantable cardioverter-defibrillator (ICD) in situ    3. Chronic heart failure, unspecified heart failure type ([address]    4. Automatic implantable cardioverter-defibrillator in situ    5. Mixed hyperlipidemia    6. Tetralogy of Fallot s/p repair    7. SINTIA treated with BiPAP    8. Vitamin D insufficiency        1. Chronic systolic CHF (congestive heart failure) (HCC) -  Compensated by exam.  Abnormal septal motion. EF 40-45   Mild RV enlargement with global hypokinesis. Echo @ [facility] 4/4/2016 compared with echo on 4/21/2015> the right ventricle pressures are slightly diminished. 2. Implantable cardioverter-defibrillator (ICD) in situ-  upgraded 8/2015. Regular device checks. F/w Dr. Morris Paige     3. Chronic heart failure, unspecified heart failure type ([address] - Stable     4. Automatic implantable cardioverter-defibrillator in situ      5. Mixed hyperlipidemia-  Taking Lipitor. 6. Tetralogy of Fallot s/p repair -Surgery at age 8 1/2  years   Following with [facility] Josselin Moss at [facility] for congential heart disease with us     7. SINTIA treated with BiPAP - Compliant. 8. Vitamin D insufficiency        Plan:  1. Labs today CMP, BNP  2. See Dr. Pinky Noel in 6 months with an echo      Scribe's attestation: This note was scribed in the presence of Sweetie Arroyo M.D. By Craig Tavares RN     The scribe's documentation has been prepared under my direction and personally reviewed by me in its entirety.   I confirm that the note above accurately reflects all work, treatment, procedures, and medical decision making performed by me. QUALITY MEASURES  1. Tobacco Cessation Counseling: NA  2. Retake of BP if >140/90: NA  3. CAD patient on anti-platelet: NA but on ASA  4. CAD patient on STATIN therapy: NA but on lipitor  5. Patient with CHF and aFib on anticoagulation:  NA      I appreciate the opportunity of cooperating in the care of this patient.     Sweetie Arroyo M.D., [facility]

## 2019-04-30 RX ORDER — CARVEDILOL 12.5 MG/1
TABLET ORAL
Qty: 60 TABLET | Refills: 4 | OUTPATIENT
Start: 2019-04-30

## 2019-06-18 ENCOUNTER — NURSE ONLY (OUTPATIENT)
Dept: CARDIOLOGY CLINIC | Age: 64
End: 2019-06-18
Payer: COMMERCIAL

## 2019-06-18 DIAGNOSIS — I50.22 CHRONIC SYSTOLIC CHF (CONGESTIVE HEART FAILURE) (HCC): Chronic | ICD-10-CM

## 2019-06-18 DIAGNOSIS — Z95.810 CARDIAC DEFIBRILLATOR IN PLACE: ICD-10-CM

## 2019-06-18 DIAGNOSIS — I42.9 CARDIOMYOPATHY, UNSPECIFIED TYPE (HCC): Chronic | ICD-10-CM

## 2019-06-18 PROCEDURE — 93295 DEV INTERROG REMOTE 1/2/MLT: CPT | Performed by: INTERNAL MEDICINE

## 2019-06-18 PROCEDURE — 93296 REM INTERROG EVL PM/IDS: CPT | Performed by: INTERNAL MEDICINE

## 2019-06-18 PROCEDURE — 93297 REM INTERROG DEV EVAL ICPMS: CPT | Performed by: INTERNAL MEDICINE

## 2019-07-16 RX ORDER — FUROSEMIDE 20 MG/1
TABLET ORAL
Qty: 60 TABLET | Refills: 2 | Status: SHIPPED | OUTPATIENT
Start: 2019-07-16 | End: 2020-01-22

## 2019-08-05 RX ORDER — FLUTICASONE FUROATE AND VILANTEROL 200; 25 UG/1; UG/1
POWDER RESPIRATORY (INHALATION)
Qty: 1 EACH | Refills: 10 | Status: SHIPPED | OUTPATIENT
Start: 2019-08-05 | End: 2020-08-28

## 2019-08-16 NOTE — PROGRESS NOTES
challenging and required snaring of the LV lead. Interval history:   Tori Waller feels that she is doing ok but she does have an occasional episode of shortness of breath but she believes it is due to the heat of the summer. She feels that she has maybe lost a little bit of weight. She does have sleep apnea and uses her machine consistently. Her device has about a year remaining battery.      Interrogation of device shows normal function,  11 months DAVID, BiV paced 98.8%     Past Medical History:   Diagnosis Date    Bradycardia     Hyperlipidemia     Hypertension     SINTIA treated with BiPAP 8/18/2015    Stroke, hemorrhagic ([address] 5/2015    Type II or unspecified type diabetes mellitus without mention of complication, not stated as uncontrolled         Past Surgical History:   Procedure Laterality Date    CARDIAC CATHETERIZATION  5/2015    CARDIAC DEFIBRILLATOR PLACEMENT      CARDIAC DEFIBRILLATOR PLACEMENT  8/26/15    upgrade ICD to Biv-ICD    CHOLECYSTECTOMY      OVARY REMOVAL         Allergies   Allergen Reactions    Lisinopril      Cough       Medication:   Current Outpatient Medications   Medication Sig Dispense Refill    Multiple Vitamins-Minerals (MULTIVITAMIN ADULT PO) Take by mouth      Fluticasone Furoate-Vilanterol (BREO ELLIPTA) 200-25 MCG/INH AEPB INHALE ONE DOSE BY MOUTH DAILY 1 each 10    furosemide (LASIX) 20 MG tablet TAKE ONE TABLET BY MOUTH DAILY AND AN EXTRA DOSE AS NEEDED FOR SWELLING AND WEIGHT GAIN 60 tablet 2    carvedilol (COREG) 12.5 MG tablet TAKE ONE TABLET BY MOUTH TWICE A DAY 60 tablet 5    losartan (COZAAR) 100 MG tablet Take 100 mg by mouth daily      albuterol sulfate HFA (PROVENTIL HFA) 108 (90 Base) MCG/ACT inhaler Inhale 2 puffs into the lungs every 4 hours as needed for Wheezing or Shortness of Breath 1 Inhaler 1    fluticasone (FLONASE) 50 MCG/ACT nasal spray 2 sprays by Nasal route daily (Patient taking differently: 2 sprays by Nasal route daily as needed ) 1 Bottle 5    BiPAP Machine MISC by Does not apply route      metFORMIN (GLUCOPHAGE) 500 MG tablet Take 500 mg by mouth daily (with breakfast).  atorvastatin (LIPITOR) 20 MG tablet Take 20 mg by mouth daily.  aspirin EC 81 MG EC tablet Take 1 tablet by mouth daily. 30 tablet 1    acetaminophen (TYLENOL) 325 MG tablet Take 650 mg by mouth every 6 hours as needed.  Cholecalciferol (VITAMIN D) 2000 units TABS tablet Take 1 tablet by mouth daily (Patient not taking: Reported on 8/20/2019) 90 tablet 3     No current facility-administered medications for this visit. Social History:   reports that she has never smoked. She has never used smokeless tobacco. She reports that she does not drink alcohol or use drugs. Family History:  family history includes Diabetes in her mother; Heart Disease in her father; High Blood Pressure in her mother; High Cholesterol in her mother. Review of System:    · General: negative for fever, chills   · Ophthalmic ROS: negative for - eye pain or loss of vision  · ENT ROS: negative for - headaches, sore throat    · Respiratory: negative for - cough, sputum  · Cardiovascular: Reviewed in HPI  · Gastrointestinal: negative for - abdominal pain, diarrhea, N/V  · Hematology: negative for - bleeding, blood clots, bruising or jaundice  · Genito-Urinary:  negative for - Dysuria or incontinence  · Musculoskeletal: negative for - Joint swelling, muscle pain  · Neurological: negative for - confusion, dizziness, headaches   · Psychiatric: No anxiety, no depression. · Dermatological: negative for - rash    Physical Examination:  Vitals:    08/20/19 0752   BP: 99/69   Pulse: 78   Resp: 16       · Constitutional: Oriented. No distress. · Head: Normocephalic and atraumatic. · Mouth/Throat: Oropharynx is clear and moist.   · Eyes: Conjunctivae normal. EOM are normal.   · Neck: Neck supple. No JVD present.     · Cardiovascular: Normal rate, regular rhythm, M3&P2.  + systolic obesity, unspecified obesity type     Assessment and plan:       Non-Ischemic cardiomyopathy s/p AICD   - I reviewed device interrogation today. Device functions normally. - Vpaced 99% AP: 78%  - Follow up with device clinic as scheduled. - Non-Ischemic cardiomyopathy s/p AICD    - Coreg 12.5 bid               -Losartan 100 mg daily              - [facility]                 - HTN:  Controlled. Continue current medications. - Obesity: Body mass index is 36.48 kg/m². - Discussed weight loss again    F/u 1 year    All questions and concerns were addressed to the patient/family. Alternatives to my treatment were discussed. Tara Blanc MD, MPH  [facility]   Office: [phone]     Scribe attestation: This note was scribed in the presence of Tara Blanc MD by Ricci Pryor, RN  Physician Attestation: I, Dr. aTra Blanc, confirm that the scribe's documentation has been prepared under my direction and personally reviewed by me in its entirety. I also confirm that the note above accurately reflects all work, treatment, procedures, and medical decision making performed by me.

## 2019-08-20 ENCOUNTER — OFFICE VISIT (OUTPATIENT)
Dept: CARDIOLOGY CLINIC | Age: 64
End: 2019-08-20
Payer: COMMERCIAL

## 2019-08-20 ENCOUNTER — PROCEDURE VISIT (OUTPATIENT)
Dept: CARDIOLOGY CLINIC | Age: 64
End: 2019-08-20
Payer: COMMERCIAL

## 2019-08-20 VITALS
SYSTOLIC BLOOD PRESSURE: 99 MMHG | DIASTOLIC BLOOD PRESSURE: 69 MMHG | BODY MASS INDEX: 36.32 KG/M2 | RESPIRATION RATE: 16 BRPM | HEART RATE: 78 BPM | HEIGHT: 66 IN | WEIGHT: 226 LBS

## 2019-08-20 DIAGNOSIS — I50.22 CHRONIC SYSTOLIC CHF (CONGESTIVE HEART FAILURE) (HCC): Chronic | ICD-10-CM

## 2019-08-20 DIAGNOSIS — G47.33 OSA TREATED WITH BIPAP: Chronic | ICD-10-CM

## 2019-08-20 DIAGNOSIS — Z87.74 TETRALOGY OF FALLOT S/P REPAIR: Chronic | ICD-10-CM

## 2019-08-20 DIAGNOSIS — I42.9 CARDIOMYOPATHY, UNSPECIFIED TYPE (HCC): Primary | ICD-10-CM

## 2019-08-20 DIAGNOSIS — I42.9 CARDIOMYOPATHY, UNSPECIFIED TYPE (HCC): Chronic | ICD-10-CM

## 2019-08-20 DIAGNOSIS — Z95.810 IMPLANTABLE CARDIOVERTER-DEFIBRILLATOR (ICD) IN SITU: ICD-10-CM

## 2019-08-20 DIAGNOSIS — I42.9 PRIMARY CARDIOMYOPATHY (HCC): Chronic | ICD-10-CM

## 2019-08-20 DIAGNOSIS — I50.9 CHRONIC HEART FAILURE, UNSPECIFIED HEART FAILURE TYPE (HCC): ICD-10-CM

## 2019-08-20 DIAGNOSIS — Z95.810 AUTOMATIC IMPLANTABLE CARDIOVERTER-DEFIBRILLATOR IN SITU: ICD-10-CM

## 2019-08-20 PROCEDURE — 93290 INTERROG DEV EVAL ICPMS IP: CPT | Performed by: INTERNAL MEDICINE

## 2019-08-20 PROCEDURE — 93284 PRGRMG EVAL IMPLANTABLE DFB: CPT | Performed by: INTERNAL MEDICINE

## 2019-08-20 PROCEDURE — 99214 OFFICE O/P EST MOD 30 MIN: CPT | Performed by: INTERNAL MEDICINE

## 2019-10-07 ENCOUNTER — TELEPHONE (OUTPATIENT)
Dept: CARDIOLOGY CLINIC | Age: 64
End: 2019-10-07

## 2019-10-14 ENCOUNTER — NURSE ONLY (OUTPATIENT)
Dept: CARDIOLOGY CLINIC | Age: 64
End: 2019-10-14
Payer: COMMERCIAL

## 2019-10-14 ENCOUNTER — OFFICE VISIT (OUTPATIENT)
Dept: CARDIOLOGY CLINIC | Age: 64
End: 2019-10-14
Payer: COMMERCIAL

## 2019-10-14 VITALS
HEIGHT: 66 IN | HEART RATE: 73 BPM | DIASTOLIC BLOOD PRESSURE: 68 MMHG | OXYGEN SATURATION: 96 % | BODY MASS INDEX: 37.28 KG/M2 | WEIGHT: 232 LBS | SYSTOLIC BLOOD PRESSURE: 90 MMHG

## 2019-10-14 DIAGNOSIS — R06.02 SOB (SHORTNESS OF BREATH): ICD-10-CM

## 2019-10-14 DIAGNOSIS — E78.2 MIXED HYPERLIPIDEMIA: ICD-10-CM

## 2019-10-14 DIAGNOSIS — I50.9 CHRONIC HEART FAILURE, UNSPECIFIED HEART FAILURE TYPE (HCC): ICD-10-CM

## 2019-10-14 DIAGNOSIS — I42.9 CARDIOMYOPATHY, UNSPECIFIED TYPE (HCC): Chronic | ICD-10-CM

## 2019-10-14 DIAGNOSIS — Z95.810 IMPLANTABLE CARDIOVERTER-DEFIBRILLATOR (ICD) IN SITU: ICD-10-CM

## 2019-10-14 DIAGNOSIS — I50.22 CHRONIC SYSTOLIC CHF (CONGESTIVE HEART FAILURE) (HCC): Primary | ICD-10-CM

## 2019-10-14 DIAGNOSIS — Z95.810 AUTOMATIC IMPLANTABLE CARDIOVERTER-DEFIBRILLATOR IN SITU: ICD-10-CM

## 2019-10-14 DIAGNOSIS — G47.33 OSA TREATED WITH BIPAP: ICD-10-CM

## 2019-10-14 DIAGNOSIS — Z87.74 TETRALOGY OF FALLOT S/P REPAIR: ICD-10-CM

## 2019-10-14 PROCEDURE — 93290 INTERROG DEV EVAL ICPMS IP: CPT | Performed by: INTERNAL MEDICINE

## 2019-10-14 PROCEDURE — 93284 PRGRMG EVAL IMPLANTABLE DFB: CPT | Performed by: INTERNAL MEDICINE

## 2019-10-14 PROCEDURE — 99214 OFFICE O/P EST MOD 30 MIN: CPT | Performed by: INTERNAL MEDICINE

## 2019-10-19 ENCOUNTER — HOSPITAL ENCOUNTER (OUTPATIENT)
Age: 64
Discharge: HOME | End: 2019-10-19
Payer: COMMERCIAL

## 2019-10-19 DIAGNOSIS — R06.02 SOB (SHORTNESS OF BREATH): ICD-10-CM

## 2019-10-19 DIAGNOSIS — E78.2 MIXED HYPERLIPIDEMIA: ICD-10-CM

## 2019-10-19 DIAGNOSIS — I50.22 CHRONIC SYSTOLIC CHF (CONGESTIVE HEART FAILURE) (HCC): ICD-10-CM

## 2019-10-19 DIAGNOSIS — I42.9 CARDIOMYOPATHY, UNSPECIFIED TYPE (HCC): Chronic | ICD-10-CM

## 2019-10-19 LAB
A/G RATIO: 1.5 (ref 1.1–2.2)
ALBUMIN SERPL-MCNC: 4.3 G/DL (ref 3.4–5)
ALP BLD-CCNC: 109 U/L (ref 40–129)
ALT SERPL-CCNC: 17 U/L (ref 10–40)
ANION GAP SERPL CALCULATED.3IONS-SCNC: 15 MMOL/L (ref 3–16)
AST SERPL-CCNC: 18 U/L (ref 15–37)
BILIRUB SERPL-MCNC: 0.9 MG/DL (ref 0–1)
BUN BLDV-MCNC: 12 MG/DL (ref 7–20)
CALCIUM SERPL-MCNC: 9.4 MG/DL (ref 8.3–10.6)
CHLORIDE BLD-SCNC: 96 MMOL/L (ref 99–110)
CHOLESTEROL, TOTAL: 134 MG/DL (ref 0–199)
CO2: 26 MMOL/L (ref 21–32)
CREAT SERPL-MCNC: 1 MG/DL (ref 0.6–1.2)
GFR AFRICAN AMERICAN: >60
GFR NON-AFRICAN AMERICAN: 55
GLOBULIN: 2.8 G/DL
GLUCOSE BLD-MCNC: 143 MG/DL (ref 70–99)
HCT VFR BLD CALC: 43.7 % (ref 36–48)
HDLC SERPL-MCNC: 57 MG/DL (ref 40–60)
HEMOGLOBIN: 14.4 G/DL (ref 12–16)
LDL CHOLESTEROL CALCULATED: 51 MG/DL
MCH RBC QN AUTO: 29.2 PG (ref 26–34)
MCHC RBC AUTO-ENTMCNC: 32.9 G/DL (ref 31–36)
MCV RBC AUTO: 89 FL (ref 80–100)
PDW BLD-RTO: 14.7 % (ref 12.4–15.4)
PLATELET # BLD: 222 K/UL (ref 135–450)
PMV BLD AUTO: 8.8 FL (ref 5–10.5)
POTASSIUM SERPL-SCNC: 4.4 MMOL/L (ref 3.5–5.1)
PRO-BNP: 542 PG/ML (ref 0–124)
RBC # BLD: 4.92 M/UL (ref 4–5.2)
SODIUM BLD-SCNC: 137 MMOL/L (ref 136–145)
TOTAL PROTEIN: 7.1 G/DL (ref 6.4–8.2)
TRIGL SERPL-MCNC: 132 MG/DL (ref 0–150)
VLDLC SERPL CALC-MCNC: 26 MG/DL
WBC # BLD: 9.6 K/UL (ref 4–11)

## 2019-10-19 PROCEDURE — 80061 LIPID PANEL: CPT

## 2019-10-19 PROCEDURE — 85027 COMPLETE CBC AUTOMATED: CPT

## 2019-10-19 PROCEDURE — 83880 ASSAY OF NATRIURETIC PEPTIDE: CPT

## 2019-10-19 PROCEDURE — 36415 COLL VENOUS BLD VENIPUNCTURE: CPT

## 2019-10-19 PROCEDURE — 80053 COMPREHEN METABOLIC PANEL: CPT

## 2019-10-29 RX ORDER — CARVEDILOL 12.5 MG/1
TABLET ORAL
Qty: 60 TABLET | Refills: 11 | Status: SHIPPED | OUTPATIENT
Start: 2019-10-29 | End: 2020-10-15

## 2019-12-03 ENCOUNTER — NURSE ONLY (OUTPATIENT)
Dept: CARDIOLOGY CLINIC | Age: 64
End: 2019-12-03
Payer: COMMERCIAL

## 2019-12-03 DIAGNOSIS — I42.9 CARDIOMYOPATHY, UNSPECIFIED TYPE (HCC): Chronic | ICD-10-CM

## 2019-12-03 DIAGNOSIS — I50.22 CHRONIC SYSTOLIC CHF (CONGESTIVE HEART FAILURE) (HCC): Chronic | ICD-10-CM

## 2019-12-03 DIAGNOSIS — Z95.810 CARDIAC DEFIBRILLATOR IN PLACE: ICD-10-CM

## 2019-12-03 PROCEDURE — 93297 REM INTERROG DEV EVAL ICPMS: CPT | Performed by: INTERNAL MEDICINE

## 2019-12-03 PROCEDURE — 93295 DEV INTERROG REMOTE 1/2/MLT: CPT | Performed by: INTERNAL MEDICINE

## 2019-12-03 PROCEDURE — 93296 REM INTERROG EVL PM/IDS: CPT | Performed by: INTERNAL MEDICINE

## 2020-01-13 ENCOUNTER — OFFICE VISIT (OUTPATIENT)
Dept: PULMONOLOGY | Age: 65
End: 2020-01-13
Payer: COMMERCIAL

## 2020-01-13 VITALS
WEIGHT: 227 LBS | BODY MASS INDEX: 36.48 KG/M2 | HEIGHT: 66 IN | SYSTOLIC BLOOD PRESSURE: 138 MMHG | DIASTOLIC BLOOD PRESSURE: 74 MMHG

## 2020-01-13 PROCEDURE — 99214 OFFICE O/P EST MOD 30 MIN: CPT | Performed by: NURSE PRACTITIONER

## 2020-01-13 ASSESSMENT — SLEEP AND FATIGUE QUESTIONNAIRES
HOW LIKELY ARE YOU TO NOD OFF OR FALL ASLEEP WHILE SITTING QUIETLY AFTER LUNCH WITHOUT ALCOHOL: 0
HOW LIKELY ARE YOU TO NOD OFF OR FALL ASLEEP IN A CAR, WHILE STOPPED FOR A FEW MINUTES IN TRAFFIC: 0
HOW LIKELY ARE YOU TO NOD OFF OR FALL ASLEEP WHILE LYING DOWN TO REST IN THE AFTERNOON WHEN CIRCUMSTANCES PERMIT: 1
HOW LIKELY ARE YOU TO NOD OFF OR FALL ASLEEP WHILE WATCHING TV: 1
HOW LIKELY ARE YOU TO NOD OFF OR FALL ASLEEP WHEN YOU ARE A PASSENGER IN A CAR FOR AN HOUR WITHOUT A BREAK: 1
HOW LIKELY ARE YOU TO NOD OFF OR FALL ASLEEP WHILE SITTING AND READING: 1
HOW LIKELY ARE YOU TO NOD OFF OR FALL ASLEEP WHILE SITTING INACTIVE IN A PUBLIC PLACE: 0
HOW LIKELY ARE YOU TO NOD OFF OR FALL ASLEEP WHILE SITTING AND TALKING TO SOMEONE: 0
ESS TOTAL SCORE: 4

## 2020-01-13 ASSESSMENT — ENCOUNTER SYMPTOMS
COUGH: 0
SHORTNESS OF BREATH: 0
SINUS PRESSURE: 0
ABDOMINAL PAIN: 0
EYE REDNESS: 0
APNEA: 0
RHINORRHEA: 0
EYE PAIN: 0
ABDOMINAL DISTENTION: 0

## 2020-01-13 NOTE — PROGRESS NOTES
Jean Cope MD, FAASM, [facility]  Rajiv Bill, MSN, RN, CNP     [facility] SLEEP MEDICINE  [address] [address]  Dept: [phone]  Dept Fax: : [phone] SLEEP MEDICINE  [address] [place] [id number]  [phone]    Subjective:     Patient ID: Teresa Weiss is a 75 y.o. female. Chief Complaint   Patient presents with    Other     cfu       HPI:      Machine Present today:  Yes    Machine Modem/Download Info:  Compliance (hours/night): 4.75 hrs/night  Download AHI (/hour): 1.6 /HR     Average IPAP Pressure: 11.2 cmH2O  Average EPAP Pressure: 6.6 cmH2O         AUTO BIPAP - Settings (Lina)  IPAP Max: 20 cmH2O  EPAP Min: 6 cmH2O  Pressure Support Min: 3  Pressure Support Max: 6             Comfort Settings  Humidity Level (0-8): 4  Flex/EPR (0-3): 2 PAP Mask  Mask Type: Nasal pillows     [place] - Total score: 4    Follow-up :     Last Visit : January 2019      Patient reports the listed chronic Co-morbidities: Cardiomyopathy, CAD, obesity, COPD   are well controlled and stable at this time. Subjective Health Changes: None     Over Night Oximetry: [] Yes  [] No  [x] NA [] WNL   Using O2: [] Yes  [] No  [x] NA   Patient is compliant with the machine  [x] Yes  [] No   Feeling rested when using the machine   [x] Yes  [] No     Pressure is comfortable with inspiration and expiration  [x] Yes  [] No     Noticed changes in pressure   [] Yes  [] No  [x] NA   Mask is fitting well  [x] Yes  [] No   Noting Mask Air Leak  [] Yes  [x] No   Having painful Aerophagia  [] Yes  [x] No   Nocturia   0  per night.    Having  HA upon waking  [] Yes  [x] No   Dry mouth upon waking   Dry Nose  Dry Eyes  [] Yes  [x] No   Congestion upon waking   [] Yes  [x] No    Nose Bleeds  [] Yes  [x] No   Using Sleep Aides    [x] NA   Understands how to change humidification and/or tubing temperature for comfort while at home  [x] Yes  [] No     Difficulties falling asleep  [] Yes  [x] No   Difficulties staying asleep  [] Yes  [x] No   Approximate time to bed  10:15pm   Approximate wake time  5:45-6am   Taking Naps  occasional   If taking naps usual length  30-90 min    If taking naps using the machine  [] Yes  [x] No  [] NA [] With and With out    Drowsy when driving  [] Yes  [x] No     Does patient carry a DOT/CDL  [] Yes  [x] No     Does patient carry FAA/Pilots License   [] Yes  [x] No      Any concerns noted with the machine at this time  [] Yes  [x] No        Diagnosis Orders   1. SINTIA treated with BiPAP     2. Primary cardiomyopathy ([facility]     3. Non morbid obesity, unspecified obesity type     4. Chronic systolic CHF (congestive heart failure) ([facility]     5. [facility], severe ([facility]         The chronic medical conditions listed are directly related to the primary diagnosis listed above. The management of the primary diagnosis affects the secondary diagnosis and vice versa. Review of Systems   Constitutional: Negative for appetite change, chills, fatigue and fever. HENT: Negative for congestion, nosebleeds, rhinorrhea and sinus pressure. Eyes: Negative for pain and redness. Respiratory: Negative for apnea, cough and shortness of breath. Cardiovascular: Negative for chest pain and palpitations. Gastrointestinal: Negative for abdominal distention and abdominal pain. Neurological: Negative for dizziness and headaches. Psychiatric/Behavioral: Negative for sleep disturbance.        Social History     Socioeconomic History    Marital status:      Spouse name: Not on file    Number of children: Not on file    Years of education: Not on file    Highest education level: Not on file   Occupational History    Not on file   Social Needs    Financial resource strain: Not on file    Food insecurity:     Worry: Not on file     Inability: Not on file   [employer] needs: Medical: Not on file     Non-medical: Not on file   Tobacco Use    Smoking status: Never Smoker    Smokeless tobacco: Never Used   Substance and Sexual Activity    Alcohol use: No    Drug use: No    Sexual activity: Yes     Partners: Male   Lifestyle    Physical activity:     Days per week: Not on file     Minutes per session: Not on file    Stress: Not on file   Relationships    Social connections:     Talks on phone: Not on file     Gets together: Not on file     Attends Congregation service: Not on file     Active member of club or organization: Not on file     Attends meetings of clubs or organizations: Not on file     Relationship status: Not on file    Intimate partner violence:     Fear of current or ex partner: Not on file     Emotionally abused: Not on file     Physically abused: Not on file     Forced sexual activity: Not on file   Other Topics Concern    Not on file   Social History Narrative    Not on file       Prior to Admission medications    Medication Sig Start Date End Date Taking?  Authorizing Provider   carvedilol (COREG) 12.5 MG tablet TAKE ONE TABLET BY MOUTH TWICE A DAY 10/29/19  Yes Erwin Arevalo MD   Multiple Vitamins-Minerals (MULTIVITAMIN ADULT PO) Take by mouth   Yes Historical Provider, MD   Fluticasone Furoate-Vilanterol (BREO ELLIPTA) 200-25 MCG/INH AEPB INHALE ONE DOSE BY MOUTH DAILY 8/5/19  Yes Cindy Paniagua MD   furosemide (LASIX) 20 MG tablet TAKE ONE TABLET BY MOUTH DAILY AND AN EXTRA DOSE AS NEEDED FOR SWELLING AND WEIGHT GAIN 7/16/19  Yes Erwin Arevalo MD   losartan (COZAAR) 100 MG tablet Take 100 mg by mouth daily   Yes Historical Provider, MD   albuterol sulfate HFA (PROVENTIL HFA) 108 (90 Base) MCG/ACT inhaler Inhale 2 puffs into the lungs every 4 hours as needed for Wheezing or Shortness of Breath 7/9/18  Yes Cindy Paniagua MD   fluticasone (FLONASE) 50 MCG/ACT nasal spray 2 sprays by Nasal route daily  Patient taking differently: 2 sprays by Nasal route daily as needed  7/14/16  Yes Shlomo Ahuja MD   BiPAP Machine MISC by Does not apply route   Yes Historical Provider, MD   metFORMIN (GLUCOPHAGE) 500 MG tablet Take 500 mg by mouth daily (with breakfast). Yes Historical Provider, MD   atorvastatin (LIPITOR) 20 MG tablet Take 20 mg by mouth daily. Yes Historical Provider, MD   aspirin EC 81 MG EC tablet Take 1 tablet by mouth daily. 7/15/13  Yes Lilian Gregory MD   acetaminophen (TYLENOL) 325 MG tablet Take 650 mg by mouth every 6 hours as needed.      Yes Historical Provider, MD       Allergies as of 01/13/2020 - Review Complete 01/13/2020   Allergen Reaction Noted    Lisinopril  07/14/2014       Patient Active Problem List   Diagnosis    Implantable cardioverter-defibrillator (ICD) in situ    Primary cardiomyopathy ([address]    Bradycardia    Tetralogy of Fallot s/p repair    Chronic systolic CHF (congestive heart failure) ([address]    Elevated diaphragm    Hiatal hernia    Hemorrhagic stroke ([address]    Cardiomyopathy ([address]    SINTIA treated with BiPAP    Fatigue    Presence of biventricular AICD    Acute respiratory failure with hypoxia (HCC)    COPD, severe ([address]    Restrictive lung disease    Hoarseness of voice    Mixed hyperlipidemia    Non morbid obesity, unspecified obesity type       Past Medical History:   Diagnosis Date    Bradycardia     Hyperlipidemia     Hypertension     SINTIA treated with BiPAP 8/18/2015    Stroke, hemorrhagic ([address] 5/2015    Type II or unspecified type diabetes mellitus without mention of complication, not stated as uncontrolled        Past Surgical History:   Procedure Laterality Date    CARDIAC CATHETERIZATION  5/2015    CARDIAC DEFIBRILLATOR PLACEMENT      CARDIAC DEFIBRILLATOR PLACEMENT  8/26/15    upgrade ICD to Biv-ICD    CHOLECYSTECTOMY      OVARY REMOVAL         Family History   Problem Relation Age of Onset    Diabetes Mother     High Cholesterol Mother     High Blood Pressure Mother  Heart Disease Father        Vitals:  Weight BMI   Wt Readings from Last 3 Encounters:   01/13/20 227 lb (103 kg)   10/14/19 232 lb (105.2 kg)   08/20/19 226 lb (102.5 kg)    Body mass index is 36.64 kg/m². BP HR SaO2   BP Readings from Last 3 Encounters:   01/13/20 138/74   10/14/19 90/68   08/20/19 99/69    Pulse Readings from Last 3 Encounters:   10/14/19 73   08/20/19 78   04/12/19 77    SpO2 Readings from Last 3 Encounters:   10/14/19 96%   04/12/19 93%   01/21/19 95%        Assessment/Plan:     Primary cardiomyopathy (HCC)  Chronic- Stable. Cont meds per PCP and other physicians. Non morbid obesity, unspecified obesity type  Chronic-Stable. Encouraged her to work on weight loss through diet and exercise. Chronic systolic CHF (congestive heart failure) (HCC)  Chronic- Stable. Cont meds per PCP and other physicians. COPD, severe ([address]  Chronic- Stable. Cont meds per PCP and other physicians. SINTIA treated with BiPAP  Reviewed compliance download with pt. Supplies and parts as needed for her machine. These are medically necessary. Continue medications per her PCP and other physicians. Limit caffeine use after 3pm.  Encouraged her to work on weight loss through diet and exercise. Diagnoses of Primary cardiomyopathy ([address] Non morbid obesity, unspecified obesity type, Chronic systolic CHF (congestive heart failure) ([address] and COPD, severe ([address] were pertinent to this visit. The chronic medical conditions listed are directly related to the primary diagnosis listed above. The management of the primary diagnosis affects the secondary diagnosis and vice versa. The primary encounter diagnosis was SINTIA treated with BiPAP. Diagnoses of Primary cardiomyopathy ([address] Non morbid obesity, unspecified obesity type, Chronic systolic CHF (congestive heart failure) ([address] and COPD, severe ([address] were also pertinent to this visit.   The chronic medical conditions listed are directly related to the primary diagnosis listed above. The management of the primary diagnosis affects the secondary diagnosis and vice versa. - Educated patient and reviewed compliance download with pt.    -Supplies and parts as needed for her machine, these are medically necessary.    - Patient using [employer] for supplies  -Continue medications per her PCP and other physicians.   -Limit caffeine use after 3pm.    -Encouraged her to work on weight loss through diet and exercise. - Educated on SINTIA progression and treating SINTIA on vacation  -F/U: 12 month. No orders of the defined types were placed in this encounter. No orders of the defined types were placed in this encounter. No orders of the defined types were placed in this encounter.       Narayan Lion, MSN, RN, CNP

## 2020-01-13 NOTE — LETTER
[facility] Sleep Medicine  [address] [facility] [address]  Phone: [phone]  Fax: [phone]    January 13, 2020       Patient: Fozia Cedeno   MR Number: [id number]   YOB: 1955   Date of Visit: 1/13/2020       Fozia Cedeno was seen for a follow up visit today. Here is my assessment and plan as well as an attached copy of her visit today:    Primary cardiomyopathy ([address]  Chronic- Stable. Cont meds per PCP and other physicians. Non morbid obesity, unspecified obesity type  Chronic-Stable. Encouraged her to work on weight loss through diet and exercise. Chronic systolic CHF (congestive heart failure) (HCC)  Chronic- Stable. Cont meds per PCP and other physicians. COPD, severe ([address]  Chronic- Stable. Cont meds per PCP and other physicians. SINTIA treated with BiPAP  Reviewed compliance download with pt. Supplies and parts as needed for her machine. These are medically necessary. Continue medications per her PCP and other physicians. Limit caffeine use after 3pm.  Encouraged her to work on weight loss through diet and exercise. Diagnoses of Primary cardiomyopathy ([address] Non morbid obesity, unspecified obesity type, Chronic systolic CHF (congestive heart failure) ([address] and COPD, severe ([address] were pertinent to this visit. The chronic medical conditions listed are directly related to the primary diagnosis listed above. The management of the primary diagnosis affects the secondary diagnosis and vice versa. If you have questions or concerns, please do not hesitate to call me. I look forward to following Chris Jimenes along with you. Sincerely,    Kenrick Anton, JEYSON - CNP    CC providers:   Kadi Alamo MD  [address] [place] [address]: [phone]

## 2020-01-13 NOTE — ASSESSMENT & PLAN NOTE
Reviewed compliance download with pt. Supplies and parts as needed for her machine. These are medically necessary. Continue medications per her PCP and other physicians. Limit caffeine use after 3pm.  Encouraged her to work on weight loss through diet and exercise. Diagnoses of Primary cardiomyopathy ([address] Non morbid obesity, unspecified obesity type, Chronic systolic CHF (congestive heart failure) ([address] and COPD, severe ([address] were pertinent to this visit. The chronic medical conditions listed are directly related to the primary diagnosis listed above. The management of the primary diagnosis affects the secondary diagnosis and vice versa.

## 2020-01-22 RX ORDER — FUROSEMIDE 20 MG/1
TABLET ORAL
Qty: 100 TABLET | Refills: 3 | Status: SHIPPED | OUTPATIENT
Start: 2020-01-22 | End: 2021-03-26

## 2020-02-03 ENCOUNTER — OFFICE VISIT (OUTPATIENT)
Dept: PULMONOLOGY | Age: 65
End: 2020-02-03
Payer: COMMERCIAL

## 2020-02-03 VITALS
OXYGEN SATURATION: 95 % | WEIGHT: 239 LBS | BODY MASS INDEX: 38.58 KG/M2 | DIASTOLIC BLOOD PRESSURE: 66 MMHG | HEART RATE: 70 BPM | SYSTOLIC BLOOD PRESSURE: 134 MMHG

## 2020-02-03 PROCEDURE — 99214 OFFICE O/P EST MOD 30 MIN: CPT | Performed by: INTERNAL MEDICINE

## 2020-02-24 ENCOUNTER — OFFICE VISIT (OUTPATIENT)
Dept: CARDIOLOGY CLINIC | Age: 65
End: 2020-02-24
Payer: COMMERCIAL

## 2020-02-24 VITALS
SYSTOLIC BLOOD PRESSURE: 94 MMHG | BODY MASS INDEX: 36.64 KG/M2 | HEIGHT: 66 IN | DIASTOLIC BLOOD PRESSURE: 72 MMHG | OXYGEN SATURATION: 98 % | WEIGHT: 228 LBS | HEART RATE: 67 BPM

## 2020-02-24 PROBLEM — R06.02 SOB (SHORTNESS OF BREATH): Status: ACTIVE | Noted: 2020-02-24

## 2020-02-24 PROCEDURE — 99214 OFFICE O/P EST MOD 30 MIN: CPT | Performed by: INTERNAL MEDICINE

## 2020-02-24 PROCEDURE — 93290 INTERROG DEV EVAL ICPMS IP: CPT | Performed by: INTERNAL MEDICINE

## 2020-02-24 NOTE — PROGRESS NOTES
[address]   Advanced Heart Failure/Pulmonary Hypertension  Cardiac       Peter Lighter  YOB: 1955    Date of Visit:  2/24/20    Chief Complaint   Patient presents with    Congestive Heart Failure       History of Present Illness:  Mrs. Jossue Mejia is here for ongoing follow up. 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 She has h/o pacer for cardiomyopathy with EF 25%. She saw Dr. Roanna Goltz at [facility] due to her TOF history. She underwent cardiopulmonary stress testing with him. He plans cardiac cath to make sure that her shunts are patent. He suggests she see a pulmonologist to rule out lung disease. She saw Dr. Annia King for a pulmonary workup. She is on a Bi-pap. She had a stroke and was admitted at [facility] treated and discharged with improvement. States that she had a bleed that affected her mildly with thinking and speech and was unable to drive; likely due to BP verses her taking a blood thinner; Had [address] with optival implanted on 11/11/2015. Due to heart failure and wide paced QRS on 8/26/2015 she had Biv upgrade of her device. Her BP has been down historically, and she has been symptomatic. She has been tolerating her bipap machine. Last visit she is seen in follow up for Tetrology of Fallot and nonischemic cardiomyopathy with last EF of 45%. Her Optivol shows her was dry/dehydrated in early March for a few days. She states she had upper respiratoy infection. She had blood work in March for [address]. She had her mammogram, Dexascan, and Pap smear. Labs were reviewed including labs from other hospital systems through [facility]. Cardiac testing was reviewed including echos, nuclear scans, cardiac catheterization, including from other hospital systems through [facility]. Her thyroid was abnormal and the patient was made aware. I will need a renal panel and BNP. Last echo reviewed.   She had a brow lift which was uneventful. Denies chest pain, shortness of breath, syncope, orthopnea, palpitations, or dizziness. Impedance monitoring via Medtronic Optivol Cardiac Compass was downloaded from patient's ICD and reviewed with the patient. The impedance shows stable fluid level. She had an echo at [facility] on 8-8-19. Her EF states \"low normal.\"      Today she states she feels good. Denies chest pain, shortness of breath, syncope, orthopnea, palpitations, or dizziness. Impedance monitoring via [employer]tr[employer] Optivol Cardiac Compass was downloaded from patient's ICD and reviewed. The impedance shows stable fluid level. Her optivol was a little higher in January but has returned to baseline. She took a cruise in January with 19 family members and her diet had increased salt. NYHA Class 2-3    Allergies   Allergen Reactions    Lisinopril      Cough     Current Outpatient Medications   Medication Sig Dispense Refill    furosemide (LASIX) 20 MG tablet TAKE ONE TABLET BY MOUTH DAILY AND AN EXTRA DOSE AS NEEDED FOR SWELLING AND WEIGHT GAIN (Patient taking differently: Take 20 mg by mouth daily ) 100 tablet 3    carvedilol (COREG) 12.5 MG tablet TAKE ONE TABLET BY MOUTH TWICE A DAY 60 tablet 11    Multiple Vitamins-Minerals (MULTIVITAMIN ADULT PO) Take by mouth      Fluticasone Furoate-Vilanterol (BREO ELLIPTA) 200-25 MCG/INH AEPB INHALE ONE DOSE BY MOUTH DAILY 1 each 10    losartan (COZAAR) 100 MG tablet Take 100 mg by mouth daily      albuterol sulfate HFA (PROVENTIL HFA) 108 (90 Base) MCG/ACT inhaler Inhale 2 puffs into the lungs every 4 hours as needed for Wheezing or Shortness of Breath 1 Inhaler 1    fluticasone (FLONASE) 50 MCG/ACT nasal spray 2 sprays by Nasal route daily (Patient taking differently: 2 sprays by Nasal route daily as needed ) 1 Bottle 5    BiPAP Machine MISC by Does not apply route      metFORMIN (GLUCOPHAGE) 500 MG tablet Take 500 mg by mouth daily (with breakfast).  atorvastatin (LIPITOR) 20 MG tablet Take 20 mg by mouth daily.  aspirin EC 81 MG EC tablet Take 1 tablet by mouth daily. 30 tablet 1    acetaminophen (TYLENOL) 325 MG tablet Take 650 mg by mouth every 6 hours as needed. No current facility-administered medications for this visit.       Past Medical History:   Diagnosis Date    Bradycardia     Hyperlipidemia     Hypertension     SINTIA treated with BiPAP 8/18/2015    Stroke, hemorrhagic ([facility] [address] 5/2015    Type II or unspecified type diabetes mellitus without mention of complication, not stated as uncontrolled      Past Surgical History:   Procedure Laterality Date    CARDIAC CATHETERIZATION  5/2015    CARDIAC DEFIBRILLATOR PLACEMENT      CARDIAC DEFIBRILLATOR PLACEMENT  8/26/15    upgrade ICD to Biv-ICD    CHOLECYSTECTOMY      OVARY REMOVAL       Family History   Problem Relation Age of Onset    Diabetes Mother     High Cholesterol Mother     High Blood Pressure Mother     Heart Disease Father      Social History     Socioeconomic History    Marital status:      Spouse name: Not on file    Number of children: Not on file    Years of education: Not on file    Highest education level: Not on file   Occupational History    Not on file   Social Needs    Financial resource strain: Not on file    Food insecurity:     Worry: Not on file     Inability: Not on file    Transportation needs:     Medical: Not on file     Non-medical: Not on file   Tobacco Use    Smoking status: Never Smoker    Smokeless tobacco: Never Used   Substance and Sexual Activity    Alcohol use: No    Drug use: No    Sexual activity: Yes     Partners: Male   Lifestyle    Physical activity:     Days per week: Not on file     Minutes per session: Not on file    Stress: Not on file   Relationships    Social connections:     Talks on phone: Not on file     Gets together: Not on file     Attends [employer] service: Not on file     Active member of club or organization: Not on file     Attends meetings of clubs or organizations: Not on file     Relationship status: Not on file    Intimate partner violence:     Fear of current or ex partner: Not on file     Emotionally abused: Not on file     Physically abused: Not on file     Forced sexual activity: Not on file   Other Topics Concern    Not on file   Social History Narrative    Not on file     Review of Systems:   · Constitutional: there has been no unanticipated weight loss. There's been no change in energy level, sleep pattern, or activity level. · Eyes: No visual changes or diplopia. No scleral icterus. · ENT: No Headaches, hearing loss or vertigo. No mouth sores or sore throat. · Cardiovascular: Reviewed in HPI  · Respiratory: No cough or wheezing, no sputum production. No hematemesis. · Gastrointestinal: No abdominal pain, appetite loss, blood in stools. No change in bowel or bladder habits. · Genitourinary: No dysuria, trouble voiding, or hematuria. · Musculoskeletal:  No gait disturbance, weakness or joint complaints. · Integumentary: No rash or pruritis. · Neurological: No headache, diplopia, change in muscle strength, numbness or tingling. No change in gait, balance, coordination, mood, affect, memory, mentation, behavior. · Psychiatric: No anxiety, no depression. · Endocrine: No malaise, fatigue or temperature intolerance. No excessive thirst, fluid intake, or urination. No tremor. · Hematologic/Lymphatic: No abnormal bruising or bleeding, blood clots or swollen lymph nodes. · Allergic/Immunologic: No nasal congestion or hives. Physical Examination:    BP 94/72   Pulse 67   Ht 5' 6\" (1.676 m)   Wt 228 lb (103.4 kg)   SpO2 98%   BMI 36.80 kg/m²     Vitals:    02/24/20 1545   BP: 94/72   Pulse: 67   SpO2: 98%   Weight: 228 lb (103.4 kg)   Height: 5' 6\" (1.676 m)     Body mass index is 36.8 kg/m².      Wt Readings from Last 3 Encounters:   02/24/20 228 lb (103.4 kg)   02/03/20 239 lb change. Review of the prior echocardiogram, the left ventricular systolic function appears unchanged, low-normal. However, functional assessment is extremely limited. Recommend cardiac MRI given limited acoustic windows. ECHO 7/21/17-- Technically difficult study due to lung interface and limited windows. Patient has history of Tetralogy of Fallot repair with 2 Jazmin-Taussig shunt and atrial septal defect repair. Normal left ventricle size. There is mild concentric left ventricular hypertrophy.   Left ventricular function difficult to estimate due to poor endocardial  definition but appears reduced, likely severely reduced and comparable to  previous two echos. Abnormal septal motion. The left atrium is dilated.   There is trivial tricuspid regurgitation with RVSP estimated at 19 mmHg. [facility]  TOF s/p repair with non trans-annular patch infundibular resection (May 12, [facility])  Limited echocardiographic windows. Unable to quantify ventricular function. No residual ventricular septal defect  Mild tricuspid valve regurgitation  Right ventricle normal in size and systolic function moderately diminished  Paradoxical interventricular septum motion  No right ventricle outflow obstruction  Mild pulmonary valve regurgitation  Trivial mitral valve regurgitation  Left ventricle is mildly dilated and systolic function moderately diminished  No sign of pericardial effusion  Compared to previous echo right ventricle pressure slightly decreased    Echo(3/15) shows normal LV function. Labs were reviewed including labs from other hospital systems through [facility]. Cardiac testing was reviewed including echos, nuclear scans, cardiac catheterization, including from other hospital systems through [facility]. Assessment:    1. Chronic systolic CHF (congestive heart failure) ([facility] [address]    2. Implantable cardioverter-defibrillator (ICD) in situ    3. Tetralogy of Fallot s/p repair    4. SINTIA treated with BiPAP    5. SOB (shortness of breath)    6. Mixed hyperlipidemia    7. Vitamin D insufficiency        1. Chronic systolic CHF (congestive heart failure) ([address]):  Compensated by exam.  -Unable to accurately quantify ventricular function echo for 8-8-19. Left ventricle is mildly dilated and the systolic function is \"low normal.\"    - Abnormal septal motion. EF 40-45 in 7-2017.    - ProBNP on 4-12-19 was 622.  -Echo @ [facility] 4/4/2016 compared with echo on 4/21/2015> the right ventricle pressures are slightly diminished.  -Will need to repeat Echo.      2. Implantable cardioverter-defibrillator (ICD) in situ:  upgraded 11/11/2015. Regular device checks. F/w Dr. David Barnhart. 3. Tetralogy of Fallot s/p repair: --Surgery at age 8 1/2  years   Following with Dr. Marlana Ahumada at [facility] for congential heart disease every 2 years. 4. SINTIA treated with BiPAP:  Wearing Bipap at least 6 nights a week. Continue Bipap use. 5. SOB (shortness of breath):  Denies SOB. Plan:  1. Continue same medications. 2.   Echo in August   3. Labs soon. Need to fast 8 hours. Scribe's attestation: This note was scribed in the presence of Leobardo Grant M.D. by Alpesh Mayer RN     The scribe's documentation has been prepared under my direction and personally reviewed by me in its entirety. I confirm that the note above accurately reflects all work, treatment, procedures, and medical decision making performed by me. QUALITY MEASURES  1. Tobacco Cessation Counseling: NA  2. Retake of BP if >140/90: NA  3. CAD patient on anti-platelet: NA but on ASA  4. CAD patient on STATIN therapy: NA but on lipitor  5. Patient with CHF and aFib on anticoagulation:  NA      I appreciate the opportunity of cooperating in the care of this patient.     Leobardo Grant M.D., [facility]

## 2020-02-27 ENCOUNTER — HOSPITAL ENCOUNTER (OUTPATIENT)
Age: 65
Discharge: HOME | End: 2020-02-27
Payer: COMMERCIAL

## 2020-02-27 LAB
A/G RATIO: 1.5 (ref 1.1–2.2)
ALBUMIN SERPL-MCNC: 4.1 G/DL (ref 3.4–5)
ALP BLD-CCNC: 85 U/L (ref 40–129)
ALT SERPL-CCNC: 16 U/L (ref 10–40)
ANION GAP SERPL CALCULATED.3IONS-SCNC: 14 MMOL/L (ref 3–16)
AST SERPL-CCNC: 17 U/L (ref 15–37)
BILIRUB SERPL-MCNC: 0.9 MG/DL (ref 0–1)
BUN BLDV-MCNC: 18 MG/DL (ref 7–20)
CALCIUM SERPL-MCNC: 9.2 MG/DL (ref 8.3–10.6)
CHLORIDE BLD-SCNC: 98 MMOL/L (ref 99–110)
CHOLESTEROL, TOTAL: 122 MG/DL (ref 0–199)
CO2: 25 MMOL/L (ref 21–32)
CREAT SERPL-MCNC: 0.9 MG/DL (ref 0.6–1.2)
GFR AFRICAN AMERICAN: >60
GFR NON-AFRICAN AMERICAN: >60
GLOBULIN: 2.7 G/DL
GLUCOSE BLD-MCNC: 142 MG/DL (ref 70–99)
HCT VFR BLD CALC: 45.5 % (ref 36–48)
HDLC SERPL-MCNC: 44 MG/DL (ref 40–60)
HEMOGLOBIN: 15 G/DL (ref 12–16)
LDL CHOLESTEROL CALCULATED: 49 MG/DL
MCH RBC QN AUTO: 29.3 PG (ref 26–34)
MCHC RBC AUTO-ENTMCNC: 33 G/DL (ref 31–36)
MCV RBC AUTO: 88.6 FL (ref 80–100)
PDW BLD-RTO: 14.4 % (ref 12.4–15.4)
PLATELET # BLD: 224 K/UL (ref 135–450)
PMV BLD AUTO: 8.6 FL (ref 5–10.5)
POTASSIUM SERPL-SCNC: 5 MMOL/L (ref 3.5–5.1)
PRO-BNP: 583 PG/ML (ref 0–124)
RBC # BLD: 5.13 M/UL (ref 4–5.2)
SODIUM BLD-SCNC: 137 MMOL/L (ref 136–145)
TOTAL PROTEIN: 6.8 G/DL (ref 6.4–8.2)
TRIGL SERPL-MCNC: 147 MG/DL (ref 0–150)
VITAMIN D 25-HYDROXY: 34.9 NG/ML
VLDLC SERPL CALC-MCNC: 29 MG/DL
WBC # BLD: 8.2 K/UL (ref 4–11)

## 2020-02-27 PROCEDURE — 80061 LIPID PANEL: CPT

## 2020-02-27 PROCEDURE — 82306 VITAMIN D 25 HYDROXY: CPT

## 2020-02-27 PROCEDURE — 85027 COMPLETE CBC AUTOMATED: CPT

## 2020-02-27 PROCEDURE — 80053 COMPREHEN METABOLIC PANEL: CPT

## 2020-02-27 PROCEDURE — 36415 COLL VENOUS BLD VENIPUNCTURE: CPT

## 2020-02-27 PROCEDURE — 83880 ASSAY OF NATRIURETIC PEPTIDE: CPT

## 2020-03-03 ENCOUNTER — NURSE ONLY (OUTPATIENT)
Dept: CARDIOLOGY CLINIC | Age: 65
End: 2020-03-03
Payer: COMMERCIAL

## 2020-03-03 PROCEDURE — 93296 REM INTERROG EVL PM/IDS: CPT | Performed by: INTERNAL MEDICINE

## 2020-03-03 PROCEDURE — 93295 DEV INTERROG REMOTE 1/2/MLT: CPT | Performed by: INTERNAL MEDICINE

## 2020-06-08 ENCOUNTER — NURSE ONLY (OUTPATIENT)
Dept: CARDIOLOGY CLINIC | Age: 65
End: 2020-06-08
Payer: COMMERCIAL

## 2020-06-08 PROCEDURE — 93296 REM INTERROG EVL PM/IDS: CPT | Performed by: INTERNAL MEDICINE

## 2020-06-08 PROCEDURE — 93295 DEV INTERROG REMOTE 1/2/MLT: CPT | Performed by: INTERNAL MEDICINE

## 2020-06-08 NOTE — LETTER
[address] [facility] [phone]  [address] [phone]    Pacemaker/Defibrillator Clinic          06/08/20        Clay Sharma  [address] [address]        Dear Clay Sharma    This letter is to inform you that we received the transmission from your monitor at home that checks your implanted heart device. The next date your monitor will automatically transmit will be 12-1-20. If your report needs attention we will notify you. Your device and monitor are wireless and most transmit cellularly, but please periodically check your monitor is still plugged in to the electrical outlet. If you still use the telephone land line to send please ensure the connection to the phone willie is secure. This will help to ensure successful automatic transmissions in the future. Also, the monitor needs to be close to you while sleeping at night. Please be aware that the remote device transmission sites are periodically monitored only during regular business hours during which simultaneous in-office device clinics are being run. If your transmission requires attention, we will contact you as soon as possible. Thank you.             [address]

## 2020-08-19 ENCOUNTER — TELEPHONE (OUTPATIENT)
Dept: CARDIOLOGY CLINIC | Age: 65
End: 2020-08-19

## 2020-08-19 NOTE — TELEPHONE ENCOUNTER
Pt asking if she should get labs done before her 8/31/20 and would like to see if luis f add A-1C.  Please call pt to advise

## 2020-08-24 ENCOUNTER — TELEPHONE (OUTPATIENT)
Dept: CARDIOLOGY CLINIC | Age: 65
End: 2020-08-24

## 2020-08-24 NOTE — TELEPHONE ENCOUNTER
Juliana Fraser calling, only the A1C lab order was placed in Epic, she also needed the CBC, Lipid, CMP, BMP & Vitamin D 25 Hydroxy. She will be coming back to the lab Tuesday 8-25 morning, can these orders be placed in Epic today? Thank you.

## 2020-08-24 NOTE — TELEPHONE ENCOUNTER
Orders placed in Epic. CMP and BMP are overlapping orders. CMP ordered. Called and spoke to pt. She is aware to be fasting.

## 2020-08-24 NOTE — TELEPHONE ENCOUNTER
Pt has reached the DAVID in her ICD. Has OV  Already on 8-31. Will have office staff call to let her know she will be alerted everyday until then.

## 2020-08-24 NOTE — TELEPHONE ENCOUNTER
Patient called back. Spoke with Patient. Talked to her about her device alarm going off every morning until she came into the office and answered all her questions.  Thanks

## 2020-08-25 ENCOUNTER — HOSPITAL ENCOUNTER (OUTPATIENT)
Age: 65
Discharge: HOME | End: 2020-08-25
Payer: COMMERCIAL

## 2020-08-25 LAB
A/G RATIO: 1.2 (ref 1.1–2.2)
ALBUMIN SERPL-MCNC: 3.8 G/DL (ref 3.4–5)
ALP BLD-CCNC: 99 U/L (ref 40–129)
ALT SERPL-CCNC: 16 U/L (ref 10–40)
ANION GAP SERPL CALCULATED.3IONS-SCNC: 12 MMOL/L (ref 3–16)
AST SERPL-CCNC: 16 U/L (ref 15–37)
BILIRUB SERPL-MCNC: 0.6 MG/DL (ref 0–1)
BUN BLDV-MCNC: 13 MG/DL (ref 7–20)
CALCIUM SERPL-MCNC: 9.4 MG/DL (ref 8.3–10.6)
CHLORIDE BLD-SCNC: 102 MMOL/L (ref 99–110)
CHOLESTEROL, TOTAL: 141 MG/DL (ref 0–199)
CO2: 24 MMOL/L (ref 21–32)
CREAT SERPL-MCNC: 0.9 MG/DL (ref 0.6–1.2)
ESTIMATED AVERAGE GLUCOSE: 165.7 MG/DL
GFR AFRICAN AMERICAN: >60
GFR NON-AFRICAN AMERICAN: >60
GLOBULIN: 3.1 G/DL
GLUCOSE BLD-MCNC: 151 MG/DL (ref 70–99)
HBA1C MFR BLD: 7.4 %
HCT VFR BLD CALC: 43.9 % (ref 36–48)
HDLC SERPL-MCNC: 50 MG/DL (ref 40–60)
HEMOGLOBIN: 14.5 G/DL (ref 12–16)
LDL CHOLESTEROL CALCULATED: 61 MG/DL
MCH RBC QN AUTO: 30 PG (ref 26–34)
MCHC RBC AUTO-ENTMCNC: 33.1 G/DL (ref 31–36)
MCV RBC AUTO: 90.8 FL (ref 80–100)
PDW BLD-RTO: 13.6 % (ref 12.4–15.4)
PLATELET # BLD: 193 K/UL (ref 135–450)
PMV BLD AUTO: 8.4 FL (ref 5–10.5)
POTASSIUM SERPL-SCNC: 4.5 MMOL/L (ref 3.5–5.1)
PRO-BNP: 671 PG/ML (ref 0–124)
RBC # BLD: 4.83 M/UL (ref 4–5.2)
SODIUM BLD-SCNC: 138 MMOL/L (ref 136–145)
TOTAL PROTEIN: 6.9 G/DL (ref 6.4–8.2)
TRIGL SERPL-MCNC: 151 MG/DL (ref 0–150)
VITAMIN D 25-HYDROXY: 31.8 NG/ML
VLDLC SERPL CALC-MCNC: 30 MG/DL
WBC # BLD: 9.1 K/UL (ref 4–11)

## 2020-08-25 PROCEDURE — 83880 ASSAY OF NATRIURETIC PEPTIDE: CPT

## 2020-08-25 PROCEDURE — 80053 COMPREHEN METABOLIC PANEL: CPT

## 2020-08-25 PROCEDURE — 80061 LIPID PANEL: CPT

## 2020-08-25 PROCEDURE — 36415 COLL VENOUS BLD VENIPUNCTURE: CPT

## 2020-08-25 PROCEDURE — 85027 COMPLETE CBC AUTOMATED: CPT

## 2020-08-25 PROCEDURE — 83036 HEMOGLOBIN GLYCOSYLATED A1C: CPT

## 2020-08-25 PROCEDURE — 82306 VITAMIN D 25 HYDROXY: CPT

## 2020-08-26 ENCOUNTER — OFFICE VISIT (OUTPATIENT)
Dept: PULMONOLOGY | Age: 65
End: 2020-08-26
Payer: COMMERCIAL

## 2020-08-26 VITALS — SYSTOLIC BLOOD PRESSURE: 138 MMHG | HEART RATE: 81 BPM | DIASTOLIC BLOOD PRESSURE: 78 MMHG | OXYGEN SATURATION: 94 %

## 2020-08-26 PROCEDURE — 99214 OFFICE O/P EST MOD 30 MIN: CPT | Performed by: INTERNAL MEDICINE

## 2020-08-26 RX ORDER — ALBUTEROL SULFATE 90 UG/1
2 AEROSOL, METERED RESPIRATORY (INHALATION) EVERY 4 HOURS PRN
Qty: 1 INHALER | Refills: 5 | Status: SHIPPED | OUTPATIENT
Start: 2020-08-26

## 2020-08-26 NOTE — PROGRESS NOTES
[facility]  Progress Note  Salena Walker MD       Meron Kos   YOB: 1955    Date of Visit:  8/26/2020    Assessment/Plan:  1. COPD, severe (HCC)/SOB  PFT 10/15:  Spirometry reveals decreased FVC at 1.95 L, which is 60% predicted. FEV1 is  decreased at 1.27 L, which is 49% predicted. FEV1/FVC ratio is decreased at  66%. There is no significant change after inhaled bronchodilators. Lung  volumes reveal decreased total lung capacity at 71% predicted. Vital  capacity is decreased at 60% predicted. Diffusion capacity is decreased at  53% predicted. IMPRESSION: Combined severe obstructive and moderate restrictive lung  disease    Breo 200  Add Spiriva Respimat (Anoro made her hoarse)  Albuterol prn    Gradually worsening symptoms  Lifetime nonsmoker  Does not need LDCT chest    2. Elevated diaphragm  Chronic  Stable    3. SINTIA treated with BiPAP  BiPAP  Stable    4. Cardiomyopathy ([address]  EF 25%  Sees Dr Ninfa Cruz her pacer replaced. 5. Restrictive lung disease  2/2 obesity      FOLLOW UP: 6 months    HPI  The patient presents with a chief complaint of moderate to severe shortness of breath related to severe COPD of many years duration. She has mild associated cough. Exertion is a modifying factor. She also has  SINTIA. She uses BIPAP with good treatment effect. She also is using Breo. She feels her SOB is progressively worse. No Chest pain, Nausea or vomiting reported        Review of Systems  As documented in HPI     Allergies   Allergen Reactions    Lisinopril      Cough     Prior to Visit Medications    Medication Sig Taking?  Authorizing Provider   furosemide (LASIX) 20 MG tablet TAKE ONE TABLET BY MOUTH DAILY AND AN EXTRA DOSE AS NEEDED FOR SWELLING AND WEIGHT GAIN  Patient taking differently: Take 20 mg by mouth daily   Leandro Mcgraw MD   carvedilol (COREG) 12.5 MG tablet TAKE ONE TABLET BY MOUTH TWICE A DAY  Leandro Mcgraw MD   Multiple Vitamins-Minerals (MULTIVITAMIN ADULT PO) Take by mouth  Historical Provider, MD   Fluticasone Furoate-Vilanterol (BREO ELLIPTA) 200-25 MCG/INH AEPB INHALE ONE DOSE BY MOUTH DAILY  Femi Bowles MD   losartan (COZAAR) 100 MG tablet Take 100 mg by mouth daily  Historical Provider, MD   albuterol sulfate HFA (PROVENTIL HFA) 108 (90 Base) MCG/ACT inhaler Inhale 2 puffs into the lungs every 4 hours as needed for Wheezing or Shortness of Breath  Femi Bowles MD   fluticasone (FLONASE) 50 MCG/ACT nasal spray 2 sprays by Nasal route daily  Patient taking differently: 2 sprays by Nasal route daily as needed   Teresa Chu MD   BiPAP Machine MISC by Does not apply route  Historical Provider, MD   metFORMIN (GLUCOPHAGE) 500 MG tablet Take 500 mg by mouth daily (with breakfast). Historical Provider, MD   atorvastatin (LIPITOR) 20 MG tablet Take 20 mg by mouth daily. Historical Provider, MD   aspirin EC 81 MG EC tablet Take 1 tablet by mouth daily. Yudelka Ocasio MD   acetaminophen (TYLENOL) 325 MG tablet Take 650 mg by mouth every 6 hours as needed. Historical Provider, MD       Vitals:    08/26/20 1414   BP: 138/78   Pulse: 81   SpO2: 94%     There is no height or weight on file to calculate BMI. Wt Readings from Last 3 Encounters:   02/24/20 228 lb (103.4 kg)   02/03/20 239 lb (108.4 kg)   01/13/20 227 lb (103 kg)     BP Readings from Last 3 Encounters:   08/26/20 138/78   02/24/20 94/72   02/03/20 134/66        Social History     Tobacco Use   Smoking Status Never Smoker   Smokeless Tobacco Never Used       Physical Exam:  Well developed, well nourished  Alert and oriented  Sclera is clear  No cervical adenopathy  No JVD. Chest examination is clear. Cardiac examination reveals regular rate and rhythm without murmur, gallop or rub. The abdomen is soft, nontender and nondistended. There is no clubbing, cyanosis or edema of the extremities. There is no obvious skin rash.   No focal neuro deficicts  Normal mood and affect

## 2020-08-31 ENCOUNTER — OFFICE VISIT (OUTPATIENT)
Dept: CARDIOLOGY CLINIC | Age: 65
End: 2020-08-31
Payer: COMMERCIAL

## 2020-08-31 ENCOUNTER — HOSPITAL ENCOUNTER (OUTPATIENT)
Dept: NON INVASIVE DIAGNOSTICS | Age: 65
Discharge: HOME | End: 2020-08-31
Payer: COMMERCIAL

## 2020-08-31 ENCOUNTER — NURSE ONLY (OUTPATIENT)
Dept: CARDIOLOGY CLINIC | Age: 65
End: 2020-08-31
Payer: COMMERCIAL

## 2020-08-31 VITALS
HEIGHT: 66 IN | SYSTOLIC BLOOD PRESSURE: 118 MMHG | RESPIRATION RATE: 18 BRPM | WEIGHT: 237 LBS | BODY MASS INDEX: 38.09 KG/M2 | DIASTOLIC BLOOD PRESSURE: 81 MMHG | HEART RATE: 83 BPM

## 2020-08-31 VITALS
DIASTOLIC BLOOD PRESSURE: 68 MMHG | OXYGEN SATURATION: 93 % | HEART RATE: 90 BPM | SYSTOLIC BLOOD PRESSURE: 92 MMHG | HEIGHT: 66 IN | WEIGHT: 237 LBS | BODY MASS INDEX: 38.09 KG/M2

## 2020-08-31 PROCEDURE — 99214 OFFICE O/P EST MOD 30 MIN: CPT | Performed by: INTERNAL MEDICINE

## 2020-08-31 PROCEDURE — 93290 INTERROG DEV EVAL ICPMS IP: CPT | Performed by: INTERNAL MEDICINE

## 2020-08-31 PROCEDURE — 93306 TTE W/DOPPLER COMPLETE: CPT

## 2020-08-31 PROCEDURE — 93284 PRGRMG EVAL IMPLANTABLE DFB: CPT | Performed by: INTERNAL MEDICINE

## 2020-08-31 NOTE — PROGRESS NOTES
Patient comes in for programming evaluation for her defibrillator. All sensing and pacing parameters are within normal range. Battery reached DAVID on 8/24/2020. AP 83.3%.  98.6%. Dependent at 30 bpm. No episodes/events noted. No changes need to be made at this time. Please see interrogation for more detail. Optivol is within normal range. Patient will see Dr. Clementine Charles today to discuss Gen Change.

## 2020-08-31 NOTE — PATIENT INSTRUCTIONS
Plan:  1. Continue same medications. 2.   Labs in 6 months FASTING. 3.   See Dr. Greg Koch in 6 months.

## 2020-08-31 NOTE — LETTER
[facility]  [facility]    8/31/20  Barry Kawasaki  1955  EP Procedures   Pacemaker implant (single/dual)  EP Study    ICD implant (single/dual)  Atrial flutter ablation (CORINA Y/N)    Biv implant ICD  Tilt Table    Biv implant PPM  Atrial fibrillation ablation (CORINA Yes)   xxxx Generator Change (BiV ICD)  SVT ablation    Lead revision (RV/LA/RA) (<1 month)  VT ablation      Lead extraction +/- upgrade (BiV/PPM/ICD)  VT Ischemic/ non-ischemic    Loop implant/ removal  VT RVOT    Cardioversion  VT Left sided    CORINA  AVN ablation     Equipment  xxxx Medtronic   CHAVEZ Mapping System    [employer]  Carto Mapping System    [employer]  CryoAblation    Biotronik  Laser Lead Extraction     EP Procedures Scheduling Request  # hours Requested   Scheduled  Date:   Specific Day  Completed    Anesthesia  F/u Date:   CT surgery backup  COVID     Overnight stay      Location [facility]       Pre-Procedure Labs / Imaging   PT/INR  Type & cross    CBC  Units PRBC    BMP/Mg  Units FFP    Venogram  Cardiac CTA for Pulmonary vein mapping     RN INITIALS: RA    Patient Instructions  Do not eat or drink after midnight the night prior to procedure  Dx:Device RRT 8/24/2020

## 2020-08-31 NOTE — PROGRESS NOTES
[facility], [place]   Advanced Heart Failure/Pulmonary Hypertension  Cardiac       Tonna Rolling  YOB: 1955    Date of Visit:  8/31/20    Chief Complaint   Patient presents with    Congestive Heart Failure       History of Present Illness:  Mrs. Tian Gamez is here for ongoing follow up. 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 She has h/o pacer for cardiomyopathy with EF 25%. She saw Dr. Nyasia Vegas at [facility] due to her TOF history. She underwent cardiopulmonary stress testing with him. He plans cardiac cath to make sure that her shunts are patent. He suggests she see a pulmonologist to rule out lung disease. She saw Dr. Jelena Calix for a pulmonary workup. She is on a Bi-pap. She had a stroke and was admitted at [facility] [place] treated and discharged with improvement. States that she had a bleed that affected her mildly with thinking and speech and was unable to drive; likely due to BP verses her taking a blood thinner; Had [address] with optival implanted on 11/11/2015. Due to heart failure and wide paced QRS on 8/26/2015 she had Biv upgrade of her device. Her BP has been down historically, and she has been symptomatic. She has been tolerating her Bipap machine. She has an abnormal thyroid. She had an echo at [facility] on 8-8-19. Her EF states \"low normal.\"      Today she is being seen in follow up for Tetrology of Fallot and nonischemic cardiomyopathy with last EF of 45%. Labs reviewed and A1C is 7.4. She is feeling good and working from home doing many Zoom calls. She is elevating her legs while working. She is getting 4632-2770 steps a day and working on her arms with barbells. Labs reviewed and cholesterol was very good. She has an upcoming PCP appointment. She will see Dr. Sindi Malik to discuss generator change. Impedance monitoring via [employer] was downloaded from patient's ICD and reviewed.   The impedance shows stable fluid level. She had an echo today       NYHA Class 2-3    Allergies   Allergen Reactions    Lisinopril      Cough     Current Outpatient Medications   Medication Sig Dispense Refill    Fluticasone furoate-vilanterol (BREO ELLIPTA) 200-25 MCG/INH AEPB inhaler INHALE ONE DOSE BY MOUTH DAILY **MUST CALL MD FOR APPOINTMENT 1 each 11    albuterol sulfate HFA (PROVENTIL HFA) 108 (90 Base) MCG/ACT inhaler Inhale 2 puffs into the lungs every 4 hours as needed for Wheezing or Shortness of Breath 1 Inhaler 5    furosemide (LASIX) 20 MG tablet TAKE ONE TABLET BY MOUTH DAILY AND AN EXTRA DOSE AS NEEDED FOR SWELLING AND WEIGHT GAIN (Patient taking differently: Take 20 mg by mouth daily ) 100 tablet 3    carvedilol (COREG) 12.5 MG tablet TAKE ONE TABLET BY MOUTH TWICE A DAY 60 tablet 11    Multiple Vitamins-Minerals (MULTIVITAMIN ADULT PO) Take by mouth      losartan (COZAAR) 100 MG tablet Take 100 mg by mouth daily      fluticasone (FLONASE) 50 MCG/ACT nasal spray 2 sprays by Nasal route daily (Patient taking differently: 2 sprays by Nasal route daily as needed ) 1 Bottle 5    BiPAP Machine MISC by Does not apply route      metFORMIN (GLUCOPHAGE) 500 MG tablet Take 500 mg by mouth daily (with breakfast).  atorvastatin (LIPITOR) 20 MG tablet Take 20 mg by mouth daily.  aspirin EC 81 MG EC tablet Take 1 tablet by mouth daily. 30 tablet 1    acetaminophen (TYLENOL) 325 MG tablet Take 650 mg by mouth every 6 hours as needed.  tiotropium (SPIRIVA RESPIMAT) 2.5 MCG/ACT AERS inhaler Inhale 1 puff into the lungs daily (Patient not taking: Reported on 8/31/2020) 1 Inhaler 11     No current facility-administered medications for this visit.       Past Medical History:   Diagnosis Date    Bradycardia     Hyperlipidemia     Hypertension     SINTIA treated with BiPAP 8/18/2015    Stroke, hemorrhagic ([facility] [address] 5/2015    Type II or unspecified type diabetes mellitus without mention of complication, not stated as uncontrolled      Past Surgical History:   Procedure Laterality Date    CARDIAC CATHETERIZATION  5/2015    CARDIAC DEFIBRILLATOR PLACEMENT      CARDIAC DEFIBRILLATOR PLACEMENT  8/26/15    upgrade ICD to Biv-ICD    CHOLECYSTECTOMY      OVARY REMOVAL       Family History   Problem Relation Age of Onset    Diabetes Mother     High Cholesterol Mother     High Blood Pressure Mother     Heart Disease Father      Social History     Socioeconomic History    Marital status:      Spouse name: Not on file    Number of children: Not on file    Years of education: Not on file    Highest education level: Not on file   Occupational History    Not on file   Social Needs    Financial resource strain: Not on file    Food insecurity     Worry: Not on file     Inability: Not on file    Transportation needs     Medical: Not on file     Non-medical: Not on file   Tobacco Use    Smoking status: Never Smoker    Smokeless tobacco: Never Used   Substance and Sexual Activity    Alcohol use: No    Drug use: No    Sexual activity: Yes     Partners: Male   Lifestyle    Physical activity     Days per week: Not on file     Minutes per session: Not on file    Stress: Not on file   Relationships    Social connections     Talks on phone: Not on file     Gets together: Not on file     Attends [employer] service: Not on file     Active member of club or organization: Not on file     Attends meetings of clubs or organizations: Not on file     Relationship status: Not on file    Intimate partner violence     Fear of current or ex partner: Not on file     Emotionally abused: Not on file     Physically abused: Not on file     Forced sexual activity: Not on file   Other Topics Concern    Not on file   Social History Narrative    Not on file     Review of Systems:   · Constitutional: there has been no unanticipated weight loss.  There's been no change in energy level, sleep pattern, or activity level.     · Eyes: No visual changes or diplopia. No scleral icterus. · ENT: No Headaches, hearing loss or vertigo. No mouth sores or sore throat. · Cardiovascular: Reviewed in HPI  · Respiratory: No cough or wheezing, no sputum production. No hematemesis. · Gastrointestinal: No abdominal pain, appetite loss, blood in stools. No change in bowel or bladder habits. · Genitourinary: No dysuria, trouble voiding, or hematuria. · Musculoskeletal:  No gait disturbance, weakness or joint complaints. · Integumentary: No rash or pruritis. · Neurological: No headache, diplopia, change in muscle strength, numbness or tingling. No change in gait, balance, coordination, mood, affect, memory, mentation, behavior. · Psychiatric: No anxiety, no depression. · Endocrine: No malaise, fatigue or temperature intolerance. No excessive thirst, fluid intake, or urination. No tremor. · Hematologic/Lymphatic: No abnormal bruising or bleeding, blood clots or swollen lymph nodes. · Allergic/Immunologic: No nasal congestion or hives. Physical Examination:    BP 92/68   Pulse 90   Ht 5' 6\" (1.676 m)   Wt 237 lb (107.5 kg)   SpO2 93%   BMI 38.25 kg/m²     Vitals:    08/31/20 1057   BP: 92/68   Pulse: 90   SpO2: 93%   Weight: 237 lb (107.5 kg)   Height: 5' 6\" (1.676 m)     Body mass index is 38.25 kg/m². Wt Readings from Last 3 Encounters:   08/31/20 237 lb (107.5 kg)   02/24/20 228 lb (103.4 kg)   02/03/20 239 lb (108.4 kg)     BP Readings from Last 3 Encounters:   08/31/20 92/68   08/26/20 138/78   02/24/20 94/72     Constitutional and General Appearance:   WD/WN in NAD  HEENT:  NC/AT  Respiratory:  · Normal excursion and expansion without use of accessory muscles  · Resp Auscultation: Normal breath sounds without dullness.   Cardiovascular:  · The apical impulses not displaced  · Heart tones are crisp and normal  · Cervical veins are not engorged  · The carotid upstroke is normal in amplitude and contour without delay or bruit  · JVP normal  RRR with nl S1 and S2 without m,r,g  · Peripheral pulses are symmetrical and full  · There is no clubbing, cyanosis of the extremities. · 1+ edema LE, L>R  · Femoral Arteries: 2+ and equal  · Pedal Pulses: 2+ and equal   Abdomen:  · No masses or tenderness  · Liver/Spleen: No Abnormalities Noted  Neurological/Psychiatric:  · Alert and oriented in all spheres  · Moves all extremities well  · Exhibits normal gait balance and coordination  · No abnormalities of mood, affect, memory, mentation, or behavior are noted    Diagnostics:    Echo 8-8-19  At [facility]   1. Tetralogy of Fallot      - s/p repair with non trans-annular patch infundibular resection (5/12/1963, [facility]). 2. Limited echocardiographic windows. Unable to accurately quantify ventricular function. 3. Left ventricle is mildly dilated and the systolic function is low normal.   4. Right ventricle is borderline dilated and the systolic function is moderately diminished. 5. Paradoxical interventricular septum motion. 6. TR jet velocity estimates RV pressure to be ~33 mmHg + CVP. 7. Mild tricuspid valve regurgitation. 8. There is no obvious residual septal defect. 9. Dilated right atrium. 10. No right ventricular outflow tract obstruction. 11. There is no significant pericardial effusion. 12. Compared to the previous echocardiogram of 4/4/2016, there is no significant change. Review of the prior echocardiogram, the left ventricular systolic function appears unchanged, low-normal. However, functional assessment is extremely limited. Recommend cardiac MRI given limited acoustic windows. ECHO 7/21/17-- Technically difficult study due to lung interface and limited windows. Patient has history of Tetralogy of Fallot repair with 2 Jazmin-Taussig shunt and atrial septal defect repair. Normal left ventricle size.  There is mild concentric left ventricular hypertrophy.   Left ventricular function difficult to estimate due to poor endocardial  definition but appears reduced, likely severely reduced and comparable to  previous two echos. Abnormal septal motion. The left atrium is dilated.   There is trivial tricuspid regurgitation with RVSP estimated at 19 mmHg. [facility]  TOF s/p repair with non trans-annular patch infundibular resection (May [birth year], [facility])  Limited echocardiographic windows. Unable to quantify ventricular function. No residual ventricular septal defect  Mild tricuspid valve regurgitation  Right ventricle normal in size and systolic function moderately diminished  Paradoxical interventricular septum motion  No right ventricle outflow obstruction  Mild pulmonary valve regurgitation  Trivial mitral valve regurgitation  Left ventricle is mildly dilated and systolic function moderately diminished  No sign of pericardial effusion  Compared to previous echo right ventricle pressure slightly decreased    Echo(3/15) shows normal LV function. Labs were reviewed including labs from other hospital systems through [facility]. Cardiac testing was reviewed including echos, nuclear scans, cardiac catheterization, including from other hospital systems through [facility]. Assessment:    1. Chronic systolic CHF (congestive heart failure) ([address]    2. Implantable cardioverter-defibrillator (ICD) in situ    3. Tetralogy of Fallot s/p repair    4. SINTIA treated with BiPAP    5. SOB (shortness of breath)    6. Vitamin D insufficiency    7. Mixed hyperlipidemia        1. Chronic systolic CHF (congestive heart failure) ([address]):  Compensated by exam.   -Echo pending and estimated is 45%. -Denies SOB. No increase in swelling.    --Echo @ [facility] 4/4/2016 compared with echo on 4/21/2015> the right ventricle pressures are slightly diminished. 2. Implantable cardioverter-defibrillator (ICD) in situ:   upgraded 11/11/2015. Regular device checks. F/w Dr. Jazmin Raya today post device check.

## 2020-08-31 NOTE — PATIENT INSTRUCTIONS
You are scheduled for a Battery change    Our  will call you to discuss a date for you procedure. The Cath Lab will call you a week before your procedure. The night before your procedure you will need to scrub with Hibiclens wash. The day of your procedure you will need to check in at the registration desk, which is in the main lobby at [place] [id number]. will need to fast for at least 8 hours prior to you procedure. You may take all other medications with a sip of water the morning of your procedure. Please have a responsible adult to drive you home upon discharge. The discharging unit will be giving you discharge instructions. If you have any questions regarding your procedure itself or your medications, please call [phone] and ask to talk to an EP nurse. You will be seen in the office in 1 week for a wound check and then 3 months following implantation.

## 2020-09-09 ENCOUNTER — TELEPHONE (OUTPATIENT)
Dept: CARDIOLOGY CLINIC | Age: 65
End: 2020-09-09

## 2020-09-09 NOTE — TELEPHONE ENCOUNTER
Pt calling with a question about traveling out of state before her pacemaker change appt?  Pt says she will be in [place] back on 10/02 pls call to advise thank you

## 2020-09-09 NOTE — TELEPHONE ENCOUNTER
Last OV 8/31/20  Last Labs 8/25/20  Generator Change 10/14/20 RM  Making sure she will not have to quarantine for two weeks because of COVID precautions.

## 2020-10-05 ENCOUNTER — TELEPHONE (OUTPATIENT)
Dept: CARDIOLOGY CLINIC | Age: 65
End: 2020-10-05

## 2020-10-05 NOTE — TELEPHONE ENCOUNTER
Pt calling asking what work restrictions she will have after pacemaker change?  pls call to advise thank you

## 2020-10-14 ENCOUNTER — HOSPITAL ENCOUNTER (OUTPATIENT)
Dept: CARDIAC CATH/INVASIVE PROCEDURES | Age: 65
Discharge: HOME | End: 2020-10-14
Attending: INTERNAL MEDICINE | Admitting: INTERNAL MEDICINE
Payer: COMMERCIAL

## 2020-10-14 VITALS
TEMPERATURE: 98 F | RESPIRATION RATE: 18 BRPM | HEART RATE: 71 BPM | SYSTOLIC BLOOD PRESSURE: 128 MMHG | OXYGEN SATURATION: 95 % | WEIGHT: 228 LBS | BODY MASS INDEX: 36.64 KG/M2 | HEIGHT: 66 IN | DIASTOLIC BLOOD PRESSURE: 87 MMHG

## 2020-10-14 LAB
ANION GAP SERPL CALCULATED.3IONS-SCNC: 10 MMOL/L (ref 3–16)
BUN BLDV-MCNC: 13 MG/DL (ref 7–20)
CALCIUM SERPL-MCNC: 9.3 MG/DL (ref 8.3–10.6)
CHLORIDE BLD-SCNC: 103 MMOL/L (ref 99–110)
CO2: 27 MMOL/L (ref 21–32)
CREAT SERPL-MCNC: 0.9 MG/DL (ref 0.6–1.2)
GFR AFRICAN AMERICAN: >60
GFR NON-AFRICAN AMERICAN: >60
GLUCOSE BLD-MCNC: 184 MG/DL (ref 70–99)
HCT VFR BLD CALC: 44.1 % (ref 36–48)
HEMOGLOBIN: 14.4 G/DL (ref 12–16)
MCH RBC QN AUTO: 29.5 PG (ref 26–34)
MCHC RBC AUTO-ENTMCNC: 32.6 G/DL (ref 31–36)
MCV RBC AUTO: 90.5 FL (ref 80–100)
PDW BLD-RTO: 14.1 % (ref 12.4–15.4)
PLATELET # BLD: 216 K/UL (ref 135–450)
PMV BLD AUTO: 7.9 FL (ref 5–10.5)
POTASSIUM SERPL-SCNC: 4.4 MMOL/L (ref 3.5–5.1)
RBC # BLD: 4.88 M/UL (ref 4–5.2)
SODIUM BLD-SCNC: 140 MMOL/L (ref 136–145)
WBC # BLD: 11 K/UL (ref 4–11)

## 2020-10-14 PROCEDURE — 93005 ELECTROCARDIOGRAM TRACING: CPT | Performed by: INTERNAL MEDICINE

## 2020-10-14 PROCEDURE — 99153 MOD SED SAME PHYS/QHP EA: CPT

## 2020-10-14 PROCEDURE — C1882 AICD, OTHER THAN SING/DUAL: HCPCS

## 2020-10-14 PROCEDURE — 33264 RMVL & RPLCMT DFB GEN MLT LD: CPT

## 2020-10-14 PROCEDURE — 99152 MOD SED SAME PHYS/QHP 5/>YRS: CPT | Performed by: INTERNAL MEDICINE

## 2020-10-14 PROCEDURE — 2580000003 HC RX 258

## 2020-10-14 PROCEDURE — 2500000003 HC RX 250 WO HCPCS

## 2020-10-14 PROCEDURE — 85027 COMPLETE CBC AUTOMATED: CPT

## 2020-10-14 PROCEDURE — 33264 RMVL & RPLCMT DFB GEN MLT LD: CPT | Performed by: INTERNAL MEDICINE

## 2020-10-14 PROCEDURE — 80048 BASIC METABOLIC PNL TOTAL CA: CPT

## 2020-10-14 PROCEDURE — 99152 MOD SED SAME PHYS/QHP 5/>YRS: CPT

## 2020-10-14 PROCEDURE — 36415 COLL VENOUS BLD VENIPUNCTURE: CPT

## 2020-10-14 PROCEDURE — 2780000010 HC IMPLANT OTHER

## 2020-10-14 PROCEDURE — 6360000002 HC RX W HCPCS

## 2020-10-14 NOTE — H&P
[address]   Electrophysiology Follow up   Date: 10/14/2020    CC: Follow up on BiV-ICD    HPI: Corinna Angel is a 77 y.o. female here for a follow up visit. She has history of Tetrology of Fallot s/p repair in 1963 with a Jazmin-Taussig shunt and VSD howie patch. She had an AICD placed for cardiomyopathy with EF 25% in 2008. Device was dual chamber [employer] [facility] with [place] lead. She is pacemaker dependant and is being V paced more than 99%. She underwent LHC/RHC at [address] [place] [address] on July 9, 2015. Coronaries were normal, and EF reported 35%. Tetralogy of fallot repair 12/5/1963   Dr Pio Mcgrath; Both Jazmin-Taussig shunts ligated. Extreme infundibular stenosis. Normal cors noted. Repeated aortic cross-clamps noted, none exceeding 12 mins. Transverse incision into RV. Infundibulum extensively resected from PA side as impossible to identify from below. Marked aortic dextroposition noted. Large VSD closed with howie patch. No TAP. Initially CHB. \"The following is copied from [facility], Dr. Angeles Saavedra: \"Myla had surgical repair of tetralogy of Fallot at the of 15 years by Dr. Pio Mcgrath at [facility]. These were in the early days of myocardial protection as well as in the early days of cardiopulmonary bypass. The surgery was notable for the absence of a trans annular patch, and also for a transverse rather short RV incision. The infundibulum was very tightly (3mm) stenosed and repeated bouts of aortic cross clamp were required to deal with this and the VSD closure. This clearly placed her myocardium at risk of ischemic injury. In 2008 she presented with heart block and an ejection fraction off 25%, and was fitted with an endovascular cardio defibrillator with AV sequential pacing ability. \"    She follows Dr. Don Lang for SINTIA and also nocturnal hypoxemia. Due to heart failure and wide paced QRS on 8/26/2015 she had Biv upgrade of her device.  Upgrade was very challenging and required snaring of the LV lead. Her device recently reached RRT and we discussed the need to change her battery. She is doing well from a cardiac standpoint. Past Medical History:   Diagnosis Date    Bradycardia     Hyperlipidemia     Hypertension     SINTIA treated with BiPAP 8/18/2015    Stroke, hemorrhagic ([facility] [address] 5/2015    Type II or unspecified type diabetes mellitus without mention of complication, not stated as uncontrolled         Past Surgical History:   Procedure Laterality Date    CARDIAC CATHETERIZATION  5/2015    CARDIAC DEFIBRILLATOR PLACEMENT      CARDIAC DEFIBRILLATOR PLACEMENT  8/26/15    upgrade ICD to Biv-ICD    CHOLECYSTECTOMY      OVARY REMOVAL         Allergies   Allergen Reactions    Lisinopril      Cough       Medication:   No current facility-administered medications for this encounter. Social History:   reports that she has never smoked. She has never used smokeless tobacco. She reports that she does not drink alcohol or use drugs. Family History:  family history includes Diabetes in her mother; Heart Disease in her father; High Blood Pressure in her mother; High Cholesterol in her mother. Review of System:    · General: negative for fever, chills   · Ophthalmic ROS: negative for - eye pain or loss of vision  · ENT ROS: negative for - headaches, sore throat    · Respiratory: negative for - cough, sputum  · Cardiovascular: Reviewed in HPI  · Gastrointestinal: negative for - abdominal pain, diarrhea, N/V  · Hematology: negative for - bleeding, blood clots, bruising or jaundice  · Genito-Urinary:  negative for - Dysuria or incontinence  · Musculoskeletal: negative for - Joint swelling, muscle pain  · Neurological: negative for - confusion, dizziness, headaches   · Psychiatric: No anxiety, no depression.   · Dermatological: negative for - rash    Physical Examination:  Vitals:    10/14/20 1106   BP: 128/87   Pulse: 71   Resp: 18   Temp: 98 °F (36.7 °C)   SpO2: 95%     · Constitutional: Oriented. No distress. · Head: Normocephalic and atraumatic. · Mouth/Throat: Oropharynx is clear and moist.   · Eyes: Conjunctivae normal. EOM are normal.   · Neck: Neck supple. No JVD present. · Cardiovascular: Normal rate, regular rhythm, Z9&U2.  + Systolic murmur  · Pulmonary/Chest: Bilateral respiratory sounds. No rhonchi. · Abdominal: Soft. No tenderness. · Musculoskeletal: No tenderness. No edema    · Lymphadenopathy: Has no cervical adenopathy. · Neurological: Alert and oriented. Follows command, No Gross deficit   · Skin: Skin is warm, No rash noted. · Psychiatric: Has a normal behavior       Labs:  Lab Results   Component Value Date    TSHREFLEX 4.04 03/20/2015    CREATININE 0.9 08/25/2020    CREATININE 0.9 02/27/2020    AST 16 08/25/2020    ALT 16 08/25/2020         ECG 8/31/20  None    Echo 8/31/2020  Pending     Echo 8/8/19  Summary:     1. Tetralogy of Fallot      - s/p repair with non trans-annular patch infundibular resection (5/12/1965, [place]). 2. Limited echocardiographic windows. Unable to accurately quantify ventricular function. 3. Left ventricle is mildly dilated and the systolic function is low normal.   4. Right ventricle is borderline dilated and the systolic function is moderately diminished. 5. Paradoxical interventricular septum motion. 6. TR jet velocity estimates RV pressure to be ~33 mmHg + CVP. 7. Mild tricuspid valve regurgitation. 8. There is no obvious residual septal defect. 9. Dilated right atrium. 10. No right ventricular outflow tract obstruction. 11. There is no significant pericardial effusion. 12. Compared to the previous echocardiogram of 4/4/2016, there is no significant change. Review of the prior echocardiogram, the left ventricular systolic function appears unchanged, low-normal. However, functional assessment is extremely limited.  Recommend cardiac MRI given limited acoustic windows. Echo 7/21/17:  Technically difficult study due to lung interface and limited windows.   Patient has history of Tetralogy of Fallot repair with 2 Jazmin-Taussig   shunt and atrial septal defect repair.   Normal left ventricle size. There is mild concentric left ventricular   hypertrophy.   Left ventricular function difficult to estimate due to poor endocardial   definition but appears reduced, likely severely reduced and comparable to   previous two echos.   Abnormal septal motion.   The left atrium is dilated.   There is trivial tricuspid regurgitation with RVSP estimated at 19 mmHg. [facility]  TOF s/p repair with non trans-annular patch infundibular resection (May [birth year], 1963 [facility])  Limited echocardiographic windows. Unable to quantify ventricular function. No residual ventricular septal defect  Mild tricuspid valve regurgitation  Right ventricle normal in size and systolic function moderately diminished  Paradoxical interventricular septum motion  No right ventricle outflow obstruction  Mild pulmonary valve regurgitation  Trivial mitral valve regurgitation  Left ventricle is mildly dilated and systolic function moderately diminished  No sign of pericardial effusion  Compared to previous echo right ventricle pressure slightly decreased    E[facility] 5/6/15 [facility]):   Left ventricle: Poorly visualized. The cavity size was mildly dilated. Systolic function was mildly to moderately reduced. Images were inadequate for LV wall motion assessment and dyssynchrony assessment. Aortic valve: Trivial regurgitation. Right ventricle: Poorly visualized. Objective parameters of systolic function were low normal to mild reduced: TAPSE: 1.8cm. Tricuspid annular systolic velocity: 9.1HP/E. Cardiac Anatomy  Left ventricle:   Poorly visualized. The cavity size was mildly dilated. Systolic function was mildly to moderately reduced.  Images were inadequate for LV wall motion assessment and  Elevated diaphragm    Hiatal hernia    [facility] [address]    Cardiomyopathy ([facility] [address]    SINTIA treated with BiPAP    Fatigue    Presence of biventricular AICD    Acute respiratory failure with hypoxia (HCC)    COPD, severe (HCC)    Restrictive lung disease    Hoarseness of voice    Mixed hyperlipidemia    Non morbid obesity, unspecified obesity type    SOB (shortness of breath)     Assessment and plan:       Non-ischemic CMP, LV systolic dysfunction with history of prior EF 35% reported by cardiac cath, at [facility] heart, s/p dual chamber AICD in 2008, pacemaker dependent with > 99% V pacing      Device interrogation today showed that the device is RRT. Patient is pacemaker dependent. Discussed generator replacement. Risk-benefit alternative discussed with the patient. She opted to proceed. -Follows up with heart failure clinic and [facility].   -I reviewed device interrogation today. Device functions normally. AP 83.3%  98.6% RRT reached 8/24/2020   The risks, benefits and alternatives of the procedure were discussed with the patient. The risks including, but not limited to, the risks of bleeding, infection, pain, device malfunction, lead dislodgement, radiation exposure, injury to cardiac and surrounding structures (including pneumothorax), stroke, cardiac perforation, tamponade, need for emergent heart surgery, myocardial infarction and death were discussed in detail. Dual vs single chamber device, including risks and benefits were discussed with patient. HTN  Vitals:    10/14/20 1106   BP: 128/87   Pulse: 71   Resp: 18   Temp: 98 °F (36.7 °C)   SpO2: 95%      -Home BP monitoring encourage with a BP goal <130/80        Obesity  Body mass index is 36.8 kg/m². - Excessive weight is complicating assessment and treatment.  It is placing patient at risk for multiple co-morbidities as well as early death and contributing to the patient's presentation. - discussed weight management with diet and exercise      Schedule Generator change    All questions and concerns were addressed to the patient/family. Alternatives to my treatment were discussed. Clif Fowler MD, MPH  [facility]   Office: [phone]     H&P Update    I have reviewed the history and physical and examined the patient and updated with relevant changes. Consent: I have discussed with the patient and/or the patient representative the indication, alternatives, and the possible risks and/or complications of the planned procedure and the anesthesia methods. The patient and/or patient representative appear to understand and agree to proceed. Vitals:    10/14/20 1106   BP: 128/87   Pulse: 71   Resp: 18   Temp: 98 °F (36.7 °C)   SpO2: 95%     Prior to Admission medications    Medication Sig Start Date End Date Taking? Authorizing Provider   furosemide (LASIX) 20 MG tablet TAKE ONE TABLET BY MOUTH DAILY AND AN EXTRA DOSE AS NEEDED FOR SWELLING AND WEIGHT GAIN  Patient taking differently: Take 20 mg by mouth daily  1/22/20  Yes Douglas Almaraz MD   carvedilol (COREG) 12.5 MG tablet TAKE ONE TABLET BY MOUTH TWICE A DAY 10/29/19  Yes Douglas Almaraz MD   Multiple Vitamins-Minerals (MULTIVITAMIN ADULT PO) Take by mouth   Yes Historical Provider, MD   losartan (COZAAR) 100 MG tablet Take 100 mg by mouth daily   Yes Historical Provider, MD   metFORMIN (GLUCOPHAGE) 500 MG tablet Take 500 mg by mouth daily (with breakfast). Yes Historical Provider, MD   atorvastatin (LIPITOR) 20 MG tablet Take 20 mg by mouth daily. Yes Historical Provider, MD   aspirin EC 81 MG EC tablet Take 1 tablet by mouth daily.  7/15/13  Yes Marily Cobb MD   Fluticasone furoate-vilanterol (BREO ELLIPTA) 200-25 MCG/INH AEPB inhaler INHALE ONE DOSE BY MOUTH DAILY **MUST CALL MD FOR APPOINTMENT 8/28/20   Karo Maxwell MD   albuterol sulfate HFA (PROVENTIL HFA) 108 (90 Base) MCG/ACT inhaler Inhale 2 puffs into the lungs every 4 hours as needed for Wheezing or Shortness of Breath 8/26/20   Tammy Sharma MD   tiotropium (SPIRIVA RESPIMAT) 2.5 MCG/ACT AERS inhaler Inhale 1 puff into the lungs daily 8/26/20   Tammy Sharma MD   fluticasone [facility]) 50 MCG/ACT nasal spray 2 sprays by Nasal route daily  Patient taking differently: 2 sprays by Nasal route daily as needed  7/14/16   Beti March MD   BiPAP Machine MISC by Does not apply route    Historical Provider, MD   acetaminophen (TYLENOL) 325 MG tablet Take 650 mg by mouth every 6 hours as needed. Historical Provider, MD     Past Medical History:   Diagnosis Date    Bradycardia     Hyperlipidemia     Hypertension     SINTIA treated with BiPAP 8/18/2015    Stroke, hemorrhagic ([facility] [address] 5/2015    Type II or unspecified type diabetes mellitus without mention of complication, not stated as uncontrolled      Past Surgical History:   Procedure Laterality Date    CARDIAC CATHETERIZATION  5/2015    CARDIAC DEFIBRILLATOR PLACEMENT      CARDIAC DEFIBRILLATOR PLACEMENT  8/26/15    upgrade ICD to Biv-ICD    CHOLECYSTECTOMY      OVARY REMOVAL       Allergies   Allergen Reactions    Lisinopril      Cough       Pre-Sedation Documentation and Exam:   I have personally completed a history, physical exam & review of systems for this patient (see notes).     Mallampati Airway Assessment:  Class I     Prior History of Anesthesia Complications:   None    ASA Classification:  Class 3 - A patient with severe systemic disease that limits activity but is not incapacitating    Sedation/ Anesthesia Plan:   Intravenous sedation    Medications Planned:   Midazolam (Versed) and Fentanyl intravenously    Patient is an appropriate candidate for plan of sedation:   Yes    Electronically signed by Georgie Saucedo MD on 10/14/2020 at 11:13 AM

## 2020-10-14 NOTE — PROCEDURES
[address]     Electrophysiology Procedure Note       Date of Procedure: 10/14/2020  Patient's Name: Calin Carbajal  YOB: 1955   Medical Record Number: [id number]  Referring Physician: Bipin Hein MD  Procedure Performed by: Bipin Hein MD    Procedures performed:    · Generator change out of Biv-AICD   · Electronic analysis of lead and device. · IV sedation. · Sedation start time: 11:47  · Sedation stop time: 12:04     Indication of the procedure: Biv-AICD battery depletion  Calin Carbajal is a 77 y.o. female history of Tetrology of Fallot s/p repair in 1963 with a Jazmin-Taussig shunt and VSD howie patch. She has cardiomyopathy with EF 25% in 2008. She is pacemaker dependant  Biv upgrade on 8/6/2015. Device battery is DAVID and she is here for generator change out. Details of procedure: The risks, benefits and alternatives of the procedure were discussed with the patient. The risks including, but not limited to, the risks of bleeding, infection, pain, device malfunction, lead dislodgement, injury to cardiac and surrounding structures, stroke, and death were discussed in detail. The patient opted to proceed with the device implantation. Written informed consent was signed and placed in the chart. Patient was brought to the electrophysiology lab in a fasting nonsedated state. Prophylactic antibiotic was given. Chest was prepped and draped in the usual sterile fashion. After injection of 2% lidocaine in the left pectoral area, an incision was made over the previous scar and extended to the pocket using electrocautery and blunt dissection. The old device was removed. The leads were tested and showed stable parameters. The pocket was vigorously irrigated with antibiotic solution and the leads were connected to the new pulse generator device which was implanted into the clean pocket. The pulse generator was sutured inside the pocket.  TYRX was used to reduce risk of infection. The pocket was then closed in three separate subcutaneous layers using 2-0 & 3-0 Vicryl and subcuticular layer using 4-0 Vicryl. The skin was covered with Steri- strips and pressure dressing. All sponge and needle counts were reported as correct at the end of the procedure. The patient tolerated the procedure well and there were no complications. EBL less than 20 ml. DEVICE and LEAD information:  The device is [employer] # [id number] with SN# [id number] implant day 10/14/2020   Atrial lead: #[id number] with serial number# [id number] implant date: 8/29/2008     RV lead: # 7120-65 with serial number# [id number] implant date: 8/29/2008   LV lead: # [id number] with serial number# [id number] implant date: 8/26/2015    Device was programmed to DDD with lower rate of 50 and upper tracking rate of 130. Plan:   The patient will have usual post-implant care. Patient can be discharged home if remains stable with follow up in arrhythmia clinic.

## 2020-10-15 LAB
EKG ATRIAL RATE: 70 BPM
EKG DIAGNOSIS: NORMAL
EKG Q-T INTERVAL: 476 MS
EKG QRS DURATION: 192 MS
EKG QTC CALCULATION (BAZETT): 517 MS
EKG R AXIS: -49 DEGREES
EKG T AXIS: 116 DEGREES
EKG VENTRICULAR RATE: 71 BPM

## 2020-10-15 RX ORDER — CARVEDILOL 12.5 MG/1
TABLET ORAL
Qty: 180 TABLET | Refills: 10 | Status: SHIPPED | OUTPATIENT
Start: 2020-10-15 | End: 2021-12-15

## 2020-10-20 ENCOUNTER — NURSE ONLY (OUTPATIENT)
Dept: CARDIOLOGY CLINIC | Age: 65
End: 2020-10-20
Payer: COMMERCIAL

## 2020-10-20 PROCEDURE — 93284 PRGRMG EVAL IMPLANTABLE DFB: CPT | Performed by: INTERNAL MEDICINE

## 2020-10-20 NOTE — PROGRESS NOTES
Patient comes in for programming evaluation for her defibrillator. All sensing and pacing parameters are within normal range. High LV threshold on 16-Oct-2020. Today 2.75V @ 0.40ms. Battery life 4.5 years  AP 71.2%.  98.6%. No episodes noted. Patient remains on Coreg. Patients incision is healing nicely. Patient to call the office immediately with any signs on infection. No new leads and Gen Change. Today we lowered the outputs in both chambers to preserve battery life. Please see interrogation for more detail. Optivol is initializing. Patient will follow up in 3 months in office or remotely. HM paired with new device.

## 2020-11-19 ENCOUNTER — TELEPHONE (OUTPATIENT)
Dept: PULMONOLOGY | Age: 65
End: 2020-11-19

## 2021-01-12 ENCOUNTER — OFFICE VISIT (OUTPATIENT)
Dept: CARDIOLOGY CLINIC | Age: 66
End: 2021-01-12
Payer: COMMERCIAL

## 2021-01-12 VITALS
WEIGHT: 239.3 LBS | DIASTOLIC BLOOD PRESSURE: 56 MMHG | BODY MASS INDEX: 38.46 KG/M2 | OXYGEN SATURATION: 94 % | HEART RATE: 63 BPM | HEIGHT: 66 IN | SYSTOLIC BLOOD PRESSURE: 90 MMHG

## 2021-01-12 DIAGNOSIS — E78.2 MIXED HYPERLIPIDEMIA: ICD-10-CM

## 2021-01-12 DIAGNOSIS — Z95.810 IMPLANTABLE CARDIOVERTER-DEFIBRILLATOR (ICD) IN SITU: Chronic | ICD-10-CM

## 2021-01-12 DIAGNOSIS — Z87.74 TETRALOGY OF FALLOT S/P REPAIR: Chronic | ICD-10-CM

## 2021-01-12 DIAGNOSIS — G47.33 OSA TREATED WITH BIPAP: Chronic | ICD-10-CM

## 2021-01-12 DIAGNOSIS — I50.22 CHRONIC SYSTOLIC CHF (CONGESTIVE HEART FAILURE) (HCC): Primary | Chronic | ICD-10-CM

## 2021-01-12 PROCEDURE — 93290 INTERROG DEV EVAL ICPMS IP: CPT | Performed by: NURSE PRACTITIONER

## 2021-01-12 PROCEDURE — 99214 OFFICE O/P EST MOD 30 MIN: CPT | Performed by: NURSE PRACTITIONER

## 2021-01-12 NOTE — PROGRESS NOTES
[facility]     [facility] Follow Up Note    CHIEF COMPLAINT / HPI: Hospital Follow Up secondary to chronic systolic heart failure     Hospital record has been reviewed  Hospital Course progressed as follows per discharge summary:     10/14/20  Hospital Course:  Ms. Alyssa Acharya came in for a scheduled generator change out of Biv-AICD. Device is [employer] and programmed to DDD with lower rate of 50 and upper tracking rate of 130. Lorri Backer is 77 y.o. female who presents today for a routine follow up after a recent hospitalization related to the above mentioned issues. Subjective:   Since the time of discharge, the patient admits that she \"feels pretty good. \" She continues to have some shortness of breath, but thinks it is due to poor conditioning. She has bicycle pedals at home and she tries to use those regularly. Keegan Sharma states she hasn't been weighing herself recently but just started Weight [employer] and got a health  and plans on weighing in regularly. She denies swelling, chest pain, palpitations, dizziness. She denies any issues with her incision site.       Past Medical History:   Diagnosis Date    Bradycardia     Hyperlipidemia     Hypertension     SINTIA treated with BiPAP 8/18/2015    Stroke, hemorrhagic ([facility] [address] 5/2015    Type II or unspecified type diabetes mellitus without mention of complication, not stated as uncontrolled      Social History:    Social History     Tobacco Use   Smoking Status Never Smoker   Smokeless Tobacco Never Used     Current Medications:  Current Outpatient Medications   Medication Sig Dispense Refill    carvedilol (COREG) 12.5 MG tablet TAKE ONE TABLET BY MOUTH TWICE A  tablet 10    Fluticasone furoate-vilanterol (BREO ELLIPTA) 200-25 MCG/INH AEPB inhaler INHALE ONE DOSE BY MOUTH DAILY **MUST CALL MD FOR APPOINTMENT 1 each 11  albuterol sulfate HFA (PROVENTIL HFA) 108 (90 Base) MCG/ACT inhaler Inhale 2 puffs into the lungs every 4 hours as needed for Wheezing or Shortness of Breath 1 Inhaler 5    tiotropium (SPIRIVA RESPIMAT) 2.5 MCG/ACT AERS inhaler Inhale 1 puff into the lungs daily 1 Inhaler 11    furosemide (LASIX) 20 MG tablet TAKE ONE TABLET BY MOUTH DAILY AND AN EXTRA DOSE AS NEEDED FOR SWELLING AND WEIGHT GAIN (Patient taking differently: Take 20 mg by mouth daily ) 100 tablet 3    Multiple Vitamins-Minerals (MULTIVITAMIN ADULT PO) Take by mouth      losartan (COZAAR) 100 MG tablet Take 100 mg by mouth daily      fluticasone (FLONASE) 50 MCG/ACT nasal spray 2 sprays by Nasal route daily (Patient taking differently: 2 sprays by Nasal route daily as needed ) 1 Bottle 5    BiPAP Machine MISC by Does not apply route      metFORMIN (GLUCOPHAGE) 500 MG tablet Take 500 mg by mouth daily (with breakfast).  atorvastatin (LIPITOR) 20 MG tablet Take 20 mg by mouth daily.  aspirin EC 81 MG EC tablet Take 1 tablet by mouth daily. 30 tablet 1    acetaminophen (TYLENOL) 325 MG tablet Take 650 mg by mouth every 6 hours as needed. No current facility-administered medications for this visit. REVIEW OF SYSTEMS:   CONSTITUTIONAL: No major weight gain or loss, fatigue, weakness, night sweats or fever. There's been no change in energy level, sleep pattern, or activity level. HEENT: No new vision difficulties or ringing in the ears. RESPIRATORY: No new SOB, PND, orthopnea or cough. CARDIOVASCULAR: See HPI  GI: No nausea, vomiting, diarrhea, constipation, abdominal pain or changes in bowel habits. : No urinary frequency, urgency, incontinence hematuria or dysuria. SKIN: No cyanosis or skin lesions. MUSCULOSKELETAL: No new muscle or joint pain. NEUROLOGICAL: No syncope or TIA-like symptoms.   PSYCHIATRIC: No anxiety, pain, insomnia or depression    Objective:   PHYSICAL EXAM: VITALS:  BP (!) 90/56   Pulse 63   Ht 5' 6\" (1.676 m)   Wt 239 lb 4.8 oz (108.5 kg)   SpO2 94%   BMI 38.62 kg/m²     CONSTITUTIONAL: Cooperative, no apparent distress, and appears well nourished / developed obese   NEUROLOGIC:  Awake and orientated to person, place and time. PSYCH: Calm affect. SKIN: Warm and dry. HEENT: Sclera non-icteric, normocephalic, neck supple, no elevation of JVP, normal carotid pulses with no bruits and thyroid normal size. LUNGS:  No increased work of breathing and clear to auscultation, no crackles or wheezing. CARDIOVASCULAR:  Regular rate and rhythm with no murmurs, gallops, rubs, or abnormal heart sounds, normal PMI. The apical impulses not displaced. Heart tones are crisp and normal                                   ABDOMEN:  Normal bowel sounds, non-distended and non-tender to palpation   EXT: No edema, no calf tenderness. Pulses are present bilaterally.     DATA:    Lab Results   Component Value Date    ALT 16 08/25/2020    AST 16 08/25/2020    ALKPHOS 99 08/25/2020    BILITOT 0.6 08/25/2020     Lab Results   Component Value Date    CREATININE 0.9 10/14/2020    BUN 13 10/14/2020     10/14/2020    K 4.4 10/14/2020     10/14/2020    CO2 27 10/14/2020     No results found for: TSH, C5BJJQC, Y3HVCMW, THYROIDAB  Lab Results   Component Value Date    WBC 11.0 10/14/2020    HGB 14.4 10/14/2020    HCT 44.1 10/14/2020    MCV 90.5 10/14/2020     10/14/2020     No components found for: CHLPL  Lab Results   Component Value Date    TRIG 151 (H) 08/25/2020    TRIG 147 02/27/2020    TRIG 132 10/19/2019     Lab Results   Component Value Date    HDL 50 08/25/2020    HDL 44 02/27/2020    HDL 57 10/19/2019     Lab Results   Component Value Date    LDLCALC 61 08/25/2020    LDLCALC 49 02/27/2020    LDLCALC 51 10/19/2019     Lab Results   Component Value Date    LABVLDL 30 08/25/2020    LABVLDL 29 02/27/2020    LABVLDL 26 10/19/2019 Radiology Review:  Pertinent images / reports were reviewed as a part of this visit and reveals the following:    Last Echo 8/31/20:   Summary   HISTORY - TETROLOGY OF FALLOT WITH REPAIRS   *Left ventricle - normal size and thickness, indeterminate function due to   very poor endocardial definition   *Left atrium - dilated   *Right ventricle - dilated, indeterminate function due to poor endocardial   definition but appears reduced   *Pacer / ICD wire is visualized in the right ventricle. *Tricuspid valve - mild regurgitation with PASP of 56mmHg      Consider CORINA for better assessment of LV/RV function, wall motion, valvular   function    Last Angiogram 7/9/2015:  (At [facility])  Normal coronaries, EF 35%  HEMODYNAMICS: LVEDP: 15, LVEF:40%, RVEF30%  No gradient across the aortic valve, Gradient across the pulmonic valve:  peak gradient was 8 mmHg, mean gradient was 6 mmHg. Mild Transventicular patch leak, shunt was calculated to be not significant at 0.94. Moderate transpulmonic valve gradient. Narrowing of the pulmonic annulus, with no signficant stenosis of the pulmonic valve appreciated. Last ECG 10/14/20:  AV sequential or dual chamber electronic pacemaker  When compared with ECG of 26-AUG-2015 12:40,  Vent. rate has increased BY 10 BPM    Assessment:      Diagnosis Orders   1. Chronic systolic CHF (congestive heart failure) ([address]   ~ s/p ICD (2008)  ~ optivol check today: thoracic impedance below baseline   ~ follows with Dr. Allie Delacruz   ~ ARB / coreg   ~ appears compensated  OptiVol   2. SINTIA treated with BiPAP   ~ follows with Dr. Babak Jimenes   ~ compliant with CPAP     3. Tetralogy of Fallot s/p repair   ~ stable  ~ repaired 12/5/63 with a Jazmin-Taussig shutn and VSD howie patch    4.  Implantable cardioverter-defibrillator (ICD) in situ   ~ s/p ICD 2008  ~ s/p generator change (10/14/20) ~ last device check 10/20/20: all sensing / pacing parameters are within normal range. Battery life 4.5 years, AP 71.2 %,  98.6% OptiVol   5. Mixed hyperlipidemia   ~ atorvastatin 20 mg  ~ lipids (8/25/20) , HDL 50, LDL 61, Trig 151      Patient is stable since hospital discharge. Plan:   1. Stable, schedule follow up with Dr. Azael Babb in 6 months   2. Follow ups with Dr. Jyoti Prince and Dr. Oscar Marquez scheduled    I have addressed the patient's cardiac risk factors and adjusted pharmacologic treatment as needed. In addition, I have reinforced the need for patient directed risk factor modification. Further evaluation will be based upon the patient's clinical course and testing results. All questions and concerns were addressed to the patient. The patient  Currently is not smoking. The risks related to smoking were reviewed with the patient. Recommend maintaining a smoke-free lifestyle. Agiotension receptor blocker has been prescribed / recommended for congestive heart failure. Daily weight, low sodium diet were discussed. Patient instructed to call the office with a weight gain: > 3 # over night or 5# in one week; swelling, SOB/orthopnea/PND    Dual Antiplatelet therapy / anti-coagulation has not been recommended / prescribed for this patient. Pt is on a BB  Pt is on an ace-i/ARB  Pt is on a statin      Saturated fat diet discussed  Exercise program discussed    Thank you for allowing to us to participate in the care of [facility]  Documentation of today's visit sent to PCP

## 2021-01-18 ENCOUNTER — VIRTUAL VISIT (OUTPATIENT)
Dept: PULMONOLOGY | Age: 66
End: 2021-01-18
Payer: COMMERCIAL

## 2021-01-18 DIAGNOSIS — J44.9 COPD, SEVERE (HCC): Chronic | ICD-10-CM

## 2021-01-18 DIAGNOSIS — E66.9 NON MORBID OBESITY, UNSPECIFIED OBESITY TYPE: Chronic | ICD-10-CM

## 2021-01-18 DIAGNOSIS — G47.33 OSA TREATED WITH BIPAP: Primary | Chronic | ICD-10-CM

## 2021-01-18 DIAGNOSIS — I50.22 CHRONIC SYSTOLIC HEART FAILURE (HCC): ICD-10-CM

## 2021-01-18 DIAGNOSIS — I42.9 PRIMARY CARDIOMYOPATHY (HCC): Chronic | ICD-10-CM

## 2021-01-18 PROCEDURE — 99443 PR PHYS/QHP TELEPHONE EVALUATION 21-30 MIN: CPT | Performed by: NURSE PRACTITIONER

## 2021-01-18 ASSESSMENT — SLEEP AND FATIGUE QUESTIONNAIRES
HOW LIKELY ARE YOU TO NOD OFF OR FALL ASLEEP WHILE SITTING QUIETLY AFTER LUNCH WITHOUT ALCOHOL: 0
HOW LIKELY ARE YOU TO NOD OFF OR FALL ASLEEP WHILE WATCHING TV: 0
HOW LIKELY ARE YOU TO NOD OFF OR FALL ASLEEP IN A CAR, WHILE STOPPED FOR A FEW MINUTES IN TRAFFIC: 0
HOW LIKELY ARE YOU TO NOD OFF OR FALL ASLEEP WHEN YOU ARE A PASSENGER IN A CAR FOR AN HOUR WITHOUT A BREAK: 0
ESS TOTAL SCORE: 3

## 2021-01-18 NOTE — ASSESSMENT & PLAN NOTE
Reviewed compliance download with pt. Supplies and parts as needed for her machine. These are medically necessary. Continue medications per her PCP and other physicians. Limit caffeine use after 3pm.  Encouraged her to work on weight loss through diet and exercise. Diagnoses of Primary cardiomyopathy ([address] Non morbid obesity, unspecified obesity type, Chronic systolic heart failure ([address] and COPD, severe ([address] were pertinent to this visit. The chronic medical conditions listed are directly related to the primary diagnosis listed above. The management of the primary diagnosis affects the secondary diagnosis and vice versa.

## 2021-01-18 NOTE — PROGRESS NOTES
Pantego Jac         : 1955    Diagnosis: [x] SINTIA (G47.33) [] CSA (G47.31) [] Apnea (G47.30)   Length of Need: [x] 12 Months [] 99 Months [] Other:    Machine (YULISA!): [x] Respironics Dream Station      Auto [] ResMed AirSense     Auto [] Other:     []  CPAP () [] Bilevel ()   Mode: [] Auto [] Spontaneous    Mode: [] Auto [] Spontaneous                            Comfort Settings:   - Ramp Pressure:  cmH2O                                        - Ramp time: 15 min                                     -  Flex/EPR - 3 full time                                    - For ResMed Bilevel (TiMax-4 sec   TiMin- 0.2 sec)     Humidifier: [x] Heated ()        [x] Water chamber replacement ()/ 1 per 6 months        Mask:   [x] Nasal () /1 per 3 months [] Full Face () /1 per 3 months   [x] Patient choice -Size and fit mask [] Patient Choice - Size and fit mask   [] Dispense:  [] Dispense:    [x] Headgear () / 1 per 3 months [] Headgear () / 1 per 3 months   [] Replacement Nasal Cushion ()/2 per month [] Interface Replacement ()/1 per month   [x] Replacement Nasal Pillows ()/2 per month         Tubing: [x] Heated ()/1 per 3 months    [] Standard ()/1 per 3 months [] Other:           Filters: [x] Non-disposable ()/1 per 6 months     [x] Ultra-Fine, Disposable ()/2 per month        Miscellaneous: [] Chin Strap ()/ 1 per 6 months [] O2 bleed-in:       LPM   [] Oximetry on CPAP/Bilevel []  Other:    [x] Modem: ()         Start Order Date: 21    MEDICAL JUSTIFICATION:  I, the undersigned, certify that the above prescribed supplies are medically necessary for this patients wellbeing. In my opinion, the supplies are both reasonable and necessary in reference to accepted standards of medicalpractice in treatment of this patients condition.     Jeffrey Low, JEYSON - BASILIA      NPI: [id number]       Order Signed Date: 21    Electronically signed by JEYSON Garnica CNP on 2021 at 2:55 PM    Michaela Griffiths  1955  [address] [place] [address]  [phone] (home) [phone] (work)  [phone] (mobile)      Insurance Info (confirm with patient if correct):  Payor/Plan Subscr  Sex Relation Sub.  Ins. ID Effective Group Num

## 2021-01-18 NOTE — PROGRESS NOTES
Rajni Jama MD, FAASM, [facility]  Yusra Allen, MSN, RN, CNP     [address] [address]  Dept: [phone]  Dept Fax: [phone]: [address] [address] [phone]    Subjective:     Patient ID: Anne Fang is a 77 y.o. female. Chief Complaint   Patient presents with    Sleep Apnea       HPI:     Sleep Medicine Telephone Visit    Pursuant to the emergency declaration under the [address] [place] [address], [facility] waiver authority and the [employer] Act this Telephone Visit was insisted, with patient's consent, to reduce the patient's risk of exposure to COVID-19 and provide continuity of care for an established patient. Services were provided through a synchronous discussion over a telephone and/or Video chat to substitute for in-person clinic visit, and coded as such. While patient is at home. Machine Modem/Download Info:  Compliance (hours/night): 5.2 hrs/night  Download AHI (/hour): 1.3 /HR     Average IPAP Pressure: 11.3 cmH2O  Average EPAP Pressure: 6.6 cmH2O         AUTO BIPAP - Settings (Lina)  IPAP Max: 20 cmH2O  EPAP Min: 6 cmH2O  Pressure Support Min: 3  Pressure Support Max: 6             Comfort Settings  Humidity Level (0-8): 4  Flex/EPR (0-3): 2 PAP Mask  Mask Type: Nasal pillows  Clinically Relevant Leak: No     [place] - Total score: 3    Follow-up :     Last Visit : January 2020      Patient reports the listed chronic Co-morbidities: Cardiomyopathy, CFH, COPD, Obesity    are well controlled and stable at this time.      Subjective Health Changes: None      Over Night Oximetry: [] Yes  [] No  [x] NA [] WNL   Using O2: [] Yes  [] No  [x] NA   Patient is compliant with the machine  [x] Yes  [] No Feeling rested when using the machine   [x] Yes  [] No     Pressure is comfortable with inspiration and expiration  [x] Yes  [] No     Noticed changes in pressure   [] Yes  [] No  [x] NA   Mask is fitting well  [x] Yes  [] No   Noting Mask Air Leak  [] Yes  [x] No   Having painful Aerophagia  [] Yes  [x] No   Nocturia   0-1  per night. Having  HA upon waking  [] Yes  [x] No   Dry mouth upon waking   Dry Nose  Dry Eyes  [x] Yes  [] No   Congestion upon waking   [] Yes  [x] No    Nose Bleeds  [] Yes  [x] No   Using Sleep Aides    [x] NA   Understands how to change humidification and/or tubing temperature for comfort while at home  [x] Yes  [] No     Difficulties falling asleep  [] Yes  [x] No   Difficulties staying asleep  [] Yes  [x] No   Approximate time to bed  10:15pm   Approximate wake time  6:30am   Taking Naps  occasional   If taking naps usual length  2.5 hours    If taking naps using the machine  [] Yes  [x] No  [] NA [] With and With out    Drowsy when driving  [] Yes  [x] No     Does patient carry a DOT/CDL  [] Yes  [x] No     Does patient carry FAA/Pilots License   [] Yes  [x] No      Any concerns noted with the machine at this time  [] Yes  [x] No        Diagnosis Orders   1. SINTIA treated with BiPAP     2. Primary cardiomyopathy ([facility] [address]     3. Non morbid obesity, unspecified obesity type     4. Chronic systolic heart failure ([facility] [address]     5. COPD, severe ([address]         The chronic medical conditions listed are directly related to the primary diagnosis listed above. The management of the primary diagnosis affects the secondary diagnosis and vice versa. Assessment/Plan:     Primary cardiomyopathy ([address]  Chronic- Stable. Cont meds per PCP and other physicians. Non morbid obesity, unspecified obesity type  Chronic-Stable. Encouraged her to work on weight loss through diet and exercise. Chronic systolic heart failure (HCC)  Chronic- Stable. Cont meds per PCP and other physicians. COPD, severe ([address]  Chronic- Stable. Cont meds per PCP and other physicians. SINTIA treated with BiPAP  Reviewed compliance download with pt. Supplies and parts as needed for her machine. These are medically necessary. Continue medications per her PCP and other physicians. Limit caffeine use after 3pm.  Encouraged her to work on weight loss through diet and exercise. Diagnoses of Primary cardiomyopathy ([address] Non morbid obesity, unspecified obesity type, Chronic systolic heart failure ([address] and COPD, severe ([address] were pertinent to this visit. The chronic medical conditions listed are directly related to the primary diagnosis listed above. The management of the primary diagnosis affects the secondary diagnosis and vice versa. The primary encounter diagnosis was SINTIA treated with BiPAP. Diagnoses of Primary cardiomyopathy ([address] Non morbid obesity, unspecified obesity type, Chronic systolic heart failure ([address] and COPD, severe ([address] were also pertinent to this visit. The chronic medical conditions listed are directly related to the primary diagnosis listed above. The management of the primary diagnosis affects the secondary diagnosis and vice versa. - Educated patient and reviewed compliance download with pt.    -Supplies and parts as needed for her machine, these are medically necessary.    - Patient using [address] for supplies  -Continue medications per her PCP and other physicians.   -Limit caffeine use after 3pm.    -Encouraged her to work on weight loss through diet and exercise. - Patient not able to access video feed. Visit completed via telephone communications. 25 min spent with patient.   - Educated on napping with the machine   -F/U: 12 month. No orders of the defined types were placed in this encounter. No orders of the defined types were placed in this encounter. No orders of the defined types were placed in this encounter. Ramona Jiménez, MSN, RN, CNP

## 2021-01-20 ENCOUNTER — NURSE ONLY (OUTPATIENT)
Dept: CARDIOLOGY CLINIC | Age: 66
End: 2021-01-20
Payer: COMMERCIAL

## 2021-01-20 DIAGNOSIS — I50.22 CHRONIC SYSTOLIC HEART FAILURE (HCC): ICD-10-CM

## 2021-01-20 DIAGNOSIS — Z95.810 CARDIAC DEFIBRILLATOR IN PLACE: ICD-10-CM

## 2021-01-20 DIAGNOSIS — I42.8 NON-ISCHEMIC CARDIOMYOPATHY (HCC): ICD-10-CM

## 2021-01-20 PROCEDURE — 93297 REM INTERROG DEV EVAL ICPMS: CPT | Performed by: INTERNAL MEDICINE

## 2021-01-20 PROCEDURE — 93296 REM INTERROG EVL PM/IDS: CPT | Performed by: INTERNAL MEDICINE

## 2021-01-20 PROCEDURE — 93295 DEV INTERROG REMOTE 1/2/MLT: CPT | Performed by: INTERNAL MEDICINE

## 2021-01-20 NOTE — LETTER
[facility] [phone]  [address] [address] [phone]    Pacemaker/Defibrillator Clinic          01/20/21        Michaela Griffiths  [place] [address]        Dear Michaela Griffiths    This letter is to inform you that we received the transmission from your monitor at home that checks your implanted heart device. The next date your monitor will automatically transmit will be 6-14-21. If your report needs attention we will notify you. Your device and monitor are wireless and most transmit cellularly, but please periodically check your monitor is still plugged in to the electrical outlet. If you still use the telephone land line to send please ensure the connection to the phone willie is secure. This will help to ensure successful automatic transmissions in the future. Also, the monitor needs to be close to you while sleeping at night. Please be aware that the remote device transmission sites are periodically monitored only during regular business hours during which simultaneous in-office device clinics are being run. If your transmission requires attention, we will contact you as soon as possible. Thank you.             Talha

## 2021-03-05 ENCOUNTER — TELEPHONE (OUTPATIENT)
Dept: CARDIOLOGY CLINIC | Age: 66
End: 2021-03-05

## 2021-03-06 ENCOUNTER — HOSPITAL ENCOUNTER (OUTPATIENT)
Age: 66
Discharge: HOME | End: 2021-03-06
Payer: COMMERCIAL

## 2021-03-06 DIAGNOSIS — I50.22 CHRONIC SYSTOLIC CHF (CONGESTIVE HEART FAILURE) (HCC): Chronic | ICD-10-CM

## 2021-03-06 DIAGNOSIS — E78.2 MIXED HYPERLIPIDEMIA: ICD-10-CM

## 2021-03-06 DIAGNOSIS — E55.9 VITAMIN D INSUFFICIENCY: ICD-10-CM

## 2021-03-06 DIAGNOSIS — Z87.74 TETRALOGY OF FALLOT S/P REPAIR: ICD-10-CM

## 2021-03-06 DIAGNOSIS — R06.02 SOB (SHORTNESS OF BREATH): ICD-10-CM

## 2021-03-06 LAB
A/G RATIO: 1.2 (ref 1.1–2.2)
ALBUMIN SERPL-MCNC: 3.8 G/DL (ref 3.4–5)
ALP BLD-CCNC: 106 U/L (ref 40–129)
ALT SERPL-CCNC: 18 U/L (ref 10–40)
ANION GAP SERPL CALCULATED.3IONS-SCNC: 10 MMOL/L (ref 3–16)
AST SERPL-CCNC: 22 U/L (ref 15–37)
BILIRUB SERPL-MCNC: 0.7 MG/DL (ref 0–1)
BUN BLDV-MCNC: 12 MG/DL (ref 7–20)
CALCIUM SERPL-MCNC: 9.2 MG/DL (ref 8.3–10.6)
CHLORIDE BLD-SCNC: 100 MMOL/L (ref 99–110)
CHOLESTEROL, TOTAL: 134 MG/DL (ref 0–199)
CO2: 29 MMOL/L (ref 21–32)
CREAT SERPL-MCNC: 1 MG/DL (ref 0.6–1.2)
GFR AFRICAN AMERICAN: >60
GFR NON-AFRICAN AMERICAN: 55
GLOBULIN: 3.3 G/DL
GLUCOSE BLD-MCNC: 151 MG/DL (ref 70–99)
HCT VFR BLD CALC: 45.5 % (ref 36–48)
HDLC SERPL-MCNC: 44 MG/DL (ref 40–60)
HEMOGLOBIN: 14.9 G/DL (ref 12–16)
LDL CHOLESTEROL CALCULATED: 67 MG/DL
MCH RBC QN AUTO: 29.5 PG (ref 26–34)
MCHC RBC AUTO-ENTMCNC: 32.7 G/DL (ref 31–36)
MCV RBC AUTO: 90.2 FL (ref 80–100)
PDW BLD-RTO: 14.1 % (ref 12.4–15.4)
PLATELET # BLD: 249 K/UL (ref 135–450)
PMV BLD AUTO: 8.5 FL (ref 5–10.5)
POTASSIUM SERPL-SCNC: 4.5 MMOL/L (ref 3.5–5.1)
PRO-BNP: 371 PG/ML (ref 0–124)
RBC # BLD: 5.04 M/UL (ref 4–5.2)
SODIUM BLD-SCNC: 139 MMOL/L (ref 136–145)
TOTAL PROTEIN: 7.1 G/DL (ref 6.4–8.2)
TRIGL SERPL-MCNC: 116 MG/DL (ref 0–150)
VITAMIN D 25-HYDROXY: 31.6 NG/ML
VLDLC SERPL CALC-MCNC: 23 MG/DL
WBC # BLD: 9.3 K/UL (ref 4–11)

## 2021-03-06 PROCEDURE — 83880 ASSAY OF NATRIURETIC PEPTIDE: CPT

## 2021-03-06 PROCEDURE — 80053 COMPREHEN METABOLIC PANEL: CPT

## 2021-03-06 PROCEDURE — 82306 VITAMIN D 25 HYDROXY: CPT

## 2021-03-06 PROCEDURE — 36415 COLL VENOUS BLD VENIPUNCTURE: CPT

## 2021-03-06 PROCEDURE — 85027 COMPLETE CBC AUTOMATED: CPT

## 2021-03-06 PROCEDURE — 80061 LIPID PANEL: CPT

## 2021-03-08 ENCOUNTER — OFFICE VISIT (OUTPATIENT)
Dept: CARDIOLOGY CLINIC | Age: 66
End: 2021-03-08
Payer: COMMERCIAL

## 2021-03-08 ENCOUNTER — NURSE ONLY (OUTPATIENT)
Dept: CARDIOLOGY CLINIC | Age: 66
End: 2021-03-08
Payer: COMMERCIAL

## 2021-03-08 VITALS
WEIGHT: 230 LBS | SYSTOLIC BLOOD PRESSURE: 136 MMHG | HEART RATE: 82 BPM | BODY MASS INDEX: 36.96 KG/M2 | DIASTOLIC BLOOD PRESSURE: 78 MMHG | OXYGEN SATURATION: 94 % | HEIGHT: 66 IN

## 2021-03-08 DIAGNOSIS — I42.8 NON-ISCHEMIC CARDIOMYOPATHY (HCC): ICD-10-CM

## 2021-03-08 DIAGNOSIS — G47.33 OSA TREATED WITH BIPAP: ICD-10-CM

## 2021-03-08 DIAGNOSIS — Z95.810 AUTOMATIC IMPLANTABLE CARDIOVERTER-DEFIBRILLATOR IN SITU: ICD-10-CM

## 2021-03-08 DIAGNOSIS — Z95.810 IMPLANTABLE CARDIOVERTER-DEFIBRILLATOR (ICD) IN SITU: Chronic | ICD-10-CM

## 2021-03-08 DIAGNOSIS — I50.22 CHRONIC SYSTOLIC CHF (CONGESTIVE HEART FAILURE) (HCC): Chronic | ICD-10-CM

## 2021-03-08 DIAGNOSIS — Z87.74 TETRALOGY OF FALLOT S/P REPAIR: ICD-10-CM

## 2021-03-08 DIAGNOSIS — I50.22 CHRONIC SYSTOLIC HEART FAILURE (HCC): Primary | ICD-10-CM

## 2021-03-08 DIAGNOSIS — R00.1 BRADYCARDIA: ICD-10-CM

## 2021-03-08 DIAGNOSIS — Z95.810 ICD (IMPLANTABLE CARDIOVERTER-DEFIBRILLATOR) IN PLACE: ICD-10-CM

## 2021-03-08 PROCEDURE — 93284 PRGRMG EVAL IMPLANTABLE DFB: CPT | Performed by: INTERNAL MEDICINE

## 2021-03-08 PROCEDURE — 93290 INTERROG DEV EVAL ICPMS IP: CPT | Performed by: INTERNAL MEDICINE

## 2021-03-08 PROCEDURE — 99214 OFFICE O/P EST MOD 30 MIN: CPT | Performed by: CLINICAL NURSE SPECIALIST

## 2021-03-08 NOTE — PROGRESS NOTES
Patient comes in for programming evaluation for her defibrillator. All sensing and pacing parameters are within normal range. LV Threshold 2.75V / 0.40ms today. Battery life 4.4 years  AP 77.1%.  99.5%. No episodes noted. Patient remains on Coreg. No changes need to be made at this time. Please see interrogation for more detail. Optivol is within normal range. Patient will see NPRG today and follow up in 3 months in office or remotely.

## 2021-03-08 NOTE — PROGRESS NOTES
carvedilol (COREG) 12.5 MG tablet TAKE ONE TABLET BY MOUTH TWICE A DAY 10/15/20  Yes Malik Peters MD   Fluticasone furoate-vilanterol (BREO ELLIPTA) 200-25 MCG/INH AEPB inhaler INHALE ONE DOSE BY MOUTH DAILY **MUST CALL MD FOR APPOINTMENT 8/28/20  Yes Deja Ji MD   albuterol sulfate HFA (PROVENTIL HFA) 108 (90 Base) MCG/ACT inhaler Inhale 2 puffs into the lungs every 4 hours as needed for Wheezing or Shortness of Breath 8/26/20  Yes Deja Ji MD   tiotropium (SPIRIVA RESPIMAT) 2.5 MCG/ACT AERS inhaler Inhale 1 puff into the lungs daily 8/26/20  Yes Deja Ji MD   furosemide (LASIX) 20 MG tablet TAKE ONE TABLET BY MOUTH DAILY AND AN EXTRA DOSE AS NEEDED FOR SWELLING AND WEIGHT GAIN  Patient taking differently: Take 20 mg by mouth daily  1/22/20  Yes Malik Peters MD   Multiple Vitamins-Minerals (MULTIVITAMIN ADULT PO) Take by mouth   Yes Historical Provider, MD   losartan (COZAAR) 100 MG tablet Take 100 mg by mouth daily   Yes Historical Provider, MD   fluticasone (FLONASE) 50 MCG/ACT nasal spray 2 sprays by Nasal route daily  Patient taking differently: 2 sprays by Nasal route daily as needed  7/14/16  Yes Wilver Arriaga MD   BiPAP Machine MISC by Does not apply route   Yes Historical Provider, MD   metFORMIN (GLUCOPHAGE) 500 MG tablet Take 500 mg by mouth daily (with breakfast). Yes Historical Provider, MD   atorvastatin (LIPITOR) 20 MG tablet Take 20 mg by mouth daily. Yes Historical Provider, MD   aspirin EC 81 MG EC tablet Take 1 tablet by mouth daily. 7/15/13  Yes Erum Smith MD   acetaminophen (TYLENOL) 325 MG tablet Take 650 mg by mouth every 6 hours as needed. Yes Historical Provider, MD        Allergies:  Lisinopril     Review of Systems:   · Constitutional: there has been no unanticipated weight loss. There's been no change in energy level, sleep pattern, or activity level. · Eyes: No visual changes or diplopia. No scleral icterus.   · ENT: No Headaches, hearing loss or vertigo. No mouth sores or sore throat. · Cardiovascular: Reviewed in HPI  · Respiratory: No cough or wheezing, no sputum production. No hematemesis. · Gastrointestinal: No abdominal pain, appetite loss, blood in stools. No change in bowel or bladder habits. · Genitourinary: No dysuria, trouble voiding, or hematuria. · Musculoskeletal:  No gait disturbance, weakness or joint complaints. · Integumentary: No rash or pruritis. · Neurological: No headache, diplopia, change in muscle strength, numbness or tingling. No change in gait, balance, coordination, mood, affect, memory, mentation, behavior. · Psychiatric: No anxiety, no depression. · Endocrine: No malaise, fatigue or temperature intolerance. No excessive thirst, fluid intake, or urination. No tremor. · Hematologic/Lymphatic: No abnormal bruising or bleeding, blood clots or swollen lymph nodes. · Allergic/Immunologic: No nasal congestion or hives. Physical Examination:    Vitals:    03/08/21 1255   BP: 136/78   Site: Left Upper Arm   Position: Sitting   Cuff Size: Large Adult   Pulse: 82   SpO2: 94%   Weight: 230 lb (104.3 kg)   Height: 5' 6\" (1.676 m)        Constitutional and General Appearance: Warm and dry, no apparent distress, normal coloration  HEENT:  Normocephalic, atraumatic  Respiratory:  · Normal excursion and expansion without use of accessory muscles  · Resp Auscultation: Normal breath sounds without dullness  Cardiovascular:  · The apical impulses not displaced  · Heart tones are crisp and normal  · JVP normal cm H2O  · Regular rate and rhythm  · Peripheral pulses are symmetrical and full  · There is no clubbing, cyanosis of the extremities.   · no edema  · Pedal Pulses: 2+ and equal   Abdomen:  · No masses or tenderness  · Liver/Spleen: No Abnormalities Noted  Neurological/Psychiatric:  · Alert and oriented in all spheres  · Moves all extremities well  · Exhibits normal gait balance and coordination  · No abnormalities of mood, affect, memory, mentation, or behavior are noted    Lab Data:    CBC:   Lab Results   Component Value Date    WBC 9.3 03/06/2021    WBC 11.0 10/14/2020    WBC 9.1 08/25/2020    RBC 5.04 03/06/2021    RBC 4.88 10/14/2020    RBC 4.83 08/25/2020    HGB 14.9 03/06/2021    HGB 14.4 10/14/2020    HGB 14.5 08/25/2020    HCT 45.5 03/06/2021    HCT 44.1 10/14/2020    HCT 43.9 08/25/2020    MCV 90.2 03/06/2021    MCV 90.5 10/14/2020    MCV 90.8 08/25/2020    RDW 14.1 03/06/2021    RDW 14.1 10/14/2020    RDW 13.6 08/25/2020     03/06/2021     10/14/2020     08/25/2020     BMP:  Lab Results   Component Value Date     03/06/2021     10/14/2020     08/25/2020    K 4.5 03/06/2021    K 4.4 10/14/2020    K 4.5 08/25/2020     03/06/2021     10/14/2020     08/25/2020    CO2 29 03/06/2021    CO2 27 10/14/2020    CO2 24 08/25/2020    PHOS 3.6 05/01/2015    BUN 12 03/06/2021    BUN 13 10/14/2020    BUN 13 08/25/2020    CREATININE 1.0 03/06/2021    CREATININE 0.9 10/14/2020    CREATININE 0.9 08/25/2020     BNP:   Lab Results   Component Value Date    PROBNP 371 03/06/2021    PROBNP 671 08/25/2020    PROBNP 583 02/27/2020     Cardiac Imaging:  Echo 8/31/2020  Summary   HISTORY - TETROLOGY OF FALLOT WITH REPAIRS   *Left ventricle - normal size and thickness, indeterminate function due to   very poor endocardial definition   *Left atrium - dilated   *Right ventricle - dilated, indeterminate function due to poor endocardial   definition but appears reduced   *Pacer / ICD wire is visualized in the right ventricle. *Tricuspid valve - mild regurgitation with PASP of 56mmHg      Consider CORINA for better assessment of LV/RV function, wall motion, valvular   function    Echo 8-8-19  At [facility]   1. Tetralogy of Fallot      - s/p repair with non trans-annular patch infundibular resection (5/12/1963, [facility]). 2. Limited echocardiographic windows.  Unable to accurately quantify ventricular function. 3. Left ventricle is mildly dilated and the systolic function is low normal.   4. Right ventricle is borderline dilated and the systolic function is moderately diminished. 5. Paradoxical interventricular septum motion. 6. TR jet velocity estimates RV pressure to be ~33 mmHg + CVP. 7. Mild tricuspid valve regurgitation. 8. There is no obvious residual septal defect. 9. Dilated right atrium. 10. No right ventricular outflow tract obstruction. 11. There is no significant pericardial effusion. 12. Compared to the previous echocardiogram of 4/4/2016, there is no significant change. Review of the prior echocardiogram, the left ventricular systolic function appears unchanged, low-normal. However, functional assessment is extremely limited. Recommend cardiac MRI given limited acoustic windows.     ECHO 7/21/17-- Technically difficult study due to lung interface and limited windows. Patient has history of Tetralogy of Fallot repair with 2 Jazmin-Taussig shunt and atrial septal defect repair. Normal left ventricle size. There is mild concentric left ventricular hypertrophy.   Left ventricular function difficult to estimate due to poor endocardial  definition but appears reduced, likely severely reduced and comparable to  previous two echos. Abnormal septal motion. The left atrium is dilated.   There is trivial tricuspid regurgitation with RVSP estimated at 19 mmHg.     [facility]  TOF s/p repair with non trans-annular patch infundibular resection (May 12, 1963 [facility])  Limited echocardiographic windows. Unable to quantify ventricular function.   No residual ventricular septal defect  Mild tricuspid valve regurgitation  Right ventricle normal in size and systolic function moderately diminished  Paradoxical interventricular septum motion  No right ventricle outflow obstruction  Mild pulmonary valve regurgitation  Trivial mitral valve regurgitation  Left ventricle is mildly dilated and systolic function moderately diminished  No sign of pericardial effusion  Compared to previous echo right ventricle pressure slightly decreased     Echo(3/15) shows normal LV function. Assessment:    1. Chronic systolic heart failure (HCC) on arb and bb   2. SINTIA treated with BiPAP    3. Tetralogy of Fallot s/p repair    4. ICD (implantable cardioverter-defibrillator) in place        Plan:   Patient Instructions   1. Labs reviewed and copied for patient  2. Continue all current medications  3.   RTO in 6 months    I appreciate the opportunity of cooperating in the care of this individual.    VLADIMIR Guadalupe, 3/8/2021, 1:24 PM

## 2021-03-10 ENCOUNTER — OFFICE VISIT (OUTPATIENT)
Dept: PULMONOLOGY | Age: 66
End: 2021-03-10
Payer: COMMERCIAL

## 2021-03-10 VITALS — OXYGEN SATURATION: 93 % | HEART RATE: 78 BPM

## 2021-03-10 DIAGNOSIS — J44.9 COPD, SEVERE (HCC): Primary | Chronic | ICD-10-CM

## 2021-03-10 DIAGNOSIS — I42.9 PRIMARY CARDIOMYOPATHY (HCC): Chronic | ICD-10-CM

## 2021-03-10 DIAGNOSIS — J98.4 RESTRICTIVE LUNG DISEASE: ICD-10-CM

## 2021-03-10 DIAGNOSIS — J98.6 ELEVATED DIAPHRAGM: ICD-10-CM

## 2021-03-10 DIAGNOSIS — G47.33 OSA TREATED WITH BIPAP: Chronic | ICD-10-CM

## 2021-03-10 PROCEDURE — 99214 OFFICE O/P EST MOD 30 MIN: CPT | Performed by: INTERNAL MEDICINE

## 2021-03-10 NOTE — PROGRESS NOTES
[facility]  Progress Note  Valencia Langley MD       Yessenia Koenig   YOB: 1955    Date of Visit:  3/10/2021    Assessment/Plan:  1. COPD, severe (HCC)/SOB  PFT 10/15:  Spirometry reveals decreased FVC at 1.95 L, which is 60% predicted. FEV1 isndecreased at 1.27 L, which is 49% predicted. FEV1/FVC ratio is decreased at 66%. There is no significant change after inhaled bronchodilators. Lung volumes reveal decreased total lung capacity at 71% predicted. Vital capacity is decreased at 60% predicted. Diffusion capacity is decreased at 53% predicted. IMPRESSION: Combined severe obstructive and moderate restrictive lung  disease    Breo 200  Add Spiriva Respimat (Anoro made her hoarse)  Albuterol prn     Gradually worsening symptoms  Lifetime nonsmoker  Does not need LDCT chest    2. Elevated diaphragm  Chronic  Stable    3. SINTIA treated with BiPAP  BiPAP  Stable    4. Cardiomyopathy ([address]  EF 25%  Sees Dr Sanjuanita Lemus her pacer replaced. 5. Restrictive lung disease  2/2 obesity    Recommend COVID vaccine    FOLLOW UP: 6 months    HPI  The patient presents with a chief complaint of moderate to severe shortness of breath related to severe COPD of many years duration. She has mild associated cough. Exertion is a modifying factor. She also has  SINTIA. She uses BIPAP with good treatment effect. She also is using Breo. She feels her SOB is progressively worse. No Chest pain, Nausea or vomiting reported        Review of Systems  As documented in HPI     Allergies   Allergen Reactions    Lisinopril      Cough     Prior to Visit Medications    Medication Sig Taking?  Authorizing Provider   carvedilol (COREG) 12.5 MG tablet TAKE ONE TABLET BY MOUTH TWICE A DAY  Linda Coleman MD   Fluticasone furoate-vilanterol (BREO ELLIPTA) 200-25 MCG/INH AEPB inhaler INHALE ONE DOSE BY MOUTH DAILY **MUST CALL MD FOR APPOINTMENT  Valencia Langley MD   albuterol sulfate HFA (PROVENTIL HFA) 108 (90 Base) MCG/ACT inhaler Inhale 2 puffs into the lungs every 4 hours as needed for Wheezing or Shortness of Breath  Jana Zurita MD   tiotropium (SPIRIVA RESPIMAT) 2.5 MCG/ACT AERS inhaler Inhale 1 puff into the lungs daily  Jana Zurita MD   furosemide (LASIX) 20 MG tablet TAKE ONE TABLET BY MOUTH DAILY AND AN EXTRA DOSE AS NEEDED FOR SWELLING AND WEIGHT GAIN  Patient taking differently: Take 20 mg by mouth daily   Seema Milan MD   Multiple Vitamins-Minerals (MULTIVITAMIN ADULT PO) Take by mouth  Historical Provider, MD   losartan (COZAAR) 100 MG tablet Take 100 mg by mouth daily  Historical Provider, MD   fluticasone (FLONASE) 50 MCG/ACT nasal spray 2 sprays by Nasal route daily  Patient taking differently: 2 sprays by Nasal route daily as needed   Jyoti Gaytan MD   BiPAP Machine MISC by Does not apply route  Historical Provider, MD   metFORMIN (GLUCOPHAGE) 500 MG tablet Take 500 mg by mouth daily (with breakfast). Historical Provider, MD   atorvastatin (LIPITOR) 20 MG tablet Take 20 mg by mouth daily. Historical Provider, MD   aspirin EC 81 MG EC tablet Take 1 tablet by mouth daily. Dona Garcia MD   acetaminophen (TYLENOL) 325 MG tablet Take 650 mg by mouth every 6 hours as needed. Historical Provider, MD       Vitals:    03/10/21 1521   Pulse: 78   SpO2: 93%     There is no height or weight on file to calculate BMI. Wt Readings from Last 3 Encounters:   03/08/21 230 lb (104.3 kg)   01/12/21 239 lb 4.8 oz (108.5 kg)   10/14/20 228 lb (103.4 kg)     BP Readings from Last 3 Encounters:   03/08/21 136/78   01/12/21 (!) 90/56   10/14/20 128/87        Social History     Tobacco Use   Smoking Status Never Smoker   Smokeless Tobacco Never Used       Physical Exam:  Well developed, well nourished  Alert and oriented  Sclera is clear  No cervical adenopathy  No JVD. Chest examination is clear.   Cardiac examination reveals regular rate and rhythm without murmur, gallop or rub. The abdomen is soft, nontender and nondistended. There is no clubbing, cyanosis or edema of the extremities. There is no obvious skin rash.   No focal neuro deficicts  Normal mood and affect

## 2021-05-11 ENCOUNTER — TELEPHONE (OUTPATIENT)
Dept: PULMONOLOGY | Age: 66
End: 2021-05-11

## 2021-05-11 NOTE — TELEPHONE ENCOUNTER
Pt called and said she needs paperwork filled out (in folder)and a letter stating she has health problems    Call pt with questions [phone]

## 2021-05-26 ENCOUNTER — TELEPHONE (OUTPATIENT)
Dept: PULMONOLOGY | Age: 66
End: 2021-05-26

## 2021-05-26 NOTE — TELEPHONE ENCOUNTER
Pt called and said [facility] where she works has not yet received paperwork she needed us to fill out and closed the case again, and asked that we please fax that paperwork back after Dr. Lolita Daly signs them asap.      Call pt with questions at [phone]

## 2021-06-17 ENCOUNTER — TELEPHONE (OUTPATIENT)
Dept: CARDIOLOGY CLINIC | Age: 66
End: 2021-06-17

## 2021-06-17 NOTE — TELEPHONE ENCOUNTER
Pt states she is at her mothers and forgot about transmission. States she will send transmission tonight or tormorrow.

## 2021-06-21 ENCOUNTER — NURSE ONLY (OUTPATIENT)
Dept: CARDIOLOGY CLINIC | Age: 66
End: 2021-06-21
Payer: COMMERCIAL

## 2021-06-21 DIAGNOSIS — I42.8 NON-ISCHEMIC CARDIOMYOPATHY (HCC): ICD-10-CM

## 2021-06-21 DIAGNOSIS — I50.22 CHRONIC SYSTOLIC HEART FAILURE (HCC): ICD-10-CM

## 2021-06-21 DIAGNOSIS — Z95.810 CARDIAC DEFIBRILLATOR IN PLACE: ICD-10-CM

## 2021-06-21 PROCEDURE — 93296 REM INTERROG EVL PM/IDS: CPT | Performed by: INTERNAL MEDICINE

## 2021-06-21 PROCEDURE — 93297 REM INTERROG DEV EVAL ICPMS: CPT | Performed by: INTERNAL MEDICINE

## 2021-06-21 PROCEDURE — 93295 DEV INTERROG REMOTE 1/2/MLT: CPT | Performed by: INTERNAL MEDICINE

## 2021-06-21 NOTE — LETTER
[facility] [phone]  [address]   [address] [address] [phone]    Pacemaker/Defibrillator Clinic    06/21/21      Alfonso Odom  [place] [id number]      Dear Alfonso Odom    This letter is to inform you that we received the transmission from your monitor at home that checks your implanted heart device. The next date your monitor will automatically transmit will be 9-20-21. If your report needs attention we will notify you. Your device and monitor are wireless and most transmit cellularly, but please periodically check your monitor is still plugged in to the electrical outlet. If you still use the telephone land line to send please ensure the connection to the phone willie is secure. This will help to ensure successful automatic transmissions in the future. Also, the monitor needs to be close to you while sleeping at night. Please be aware that the remote device transmission sites are periodically monitored only during regular business hours during which simultaneous in-office device clinics are being run. If your transmission requires attention, we will contact you as soon as possible. **PLEASE NOTE** that our [facility] policy and processes are changing to ensure a more seamless approach for all parties involved, allowing more time for our nurses to address patient issues and concerns. We will no longer be sending letters for NORMAL remote transmissions. You will be contacted by phone if your transmission requires attention (as previously done), and letters will only be sent regarding monitor disconnections or missed transmissions if you are unable to be reached by phone. Please do not be alarmed by this new process, as we will continue to contact you if your transmission report requires attention. This will be your final \"remote received\" letter.   From this point forward, the [facility] will be utilizing the no news is good news approach. As always, please feel free to contact your nurse with any questions or concerns. Thank you.     [facility], [place], operated by [facility]

## 2021-07-30 ENCOUNTER — OFFICE VISIT (OUTPATIENT)
Dept: CARDIOLOGY CLINIC | Age: 66
End: 2021-07-30
Payer: COMMERCIAL

## 2021-07-30 ENCOUNTER — NURSE ONLY (OUTPATIENT)
Dept: CARDIOLOGY CLINIC | Age: 66
End: 2021-07-30
Payer: COMMERCIAL

## 2021-07-30 VITALS
HEIGHT: 66 IN | DIASTOLIC BLOOD PRESSURE: 64 MMHG | WEIGHT: 233.2 LBS | BODY MASS INDEX: 37.48 KG/M2 | OXYGEN SATURATION: 94 % | HEART RATE: 79 BPM | SYSTOLIC BLOOD PRESSURE: 112 MMHG

## 2021-07-30 DIAGNOSIS — Z95.810 IMPLANTABLE CARDIOVERTER-DEFIBRILLATOR (ICD) IN SITU: Chronic | ICD-10-CM

## 2021-07-30 DIAGNOSIS — G47.33 OSA TREATED WITH BIPAP: Chronic | ICD-10-CM

## 2021-07-30 DIAGNOSIS — R00.1 BRADYCARDIA: ICD-10-CM

## 2021-07-30 DIAGNOSIS — I42.8 NON-ISCHEMIC CARDIOMYOPATHY (HCC): ICD-10-CM

## 2021-07-30 DIAGNOSIS — I42.9 PRIMARY CARDIOMYOPATHY (HCC): Chronic | ICD-10-CM

## 2021-07-30 DIAGNOSIS — E66.9 NON MORBID OBESITY, UNSPECIFIED OBESITY TYPE: Chronic | ICD-10-CM

## 2021-07-30 DIAGNOSIS — I42.8 NON-ISCHEMIC CARDIOMYOPATHY (HCC): Primary | ICD-10-CM

## 2021-07-30 DIAGNOSIS — I50.22 CHRONIC SYSTOLIC CHF (CONGESTIVE HEART FAILURE) (HCC): Chronic | ICD-10-CM

## 2021-07-30 DIAGNOSIS — Z87.74 TETRALOGY OF FALLOT S/P REPAIR: Chronic | ICD-10-CM

## 2021-07-30 DIAGNOSIS — Z95.810 AUTOMATIC IMPLANTABLE CARDIOVERTER-DEFIBRILLATOR IN SITU: ICD-10-CM

## 2021-07-30 PROCEDURE — 93290 INTERROG DEV EVAL ICPMS IP: CPT | Performed by: INTERNAL MEDICINE

## 2021-07-30 PROCEDURE — 93284 PRGRMG EVAL IMPLANTABLE DFB: CPT | Performed by: INTERNAL MEDICINE

## 2021-07-30 PROCEDURE — 99214 OFFICE O/P EST MOD 30 MIN: CPT | Performed by: INTERNAL MEDICINE

## 2021-07-30 NOTE — PROGRESS NOTES
Cardiac Electrophysiology Follow Up   Date: 7/30/2021       Chief Complaint:   Chief Complaint   Patient presents with    1 Year Follow Up        HPI: Gurdeep Hernandez is a 74 y.o. patient with a history of f Tetrology of Fallot s/p repair in 1963 with a Jazmin-Taussig shunt and VSD howie patch. She had an AICD placed for cardiomyopathy with EF 25% in 2008. Device was dual chamber [facility] [place] [facility] with [place] lead. She is pacemaker dependant and is being V paced more than 99%. She underwent LHC/RHC at [address] [place] [address] on July 9, 2015. Coronaries were normal, and EF reported 35%.      Tetralogy of fallot repair 12/5/1963   Dr Bubba Mcgregor; Both Jazmin-Taussig shunts ligated. Extreme infundibular stenosis. Normal cors noted. Repeated aortic cross-clamps noted, none exceeding 12 mins. Transverse incision into RV. Infundibulum extensively resected from PA side as impossible to identify from below. Marked aortic dextroposition noted. Large VSD closed with howie patch. No TAP. Initially CHB.    \"The following is copied from [facility], Dr. Enedelia Guevara: \"Myla had surgical repair of tetralogy of Fallot at the of 15 years by Dr. Bubba Mcgregor at [facility]. These were in the early days of myocardial protection as well as in the early days of cardiopulmonary bypass. The surgery was notable for the absence of a trans annular patch, and also for a transverse rather short RV incision. The infundibulum was very tightly (3mm) stenosed and repeated bouts of aortic cross clamp were required to deal with this and the VSD closure. This clearly placed her myocardium at risk of ischemic injury. In 2008 she presented with heart block and an ejection fraction off 25%, and was fitted with an endovascular cardio defibrillator with AV sequential pacing ability. \"     She follows Dr. Xi Farrar for SINTIA and also nocturnal hypoxemia.      Due to heart failure and wide paced QRS on 8/26/2015 she had Biv upgrade of her or Shortness of Breath 8/26/20  Yes Michael Palmer MD   tiotropium (SPIRIVA RESPIMAT) 2.5 MCG/ACT AERS inhaler Inhale 1 puff into the lungs daily 8/26/20  Yes Michael Palmer MD   Multiple Vitamins-Minerals (MULTIVITAMIN ADULT PO) Take by mouth   Yes Historical Provider, MD   losartan (COZAAR) 100 MG tablet Take 100 mg by mouth daily   Yes Historical Provider, MD   fluticasone (FLONASE) 50 MCG/ACT nasal spray 2 sprays by Nasal route daily  Patient taking differently: 2 sprays by Nasal route daily as needed  7/14/16  Yes Gerardo Resendiz MD   BiPAP Machine MISC by Does not apply route   Yes Historical Provider, MD   metFORMIN (GLUCOPHAGE) 500 MG tablet Take 500 mg by mouth daily (with breakfast). Yes Historical Provider, MD   atorvastatin (LIPITOR) 20 MG tablet Take 20 mg by mouth daily. Yes Historical Provider, MD   aspirin EC 81 MG EC tablet Take 1 tablet by mouth daily. 7/15/13  Yes Frandy Fuller MD   acetaminophen (TYLENOL) 325 MG tablet Take 650 mg by mouth every 6 hours as needed. Yes Historical Provider, MD       Social History:   reports that she has never smoked. She has never used smokeless tobacco. She reports that she does not drink alcohol and does not use drugs. Family History:  family history includes Diabetes in her mother; Heart Disease in her father; High Blood Pressure in her mother; High Cholesterol in her mother. Reviewed.  Denies family history of sudden cardiac death, arrhythmia, premature CAD    Review of System:    · General ROS: negative for - chills, fever   · Psychological ROS: negative for - anxiety or depression  · Ophthalmic ROS: negative for - eye pain or loss of vision  · ENT ROS: negative for - epistaxis, headaches, nasal discharge, sore throat   · Allergy and Immunology ROS: negative for - hives, nasal congestion   · Hematological and Lymphatic ROS: negative for - bleeding problems, blood clots, bruising or jaundice  · Endocrine ROS: negative for - skin pathologic Q waves, ventricular pre-excitation, or QT prolongation. Studies:   1. Event monitor: None       2. Echo: 8/13/2020   Summary   HISTORY - TETROLOGY OF FALLOT WITH REPAIRS   *Left ventricle - normal size and thickness, indeterminate function due to   very poor endocardial definition   *Left atrium - dilated   *Right ventricle - dilated, indeterminate function due to poor endocardial   definition but appears reduced   *Pacer / ICD wire is visualized in the right ventricle. *Tricuspid valve - mild regurgitation with PASP of 56mmHg      Consider CORINA for better assessment of LV/RV function, wall motion, valvular   function    3. Stress Test:  None      4. [facility] HEMODYNAMICS: LVEDP: 15, LVEF:40%, RVEF30%  No gradient across the aortic valve, Gradient across the pulmonic valve:  peak gradient was 8 mmHg, mean gradient was 6 mmHg. Mild Transventicular patch leak, shunt was calculated to be not significant at 0.94. Moderate transpulmonic valve gradient. Narrowing of the pulmonic annulus, with no signficant stenosis of the pulmonic valve appreciated. I independently reviewed the ECG, MCOT, echocardiogram, stress test, and coronary angiography/PCI results and used them for my plan of care. Procedures:  1. Tetralogy of Fallot repair 12/5/1963   Dr Jm Babb; Both Jazmin-Taussig shunts ligated. Extreme infundibular stenosis. Normal cors noted. Repeated aortic cross-clamps noted, none exceeding 12 mins. Transverse incision into RV. Infundibulum extensively resected from PA side as impossible to identify from below. Marked aortic dextroposition noted. Large VSD closed with howie patch. No TAP. Initially CHB. Assessment/Plan:   NICM/LV dysfunction   -Prior EF of 35% reported by [facility] adult congenital heart    -S/p dual chamber AICD 2008/Gen change 10/2020   -The CIED was interrogated and programmed and I supervised and reviewed all the data.  All findings and changes are in device interrogation sheet and reflect my personal interpretation and changes and is scanned to Epic.    -3.7 years remaining, AP 62.2% ,  98.9%, changes made to LV lead pulse width increased to 9.4 which brings down V to 2.5. This increases the battery life to 5 yr, 7 mo. -Mode DDDR   -Optivol was elevated in May 2021, but now within alternating normal range. She's on lasix 20 mg daily.     -Followed routinely by the heart failure team     Tetralogy of Fallot   -S/p Repair 12/5/1963   -Specifics of procedure as reported above    HTN  -Controlled: 112/64  -BP goal <130/80  -Home BP monitoring encouraged, printed information provided on how to accurately measure BP at home.  -Counseled to follow a low salt diet to assure blood pressure remains controlled for cardiovascular risk factor modification.   -The patient is counseled to get regular exercise 3-5 times per week and maintain a healthy weight reduce cardiovascular risk factors. Obesity  Body mass index is 37.64 kg/m². -Excessive weight is complicating assessment and treatment. It is placing patient at risk for multiple co-morbidities as well as early death and contributing to the patient's presentation.  -Discussed weight management with diet and exercise      SINTIA  -Stable: Uses Bipap  -Encourage to use Bipap to prevent long term effects of untreated SINTIA      Plan:  -Continue medications  -Follow up Yearly and remotely every 3 months with the device clinic  -Continue follow ups with the Heart failure team     Thank you for allowing me to participate in the care of [facility]. All questions and concerns were addressed to the patient/family. Alternatives to my treatment were discussed. Scribe attestation: This note was scribed in the presence of Oziel Logan MD by Neeru Martines RN    Physician attestation: The scribe's documentation has been prepared under my direction and has been personally reviewed by me in its entirety. I confirm that the note above reflects all work, treatment, procedures, and medical decision making performed by me.      Gwen Burrell MD  Cardiac Electrophysiology  [facility]

## 2021-07-30 NOTE — PROGRESS NOTES
Patient comes in for programming evaluation for her defibrillator. All sensing and pacing parameters are within normal range. LV Threshold historically high and now stable @ 2.75V / 0.40ms since 3/8/2021. as a result of this and current parameters adaptive has voltage set @ 6V resulting in a low estimated battery live of 3.7y. Threshold retested using 1ms duration to find threshold at 1.5V.    parameters changed to [email or web address] with a maximum adaptive voltage change of 1V instead of 3V.  as such: Battery life 5y7m  changes discussed with Dr. Alicai Loernzo   RA 61.45%      RV: 98.86       CRTP 98.69%. No episodes noted. Patient remains on Coreg. Please see interrogation for more detail. Optivol is trending up and nearing episode threshold    Patient will see Dr. Alicia Lorenzo today and follow up in 3 months in office or remotely.

## 2021-08-31 ENCOUNTER — PATIENT MESSAGE (OUTPATIENT)
Dept: CARDIOLOGY CLINIC | Age: 66
End: 2021-08-31

## 2021-08-31 NOTE — TELEPHONE ENCOUNTER
Pt called stated she is of town in the [address] there is no signal there, she will be back in town Friday evening  9/3

## 2021-09-10 ENCOUNTER — OFFICE VISIT (OUTPATIENT)
Dept: PULMONOLOGY | Age: 66
End: 2021-09-10
Payer: COMMERCIAL

## 2021-09-10 VITALS — HEART RATE: 71 BPM | OXYGEN SATURATION: 93 %

## 2021-09-10 DIAGNOSIS — G47.33 OSA TREATED WITH BIPAP: Chronic | ICD-10-CM

## 2021-09-10 DIAGNOSIS — J98.6 ELEVATED DIAPHRAGM: ICD-10-CM

## 2021-09-10 DIAGNOSIS — I50.22 CHRONIC SYSTOLIC HEART FAILURE (HCC): ICD-10-CM

## 2021-09-10 DIAGNOSIS — R06.02 SOB (SHORTNESS OF BREATH): ICD-10-CM

## 2021-09-10 DIAGNOSIS — J98.4 RESTRICTIVE LUNG DISEASE: ICD-10-CM

## 2021-09-10 DIAGNOSIS — J44.9 COPD, SEVERE (HCC): Primary | Chronic | ICD-10-CM

## 2021-09-10 PROCEDURE — 90471 IMMUNIZATION ADMIN: CPT | Performed by: INTERNAL MEDICINE

## 2021-09-10 PROCEDURE — 90694 VACC AIIV4 NO PRSRV 0.5ML IM: CPT | Performed by: INTERNAL MEDICINE

## 2021-09-10 PROCEDURE — 99214 OFFICE O/P EST MOD 30 MIN: CPT | Performed by: INTERNAL MEDICINE

## 2021-09-10 RX ORDER — DOXYCYCLINE HYCLATE 100 MG/1
100 CAPSULE ORAL 2 TIMES DAILY
Qty: 20 CAPSULE | Refills: 0 | Status: SHIPPED | OUTPATIENT
Start: 2021-09-10 | End: 2021-09-20

## 2021-09-10 NOTE — PROGRESS NOTES
Vaccine Information Sheet, \"Influenza - Inactivated\"  given to Henry Avilez, or parent/legal guardian of  Henry Avilez and verbalized understanding. Patient responses:    Have you ever had a reaction to a flu vaccine? No  Do you have any current illness? No  Have you ever had Guillian [place] Syndrome? No  Do you have a serious allergy to any of the follow: Neomycin, Polymyxin, Thimerosal, eggs or egg products? No    Flu vaccine given per order. Please see immunization tab. Risks and benefits explained. Current VIS given.       Immunizations Administered     Name Date Dose Route    Influenza, Quadv, adjuvanted, 65 yrs +, IM, PF (Fluad) 9/10/2021 0.5 mL Intramuscular    Site: Deltoid- Left    Lot: [id number]    NDC: 07809-656-61

## 2021-09-10 NOTE — PROGRESS NOTES
[facility]  Progress Note  Stella Zamudio MD       Swati Vanegas   YOB: 1955    Date of Visit:  9/10/2021    Assessment/Plan:  1. COPD, severe (HCC)/SOB  PFT 10/15:  Spirometry reveals decreased FVC at 1.95 L, which is 60% predicted. FEV1 isndecreased at 1.27 L, which is 49% predicted. FEV1/FVC ratio is decreased at 66%. There is no significant change after inhaled bronchodilators. Lung volumes reveal decreased total lung capacity at 71% predicted. Vital capacity is decreased at 60% predicted. Diffusion capacity is decreased at 53% predicted. IMPRESSION: Combined severe obstructive and moderate restrictive lung  disease    Medication Management:  The patient benefits from the medical regimen and reports no complications. Breo 200  Add Spiriva Respimat (Anoro made her hoarse)  Albuterol prn  Also has productive cough with yellow sputum  Give her Doxycycline 100mg, 10 days     Gradually worsening symptoms  Had to stop several times walking to the office. SOB dressing at times. 2. Elevated diaphragm  Chronic  Stable    3. SINTIA treated with BiPAP  BiPAP  Stable    4. Cardiomyopathy ([address]  EF 25%  Sees Dr Briana Avalos her pacer replaced. 5. Restrictive lung disease  2/2 obesity    Recommend COVID vaccine  Flu shot given    FOLLOW UP: 6 months    HPI  The patient presents with a chief complaint of moderate to severe shortness of breath related to severe COPD of many years duration. She has increasing associated cough. She has some yellow sputum. Exertion is a modifying factor. She also has  SINTIA. She uses BIPAP with good treatment effect. She also is using Breo. She feels her SOB is progressively worse. No Chest pain, Nausea or vomiting reported        Review of Systems  As documented in HPI     Allergies   Allergen Reactions    Lisinopril      Cough     Prior to Visit Medications    Medication Sig Taking?  Authorizing Provider   furosemide (LASIX) 20 MG tablet TAKE ONE TABLET BY MOUTH DAILY AND AN EXTRA DOSE AS NEEDED FOR SWELLING AND WEIGHT GAIN  Alissa Gracia, APRN - CNS   carvedilol (COREG) 12.5 MG tablet TAKE ONE TABLET BY MOUTH TWICE A DAY  Alejandro Menchaca MD   Fluticasone furoate-vilanterol (BREO ELLIPTA) 200-25 MCG/INH AEPB inhaler INHALE ONE DOSE BY MOUTH DAILY **MUST CALL MD FOR APPOINTMENT  Michael Palmer MD   albuterol sulfate HFA (PROVENTIL HFA) 108 (90 Base) MCG/ACT inhaler Inhale 2 puffs into the lungs every 4 hours as needed for Wheezing or Shortness of Breath  Michael Palmer MD   tiotropium (SPIRIVA RESPIMAT) 2.5 MCG/ACT AERS inhaler Inhale 1 puff into the lungs daily  Michael Palmer MD   Multiple Vitamins-Minerals (MULTIVITAMIN ADULT PO) Take by mouth  Historical Provider, MD   losartan (COZAAR) 100 MG tablet Take 100 mg by mouth daily  Historical Provider, MD   fluticasone (FLONASE) 50 MCG/ACT nasal spray 2 sprays by Nasal route daily  Patient taking differently: 2 sprays by Nasal route daily as needed   Gerardo Resendiz MD   BiPAP Machine MISC by Does not apply route  Historical Provider, MD   metFORMIN (GLUCOPHAGE) 500 MG tablet Take 500 mg by mouth daily (with breakfast). Historical Provider, MD   atorvastatin (LIPITOR) 20 MG tablet Take 20 mg by mouth daily. Historical Provider, MD   aspirin EC 81 MG EC tablet Take 1 tablet by mouth daily. Frandy Fuller MD   acetaminophen (TYLENOL) 325 MG tablet Take 650 mg by mouth every 6 hours as needed. Historical Provider, MD       Vitals:    09/10/21 0946   Pulse: 71   SpO2: 93%     There is no height or weight on file to calculate BMI.      Wt Readings from Last 3 Encounters:   07/30/21 233 lb 3.2 oz (105.8 kg)   03/08/21 230 lb (104.3 kg)   01/12/21 239 lb 4.8 oz (108.5 kg)     BP Readings from Last 3 Encounters:   07/30/21 112/64   03/08/21 136/78   01/12/21 (!) 90/56        Social History     Tobacco Use   Smoking Status Never Smoker   Smokeless Tobacco Never Used       Physical Exam:  Well developed, well nourished  Alert and oriented  Sclera is clear  No cervical adenopathy  No JVD. Chest examination is clear. Cardiac examination reveals regular rate and rhythm without murmur, gallop or rub. The abdomen is soft, nontender and nondistended. There is no clubbing, cyanosis or edema of the extremities. There is no obvious skin rash.   No focal neuro deficicts  Normal mood and affect

## 2021-09-17 ENCOUNTER — OFFICE VISIT (OUTPATIENT)
Dept: CARDIOLOGY CLINIC | Age: 66
End: 2021-09-17
Payer: COMMERCIAL

## 2021-09-17 VITALS
WEIGHT: 236 LBS | SYSTOLIC BLOOD PRESSURE: 120 MMHG | BODY MASS INDEX: 37.93 KG/M2 | HEART RATE: 76 BPM | OXYGEN SATURATION: 92 % | HEIGHT: 66 IN | DIASTOLIC BLOOD PRESSURE: 82 MMHG

## 2021-09-17 DIAGNOSIS — Z95.810 ICD (IMPLANTABLE CARDIOVERTER-DEFIBRILLATOR) IN PLACE: ICD-10-CM

## 2021-09-17 DIAGNOSIS — I50.22 CHRONIC SYSTOLIC CHF (CONGESTIVE HEART FAILURE) (HCC): Primary | ICD-10-CM

## 2021-09-17 DIAGNOSIS — G47.33 OSA TREATED WITH BIPAP: ICD-10-CM

## 2021-09-17 DIAGNOSIS — Z87.74 TETRALOGY OF FALLOT S/P REPAIR: ICD-10-CM

## 2021-09-17 PROCEDURE — 99214 OFFICE O/P EST MOD 30 MIN: CPT | Performed by: CLINICAL NURSE SPECIALIST

## 2021-09-17 NOTE — PATIENT INSTRUCTIONS
1.  Increase lasix to 40 mg until you loose 3-4 pounds  2. Continue all other current medications  3. Check blood work at primary care appt  4.   RTO in 6 months

## 2021-09-17 NOTE — PROGRESS NOTES
[facility] [place]  Progress Note    Primary Care Doctor:  Jens Patel MD    Chief Complaint   Patient presents with    6 Month Follow-Up     Optivol    Congestive Heart Failure    Shortness of Breath        History of Present Illness:  74 y.o. female with history of tetrology of fallot at age of 6 in 80, BiV ICD, LVEF 25% to 45% (Dr Wilian Martin note), follows Dr Aurora Weber COPD. She follows at [address] [place] [address] Dr every 2 years with Dr Edwena Simmonds. SINTIA on bipap   Works for [employer]    I had the pleasure of seeing Lisa Ward in follow up for tetrology of fallot and sHF. She is ambulatory and by her self. Her optival shows increasing thoracic impedence. She had increased impedence in May/June as well. Her weight is up 3-4 pounds. She had an echo done at [facility] in August.  She continues to teach nutrition remotely. She denies any increased shortness of breath, palpitations, lightheadedness or edema. She did take an extra lasix last week twice. She is wearing her cpap    Past Medical History:   has a past medical history of Bradycardia, Hyperlipidemia, Hypertension, SINTIA treated with BiPAP, Stroke, hemorrhagic ([address] and Type II or unspecified type diabetes mellitus without mention of complication, not stated as uncontrolled. Surgical History:   has a past surgical history that includes Cardiac defibrillator placement; Ovary removal; Cardiac catheterization (5/2015); Cholecystectomy; Cardiac defibrillator placement (8/26/15); and Cardiac defibrillator placement (Left, 10/2020). Social History:   reports that she has never smoked. She has never used smokeless tobacco. She reports that she does not drink alcohol and does not use drugs.    Family History:   Family History   Problem Relation Age of Onset    Diabetes Mother     High Cholesterol Mother     High Blood Pressure Mother     Heart Disease Father        Home Medications:  Prior to Admission medications    Medication Sig Start change in energy level, sleep pattern, or activity level. · Eyes: No visual changes or diplopia. No scleral icterus. · ENT: No Headaches, hearing loss or vertigo. No mouth sores or sore throat. · Cardiovascular: Reviewed in HPI  · Respiratory: No cough or wheezing, no sputum production. No hematemesis. · Gastrointestinal: No abdominal pain, appetite loss, blood in stools. No change in bowel or bladder habits. · Genitourinary: No dysuria, trouble voiding, or hematuria. · Musculoskeletal:  No gait disturbance, weakness or joint complaints. · Integumentary: No rash or pruritis. · Neurological: No headache, diplopia, change in muscle strength, numbness or tingling. No change in gait, balance, coordination, mood, affect, memory, mentation, behavior. · Psychiatric: No anxiety, no depression. · Endocrine: No malaise, fatigue or temperature intolerance. No excessive thirst, fluid intake, or urination. No tremor. · Hematologic/Lymphatic: No abnormal bruising or bleeding, blood clots or swollen lymph nodes. · Allergic/Immunologic: No nasal congestion or hives. Physical Examination:    Vitals:    09/17/21 0829   BP: 120/82   Site: Left Upper Arm   Position: Sitting   Cuff Size: Large Adult   Pulse: 76   SpO2: 92%   Weight: 236 lb (107 kg)   Height: 5' 6\" (1.676 m)        Constitutional and General Appearance: Warm and dry, no apparent distress, normal coloration  HEENT:  Normocephalic, atraumatic  Respiratory:  · Normal excursion and expansion without use of accessory muscles  · Resp Auscultation: Normal breath sounds without dullness  Cardiovascular:  · The apical impulses not displaced  · Heart tones are crisp and normal  · JVP normal cm H2O  · Regular rate and rhythm  · Peripheral pulses are symmetrical and full  · There is no clubbing, cyanosis of the extremities.   · no edema  · Pedal Pulses: 2+ and equal   Abdomen:  · No masses or tenderness  · Liver/Spleen: No Abnormalities Noted  Neurological/Psychiatric:  · Alert and oriented in all spheres  · Moves all extremities well  · Exhibits normal gait balance and coordination  · No abnormalities of mood, affect, memory, mentation, or behavior are noted    Lab Data:    CBC:   Lab Results   Component Value Date    WBC 9.3 03/06/2021    WBC 11.0 10/14/2020    WBC 9.1 08/25/2020    RBC 5.04 03/06/2021    RBC 4.88 10/14/2020    RBC 4.83 08/25/2020    HGB 14.9 03/06/2021    HGB 14.4 10/14/2020    HGB 14.5 08/25/2020    HCT 45.5 03/06/2021    HCT 44.1 10/14/2020    HCT 43.9 08/25/2020    MCV 90.2 03/06/2021    MCV 90.5 10/14/2020    MCV 90.8 08/25/2020    RDW 14.1 03/06/2021    RDW 14.1 10/14/2020    RDW 13.6 08/25/2020     03/06/2021     10/14/2020     08/25/2020     BMP:  Lab Results   Component Value Date     03/06/2021     10/14/2020     08/25/2020    K 4.5 03/06/2021    K 4.4 10/14/2020    K 4.5 08/25/2020     03/06/2021     10/14/2020     08/25/2020    CO2 29 03/06/2021    CO2 27 10/14/2020    CO2 24 08/25/2020    PHOS 3.6 05/01/2015    BUN 12 03/06/2021    BUN 13 10/14/2020    BUN 13 08/25/2020    CREATININE 1.0 03/06/2021    CREATININE 0.9 10/14/2020    CREATININE 0.9 08/25/2020     BNP:   Lab Results   Component Value Date    PROBNP 371 03/06/2021    PROBNP 671 08/25/2020    PROBNP 583 02/27/2020     Cardiac Imaging:  Echo at [facility] 8/10/2021  1. Tetralogy of Fallot       - s/p repair with non trans-annular patch infundibular resection (5/12/1963, [place]).    2. Limited echocardiographic windows. Unable to accurately quantify ventricular function.  Recommend cross-sectional imaging.    3. Paradoxical interventricular septum motion.    4. Mild tricuspid valve regurgitation.    5. Dilated right atrium.    6. Right ventricle is borderline dilated and the systolic function is moderately diminished.    7. Left ventricle is mildly dilated and the systolic function is qualitatively low normal.    8. Echogenic density present in the innominate vein with turbulent flow which may be associated with lead.    9. There is no significant pericardial effusion. 10. Compared to the previous echocardiogram of 8/8/2019, there is no significant change. Echo 8/31/2020  Summary   HISTORY - TETROLOGY OF FALLOT WITH REPAIRS   *Left ventricle - normal size and thickness, indeterminate function due to   very poor endocardial definition   *Left atrium - dilated   *Right ventricle - dilated, indeterminate function due to poor endocardial   definition but appears reduced   *Pacer / ICD wire is visualized in the right ventricle. *Tricuspid valve - mild regurgitation with PASP of 56mmHg      Consider CORINA for better assessment of LV/RV function, wall motion, valvular   function    Echo 8-8-19  At [facility]   1. Tetralogy of Fallot      - s/p repair with non trans-annular patch infundibular resection (5/12/1963, [place]). 2. Limited echocardiographic windows. Unable to accurately quantify ventricular function. 3. Left ventricle is mildly dilated and the systolic function is low normal.   4. Right ventricle is borderline dilated and the systolic function is moderately diminished. 5. Paradoxical interventricular septum motion. 6. TR jet velocity estimates RV pressure to be ~33 mmHg + CVP. 7. Mild tricuspid valve regurgitation. 8. There is no obvious residual septal defect. 9. Dilated right atrium. 10. No right ventricular outflow tract obstruction. 11. There is no significant pericardial effusion. 12. Compared to the previous echocardiogram of 4/4/2016, there is no significant change. Review of the prior echocardiogram, the left ventricular systolic function appears unchanged, low-normal. However, functional assessment is extremely limited. Recommend cardiac MRI given limited acoustic windows.     ECHO 7/21/17-- Technically difficult study due to lung interface and limited windows. Patient has history of Tetralogy of Fallot repair with 2 Jazmin-Taussig shunt and atrial septal defect repair. Normal left ventricle size. There is mild concentric left ventricular hypertrophy.   Left ventricular function difficult to estimate due to poor endocardial  definition but appears reduced, likely severely reduced and comparable to  previous two echos. Abnormal septal motion. The left atrium is dilated.   There is trivial tricuspid regurgitation with RVSP estimated at 19 mmHg.     [facility]  TOF s/p repair with non trans-annular patch infundibular resection (May 12, 1963 [facility])  Limited echocardiographic windows. Unable to quantify ventricular function. No residual ventricular septal defect  Mild tricuspid valve regurgitation  Right ventricle normal in size and systolic function moderately diminished  Paradoxical interventricular septum motion  No right ventricle outflow obstruction  Mild pulmonary valve regurgitation  Trivial mitral valve regurgitation  Left ventricle is mildly dilated and systolic function moderately diminished  No sign of pericardial effusion  Compared to previous echo right ventricle pressure slightly decreased     Echo(3/15) shows normal LV function. Assessment:    1. Chronic systolic heart failure (HCC) on arb and bb   2. SINTIA treated with BiPAP    3. Tetralogy of Fallot s/p repair    4. ICD (implantable cardioverter-defibrillator) in place        Plan:   Patient Instructions   1. Increase lasix to 40 mg until you loose 3-4 pounds  2. Continue all other current medications  3. Check blood work at primary care appt  4.   RTO in 6 months      I appreciate the opportunity of cooperating in the care of this individual.    JEYSON Mendez, CNS, 9/17/2021, 10:11 AM

## 2021-09-20 ENCOUNTER — NURSE ONLY (OUTPATIENT)
Dept: CARDIOLOGY CLINIC | Age: 66
End: 2021-09-20
Payer: COMMERCIAL

## 2021-09-20 DIAGNOSIS — I42.8 NON-ISCHEMIC CARDIOMYOPATHY (HCC): ICD-10-CM

## 2021-09-20 DIAGNOSIS — I50.22 CHRONIC SYSTOLIC HEART FAILURE (HCC): ICD-10-CM

## 2021-09-20 DIAGNOSIS — Z95.810 CARDIAC DEFIBRILLATOR IN PLACE: ICD-10-CM

## 2021-09-20 PROCEDURE — 93296 REM INTERROG EVL PM/IDS: CPT | Performed by: INTERNAL MEDICINE

## 2021-09-20 PROCEDURE — 93295 DEV INTERROG REMOTE 1/2/MLT: CPT | Performed by: INTERNAL MEDICINE

## 2021-09-20 PROCEDURE — 93297 REM INTERROG DEV EVAL ICPMS: CPT | Performed by: INTERNAL MEDICINE

## 2021-09-20 NOTE — PROGRESS NOTES
We received remote transmission from patient's monitor at home. Transmission shows normal sensing and pacing function. LV threshold is historically elevated. Pt has low bi-v pacing. EP physician will review. See interrogation under cardiology tab in the [address] [place] [address] [address] field for more details. Optivol is within normal range.

## 2021-10-05 ENCOUNTER — TELEPHONE (OUTPATIENT)
Dept: PULMONOLOGY | Age: 66
End: 2021-10-05

## 2021-10-05 RX ORDER — DOXYCYCLINE HYCLATE 100 MG
100 TABLET ORAL DAILY
Qty: 10 TABLET | Refills: 0 | Status: SHIPPED | OUTPATIENT
Start: 2021-10-05 | End: 2021-10-15

## 2021-10-05 NOTE — TELEPHONE ENCOUNTER
Pt left voice mail saying she is coughing up yellow phlegm and would like an antibiotic called in.      # [phone]

## 2021-10-27 NOTE — TELEPHONE ENCOUNTER
Pt needs 2 prescriptions for handicapped tag. Call her with directions on how to received or .     Ph # [phone] Detail Level: Detailed Quality 111:Pneumonia Vaccination Status For Older Adults: Pneumococcal Vaccination Previously Received Quality 110: Preventive Care And Screening: Influenza Immunization: Influenza Immunization Administered during Influenza season

## 2021-12-20 ENCOUNTER — NURSE ONLY (OUTPATIENT)
Dept: CARDIOLOGY CLINIC | Age: 66
End: 2021-12-20
Payer: COMMERCIAL

## 2021-12-20 DIAGNOSIS — I50.22 CHRONIC SYSTOLIC HEART FAILURE (HCC): ICD-10-CM

## 2021-12-20 DIAGNOSIS — Z95.810 CARDIAC DEFIBRILLATOR IN PLACE: ICD-10-CM

## 2021-12-20 DIAGNOSIS — I42.8 NON-ISCHEMIC CARDIOMYOPATHY (HCC): ICD-10-CM

## 2021-12-20 PROCEDURE — 93297 REM INTERROG DEV EVAL ICPMS: CPT | Performed by: INTERNAL MEDICINE

## 2021-12-20 PROCEDURE — 93295 DEV INTERROG REMOTE 1/2/MLT: CPT | Performed by: INTERNAL MEDICINE

## 2021-12-20 PROCEDURE — 93296 REM INTERROG EVL PM/IDS: CPT | Performed by: INTERNAL MEDICINE

## 2021-12-20 NOTE — PROGRESS NOTES
We received remote transmission from patient's monitor at home. Transmission shows normal sensing and pacing function. LV threshold is historically elevated. Pt has low bi-v pacing. EP physician will review. See interrogation under cardiology tab in the [address] [place] [address] [address] field for more details. Optivol is within normal range.

## 2021-12-21 RX ORDER — FUROSEMIDE 20 MG/1
TABLET ORAL
Qty: 100 TABLET | Refills: 1 | Status: SHIPPED | OUTPATIENT
Start: 2021-12-21 | End: 2022-04-04

## 2021-12-21 NOTE — TELEPHONE ENCOUNTER
Prescription refill    Last OV:09/17/2021    Last Refill:03/26/2021    Labs:03/06/2021    Future Appt:none scheduled, patient is on the recall list

## 2022-01-17 ENCOUNTER — TELEPHONE (OUTPATIENT)
Dept: PULMONOLOGY | Age: 67
End: 2022-01-17

## 2022-01-17 RX ORDER — DOXYCYCLINE HYCLATE 100 MG
100 TABLET ORAL 2 TIMES DAILY
Qty: 20 TABLET | Refills: 0 | Status: SHIPPED | OUTPATIENT
Start: 2022-01-17 | End: 2022-01-27

## 2022-03-04 ENCOUNTER — OFFICE VISIT (OUTPATIENT)
Dept: PULMONOLOGY | Age: 67
End: 2022-03-04
Payer: COMMERCIAL

## 2022-03-04 VITALS — OXYGEN SATURATION: 94 % | HEART RATE: 76 BPM

## 2022-03-04 DIAGNOSIS — G47.33 OSA TREATED WITH BIPAP: Chronic | ICD-10-CM

## 2022-03-04 DIAGNOSIS — J44.9 COPD, SEVERE (HCC): Primary | Chronic | ICD-10-CM

## 2022-03-04 DIAGNOSIS — I42.9 PRIMARY CARDIOMYOPATHY (HCC): Chronic | ICD-10-CM

## 2022-03-04 DIAGNOSIS — J98.4 RESTRICTIVE LUNG DISEASE: ICD-10-CM

## 2022-03-04 DIAGNOSIS — J98.6 ELEVATED DIAPHRAGM: ICD-10-CM

## 2022-03-04 PROCEDURE — 99214 OFFICE O/P EST MOD 30 MIN: CPT | Performed by: INTERNAL MEDICINE

## 2022-03-04 RX ORDER — DOXYCYCLINE HYCLATE 100 MG/1
CAPSULE ORAL
Qty: 20 CAPSULE | Refills: 5 | Status: SHIPPED | OUTPATIENT
Start: 2022-03-04 | End: 2022-10-18

## 2022-03-04 NOTE — PROGRESS NOTES
[facility]  Progress Note  Trenton Cantrell MD       James Mohr   YOB: 1955    Date of Visit:  3/4/2022    Assessment/Plan:  1. COPD, severe (HCC)/SOB  PFT 10/15:  Spirometry reveals decreased FVC at 1.95 L, which is 60% predicted. FEV1 isndecreased at 1.27 L, which is 49% predicted. FEV1/FVC ratio is decreased at 66%. There is no significant change after inhaled bronchodilators. Lung volumes reveal decreased total lung capacity at 71% predicted. Vital capacity is decreased at 60% predicted. Diffusion capacity is decreased at 53% predicted. IMPRESSION: Combined severe obstructive and moderate restrictive lung  disease    Medication Management:  The patient benefits from the medical regimen and reports no complications. Breo 200  Add Spiriva Respimat (Anoro made her hoarse)  Albuterol prn  Also has productive cough with yellow sputum  Give her Doxycycline 100mg, 10 days  Then have her take 100 mg MWF. Seems she is only good for a week or two after stopping Abx. Gradually worsening symptoms  Had to stop several times walking to the office. SOB dressing at times. 2. Elevated diaphragm  Chronic  Stable    3. SINTIA treated with BiPAP  BiPAP  Stable    4. Cardiomyopathy ([address]  EF 25%  Sees Dr Gonsalo Alatorre her pacer replaced. 5. Restrictive lung disease  2/2 obesity    Recommend COVID vaccine  Flu shot given    FOLLOW UP: 6 months    HPI  The patient presents with a chief complaint of moderate to severe shortness of breath related to severe COPD of many years duration. She has increasing associated cough. She has some yellow sputum. Exertion is a modifying factor. She also has  SINTIA. She uses BIPAP with good treatment effect. She also is using Breo. Review of Systems  No Chest pain, Nausea or vomiting reported      Allergies   Allergen Reactions    Lisinopril      Cough     Prior to Visit Medications    Medication Sig Taking? Authorizing Provider   furosemide (LASIX) 20 MG tablet TAKE ONE TABLET BY MOUTH DAILY AND TAKE AN EXTRA DOSE AS NEEDED FOR SWELLING AND WEIGHT GAIN  JEYSON Sanchez   Fluticasone furoate-vilanterol (BREO ELLIPTA) 200-25 MCG/INH AEPB inhaler INHALE ONE DOSE BY MOUTH DAILY  Ovidio Farrar MD   SPIRIVA RESPIMAT 2.5 MCG/ACT AERS inhaler INHALE TWO PUFFS BY MOUTH DAILY  Ovidio Farrar MD   carvedilol (COREG) 12.5 MG tablet TAKE ONE TABLET BY MOUTH TWICE A DAY  JEYSON Sanchez   albuterol sulfate HFA (PROVENTIL HFA) 108 (90 Base) MCG/ACT inhaler Inhale 2 puffs into the lungs every 4 hours as needed for Wheezing or Shortness of Breath  Ovidio Farrar MD   Multiple Vitamins-Minerals (MULTIVITAMIN ADULT PO) Take by mouth  Historical Provider, MD   losartan (COZAAR) 100 MG tablet Take 100 mg by mouth daily  Historical Provider, MD   fluticasone (FLONASE) 50 MCG/ACT nasal spray 2 sprays by Nasal route daily  Patient taking differently: 2 sprays by Nasal route daily as needed   Konnie Landau, MD   BiPAP Machine MISC by Does not apply route  Historical Provider, MD   metFORMIN (GLUCOPHAGE) 500 MG tablet Take 500 mg by mouth daily (with breakfast). Historical Provider, MD   atorvastatin (LIPITOR) 20 MG tablet Take 20 mg by mouth daily. Historical Provider, MD   aspirin EC 81 MG EC tablet Take 1 tablet by mouth daily. Liban Alvarado MD   acetaminophen (TYLENOL) 325 MG tablet Take 650 mg by mouth every 6 hours as needed. Historical Provider, MD       Vitals:    03/04/22 1516   Pulse: 76   SpO2: 94%     There is no height or weight on file to calculate BMI.      Wt Readings from Last 3 Encounters:   09/17/21 236 lb (107 kg)   07/30/21 233 lb 3.2 oz (105.8 kg)   03/08/21 230 lb (104.3 kg)     BP Readings from Last 3 Encounters:   09/17/21 120/82   07/30/21 112/64   03/08/21 136/78        Social History     Tobacco Use   Smoking Status Never Smoker   Smokeless Tobacco Never Used Physical Exam:  Well developed, well nourished  Alert and oriented  Sclera is clear  No cervical adenopathy  No JVD. Chest examination is clear. Cardiac examination reveals regular rate and rhythm without murmur, gallop or rub. The abdomen is soft, nontender and nondistended. There is no clubbing, cyanosis or edema of the extremities. There is no obvious skin rash.   No focal neuro deficicts  Normal mood and affect

## 2022-03-11 DIAGNOSIS — I50.22 CHRONIC SYSTOLIC CHF (CONGESTIVE HEART FAILURE) (HCC): ICD-10-CM

## 2022-03-11 LAB
ANION GAP SERPL CALCULATED.3IONS-SCNC: 16 MMOL/L (ref 3–16)
BUN BLDV-MCNC: 16 MG/DL (ref 7–20)
CALCIUM SERPL-MCNC: 9.7 MG/DL (ref 8.3–10.6)
CHLORIDE BLD-SCNC: 101 MMOL/L (ref 99–110)
CO2: 26 MMOL/L (ref 21–32)
CREAT SERPL-MCNC: 1 MG/DL (ref 0.6–1.2)
GFR AFRICAN AMERICAN: >60
GFR NON-AFRICAN AMERICAN: 55
GLUCOSE BLD-MCNC: 132 MG/DL (ref 70–99)
POTASSIUM SERPL-SCNC: 4.6 MMOL/L (ref 3.5–5.1)
PRO-BNP: 670 PG/ML (ref 0–124)
SODIUM BLD-SCNC: 143 MMOL/L (ref 136–145)

## 2022-03-14 ENCOUNTER — OFFICE VISIT (OUTPATIENT)
Dept: CARDIOLOGY CLINIC | Age: 67
End: 2022-03-14
Payer: COMMERCIAL

## 2022-03-14 VITALS
HEART RATE: 67 BPM | OXYGEN SATURATION: 97 % | DIASTOLIC BLOOD PRESSURE: 72 MMHG | WEIGHT: 225.5 LBS | SYSTOLIC BLOOD PRESSURE: 114 MMHG | BODY MASS INDEX: 36.24 KG/M2 | HEIGHT: 66 IN

## 2022-03-14 DIAGNOSIS — Z95.810 ICD (IMPLANTABLE CARDIOVERTER-DEFIBRILLATOR) IN PLACE: ICD-10-CM

## 2022-03-14 DIAGNOSIS — I50.22 CHRONIC SYSTOLIC HEART FAILURE (HCC): Primary | ICD-10-CM

## 2022-03-14 DIAGNOSIS — G47.33 OSA TREATED WITH BIPAP: ICD-10-CM

## 2022-03-14 DIAGNOSIS — Z87.74 S/P TOF (TETRALOGY OF FALLOT) REPAIR: ICD-10-CM

## 2022-03-14 PROCEDURE — 99214 OFFICE O/P EST MOD 30 MIN: CPT | Performed by: CLINICAL NURSE SPECIALIST

## 2022-03-14 NOTE — PROGRESS NOTES
[facility]  Progress Note    Primary Care Doctor:  Rodney Renee MD    Chief Complaint   Patient presents with    Congestive Heart Failure    Bradycardia    6 Month Follow-Up     no cardio symptoms        History of Present Illness:  74 y.o. 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 She follows at [address] [place] [address] Dr every 2 years with Dr Juliana Andrade. SINTIA on bipap   Works for [employer]    I had the pleasure of seeing Joleen Velazquez in follow up for tetrology of fallot and sHF. She is ambulatory and by her self. Her optival shows normal thoracic impedence. Her weight is down from 236->225. She was treated twice for URI with antibiotics and now is on doxycycline MWF per pulmonary. She continues with shortness of breath when walking short distances. 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 She is taking lasix 40 mg once a day. Echo in 8/21    Past Medical History:   has a past medical history of Bradycardia, Hyperlipidemia, Hypertension, SINTIA treated with BiPAP, Stroke, hemorrhagic ([address] and Type II or unspecified type diabetes mellitus without mention of complication, not stated as uncontrolled. Surgical History:   has a past surgical history that includes Cardiac defibrillator placement; Ovary removal; Cardiac catheterization (5/2015); Cholecystectomy; Cardiac defibrillator placement (8/26/15); and Cardiac defibrillator placement (Left, 10/2020). Social History:   reports that she has never smoked. She has never used smokeless tobacco. She reports that she does not drink alcohol and does not use drugs.    Family History:   Family History   Problem Relation Age of Onset    Diabetes Mother     High Cholesterol Mother     High Blood Pressure Mother     Heart Disease Father        Home Medications:  Prior to Admission medications    Medication Sig Start Date End Date Taking? Authorizing Provider   doxycycline hyclate (VIBRAMYCIN) 100 MG capsule Take one per day for 10 days and then take one every M-W-F after that. 3/4/22  Yes Shelly May MD   furosemide (LASIX) 20 MG tablet TAKE ONE TABLET BY MOUTH DAILY AND TAKE AN EXTRA DOSE AS NEEDED FOR SWELLING AND WEIGHT GAIN  Patient taking differently: 40 mg  12/21/21  Yes JEYSON Smith - CNS   Fluticasone furoate-vilanterol (BREO ELLIPTA) 200-25 MCG/INH AEPB inhaler INHALE ONE DOSE BY MOUTH DAILY 12/17/21  Yes Shelly May MD   SPIRIVA RESPIMAT 2.5 MCG/ACT AERS inhaler INHALE TWO PUFFS BY MOUTH DAILY 12/17/21  Yes Shelly May MD   carvedilol (COREG) 12.5 MG tablet TAKE ONE TABLET BY MOUTH TWICE A DAY 12/15/21  Yes JEYSON Sanchez - CNS   albuterol sulfate HFA (PROVENTIL HFA) 108 (90 Base) MCG/ACT inhaler Inhale 2 puffs into the lungs every 4 hours as needed for Wheezing or Shortness of Breath 8/26/20  Yes Shelly May MD   Multiple Vitamins-Minerals (MULTIVITAMIN ADULT PO) Take by mouth   Yes Historical Provider, MD   losartan (COZAAR) 100 MG tablet Take 100 mg by mouth daily   Yes Historical Provider, MD   fluticasone (FLONASE) 50 MCG/ACT nasal spray 2 sprays by Nasal route daily  Patient taking differently: 2 sprays by Nasal route daily as needed  7/14/16  Yes Luis Carrasco MD   BiPAP Machine MISC by Does not apply route   Yes Historical Provider, MD   metFORMIN (GLUCOPHAGE) 500 MG tablet Take 500 mg by mouth 2 times daily (with meals)    Yes Historical Provider, MD   atorvastatin (LIPITOR) 20 MG tablet Take 20 mg by mouth daily. Yes Historical Provider, MD   aspirin EC 81 MG EC tablet Take 1 tablet by mouth daily. 7/15/13  Yes Addis Bowman MD   acetaminophen (TYLENOL) 325 MG tablet Take 650 mg by mouth every 6 hours as needed. Yes Historical Provider, MD        Allergies:  Lisinopril     Review of Systems:   · Constitutional: there has been no unanticipated weight loss. There's been no change in energy level, sleep pattern, or activity level. · Eyes: No visual changes or diplopia. No scleral icterus. · ENT: No Headaches, hearing loss or vertigo. No mouth sores or sore throat. · Cardiovascular: Reviewed in HPI  · Respiratory: No cough or wheezing, no sputum production. No hematemesis. · Gastrointestinal: No abdominal pain, appetite loss, blood in stools. No change in bowel or bladder habits. · Genitourinary: No dysuria, trouble voiding, or hematuria. · Musculoskeletal:  No gait disturbance, weakness or joint complaints. · Integumentary: No rash or pruritis. · Neurological: No headache, diplopia, change in muscle strength, numbness or tingling. No change in gait, balance, coordination, mood, affect, memory, mentation, behavior. · Psychiatric: No anxiety, no depression. · Endocrine: No malaise, fatigue or temperature intolerance. No excessive thirst, fluid intake, or urination. No tremor. · Hematologic/Lymphatic: No abnormal bruising or bleeding, blood clots or swollen lymph nodes. · Allergic/Immunologic: No nasal congestion or hives. Physical Examination:    Vitals:    03/14/22 1107   BP: 114/72   Pulse: 67   SpO2: 97%   Weight: 225 lb 8 oz (102.3 kg)   Height: 5' 6\" (1.676 m)        Constitutional and General Appearance: Warm and dry, no apparent distress, normal coloration  HEENT:  Normocephalic, atraumatic  Respiratory:  · Normal excursion and expansion without use of accessory muscles  · Resp Auscultation: Normal breath sounds without dullness  Cardiovascular:  · The apical impulses not displaced  · Heart tones are crisp and normal  · JVP normal cm H2O  · Regular rate and rhythm  · Peripheral pulses are symmetrical and full  · There is no clubbing, cyanosis of the extremities.   · no edema  · Pedal Pulses: 2+ and equal   Abdomen:  · No masses or tenderness  · Liver/Spleen: No Abnormalities Noted  Neurological/Psychiatric:  · Alert and oriented in all spheres  · Moves all extremities well  · Exhibits normal gait balance and coordination  · No abnormalities of mood, affect, memory, mentation, or behavior are noted    Lab Data:    CBC:   Lab Results   Component Value Date    WBC 9.3 03/06/2021    WBC 11.0 10/14/2020    WBC 9.1 08/25/2020    RBC 5.04 03/06/2021    RBC 4.88 10/14/2020    RBC 4.83 08/25/2020    HGB 14.9 03/06/2021    HGB 14.4 10/14/2020    HGB 14.5 08/25/2020    HCT 45.5 03/06/2021    HCT 44.1 10/14/2020    HCT 43.9 08/25/2020    MCV 90.2 03/06/2021    MCV 90.5 10/14/2020    MCV 90.8 08/25/2020    RDW 14.1 03/06/2021    RDW 14.1 10/14/2020    RDW 13.6 08/25/2020     03/06/2021     10/14/2020     08/25/2020     BMP:  Lab Results   Component Value Date     03/11/2022     03/06/2021     10/14/2020    K 4.6 03/11/2022    K 4.5 03/06/2021    K 4.4 10/14/2020     03/11/2022     03/06/2021     10/14/2020    CO2 26 03/11/2022    CO2 29 03/06/2021    CO2 27 10/14/2020    PHOS 3.6 05/01/2015    BUN 16 03/11/2022    BUN 12 03/06/2021    BUN 13 10/14/2020    CREATININE 1.0 03/11/2022    CREATININE 1.0 03/06/2021    CREATININE 0.9 10/14/2020     BNP:   Lab Results   Component Value Date    PROBNP 670 03/11/2022    PROBNP 371 03/06/2021    PROBNP 671 08/25/2020     Cardiac Imaging:  Echo at [address] 8/10/2021  1. Tetralogy of Fallot       - s/p repair with non trans-annular patch infundibular resection (5/12/1963, [facility]).    2. Limited echocardiographic windows. Unable to accurately quantify ventricular function.  Recommend cross-sectional imaging.    3. Paradoxical interventricular septum motion.    4. Mild tricuspid valve regurgitation.    5. Dilated right atrium.    6. Right ventricle is borderline dilated and the systolic function is moderately diminished.    7. Left ventricle is mildly dilated and the systolic function is qualitatively low normal.    8. Echogenic density present in the innominate vein with turbulent flow which may be associated with lead.    9. There is no significant pericardial effusion. 10. Compared to the previous echocardiogram of 8/8/2019, there is no significant change. Echo 8/31/2020  Summary   HISTORY - TETROLOGY OF FALLOT WITH REPAIRS   *Left ventricle - normal size and thickness, indeterminate function due to   very poor endocardial definition   *Left atrium - dilated   *Right ventricle - dilated, indeterminate function due to poor endocardial   definition but appears reduced   *Pacer / ICD wire is visualized in the right ventricle. *Tricuspid valve - mild regurgitation with PASP of 56mmHg      Consider CORINA for better assessment of LV/RV function, wall motion, valvular   function    Echo 8-8-19  At [facility]   1. Tetralogy of Fallot      - s/p repair with non trans-annular patch infundibular resection (5/12/1963, [facility]). 2. Limited echocardiographic windows. Unable to accurately quantify ventricular function. 3. Left ventricle is mildly dilated and the systolic function is low normal.   4. Right ventricle is borderline dilated and the systolic function is moderately diminished. 5. Paradoxical interventricular septum motion. 6. TR jet velocity estimates RV pressure to be ~33 mmHg + CVP. 7. Mild tricuspid valve regurgitation. 8. There is no obvious residual septal defect. 9. Dilated right atrium. 10. No right ventricular outflow tract obstruction. 11. There is no significant pericardial effusion. 12. Compared to the previous echocardiogram of 4/4/2016, there is no significant change. Review of the prior echocardiogram, the left ventricular systolic function appears unchanged, low-normal. However, functional assessment is extremely limited. Recommend cardiac MRI given limited acoustic windows.     ECHO 7/21/17-- Technically difficult study due to lung interface and limited windows.  Patient has history of Tetralogy of Fallot repair with 2 Jazmin-Taussig shunt and atrial septal defect repair. Normal left ventricle size. There is mild concentric left ventricular hypertrophy.   Left ventricular function difficult to estimate due to poor endocardial  definition but appears reduced, likely severely reduced and comparable to  previous two echos. Abnormal septal motion. The left atrium is dilated.   There is trivial tricuspid regurgitation with RVSP estimated at 19 mmHg.     [facility]  TOF s/p repair with non trans-annular patch infundibular resection (May 12, 1963 [facility])  Limited echocardiographic windows. Unable to quantify ventricular function. No residual ventricular septal defect  Mild tricuspid valve regurgitation  Right ventricle normal in size and systolic function moderately diminished  Paradoxical interventricular septum motion  No right ventricle outflow obstruction  Mild pulmonary valve regurgitation  Trivial mitral valve regurgitation  Left ventricle is mildly dilated and systolic function moderately diminished  No sign of pericardial effusion  Compared to previous echo right ventricle pressure slightly decreased     Echo(3/15) shows normal LV function. Assessment:    1. Chronic systolic heart failure (HCC) on arb and bb   2. SINTIA treated with BiPAP    3. Tetralogy of Fallot s/p repair    4. ICD (implantable cardioverter-defibrillator) in place        Plan:   Patient Instructions   1. Increase lasix 60 mg alternating with 40 mg for a week and see if this helps shortness of breath  2. If no improvement with above then will do a chest xray  3. Schedule an echo and an appt with Dr Axel Rios in July 4.   RTO in 4-6 months    I appreciate the opportunity of cooperating in the care of this individual.    JEYSON Thayer - CNS, CNS, 3/14/2022, 4:08 PM

## 2022-03-14 NOTE — PATIENT INSTRUCTIONS
1.  Increase lasix 60 mg alternating with 40 mg for a week and see if this helps shortness of breath  2. If no improvement with above then will do a chest xray  3. Schedule an echo and an appt with Dr Daryle Lab in July 4.   RTO in 4-6 months

## 2022-03-21 ENCOUNTER — NURSE ONLY (OUTPATIENT)
Dept: CARDIOLOGY CLINIC | Age: 67
End: 2022-03-21
Payer: COMMERCIAL

## 2022-03-21 DIAGNOSIS — I50.22 CHRONIC SYSTOLIC HEART FAILURE (HCC): ICD-10-CM

## 2022-03-21 DIAGNOSIS — Z95.810 CARDIAC DEFIBRILLATOR IN PLACE: ICD-10-CM

## 2022-03-21 DIAGNOSIS — I42.8 NON-ISCHEMIC CARDIOMYOPATHY (HCC): ICD-10-CM

## 2022-03-21 PROCEDURE — 93297 REM INTERROG DEV EVAL ICPMS: CPT | Performed by: INTERNAL MEDICINE

## 2022-03-21 PROCEDURE — 93296 REM INTERROG EVL PM/IDS: CPT | Performed by: INTERNAL MEDICINE

## 2022-03-21 PROCEDURE — 93295 DEV INTERROG REMOTE 1/2/MLT: CPT | Performed by: INTERNAL MEDICINE

## 2022-03-21 NOTE — PROGRESS NOTES
We received remote transmission from patient's monitor at home. Transmission shows normal sensing and pacing function. LV threshold is historically elevated. Threshold is at programmed outputs. Pt has low bi-v pacing. EP physician will review. See interrogation under cardiology tab in the [address] [place] [address] [address] field for more details. Optivol is within normal range.

## 2022-04-04 RX ORDER — FUROSEMIDE 20 MG/1
40 TABLET ORAL DAILY
Qty: 60 TABLET | Refills: 0 | Status: SHIPPED | OUTPATIENT
Start: 2022-04-04 | End: 2022-04-29 | Stop reason: SDUPTHER

## 2022-04-04 NOTE — TELEPHONE ENCOUNTER
Prescription refill    Last OV:03/14/2022    Last Refill:12/21/2021    Labs:03/11/2022    Future Appt:08/15/2022

## 2022-04-28 NOTE — TELEPHONE ENCOUNTER
Medication Refill    Medication needing refilled: Furosemide    Dosage of the medication: 20 mg    How are you taking this medication (QD, BID, TID, QID, PRN): 2 tablets daily (pt states sometimes 3)    30 or 90 day supply called in: 30 (pt requesting 45 days or 90 tablets)    When will you run out of your medication: Friday    Which Pharmacy are we sending the medication to?: [facility] [id number] [facility], [address] [place] [address] [phone] - F [phone]   [address] [place] [address]   Phone:  [phone]  Fax:  [phone]

## 2022-04-28 NOTE — TELEPHONE ENCOUNTER
Last OV: 3/21/22  Last labs: 3/11/22  Appt scheduled : 7/18/22 RMM, 8/15/22 NPRG    Last noted dose was 60 mg am 40 mg pm

## 2022-04-29 RX ORDER — FUROSEMIDE 20 MG/1
40 TABLET ORAL DAILY
Qty: 60 TABLET | Refills: 0 | Status: SHIPPED | OUTPATIENT
Start: 2022-04-29 | End: 2022-05-24 | Stop reason: SDUPTHER

## 2022-05-20 DIAGNOSIS — I50.22 CHRONIC SYSTOLIC HEART FAILURE (HCC): Primary | ICD-10-CM

## 2022-05-20 NOTE — TELEPHONE ENCOUNTER
Pt states she has swelling in feet and ankles. Pt is asking if furosemide could be increased. Also states she has increased swelling in the summer.        Medication Refill    Medication needing refilled:furosemide (LASIX) - States she will be out on Sunday        Dosage of the medication:    How are you taking this medication (QD, BID, TID, QID, PRN):    30 or 90 day supply called in: 90 day supply    When will you run out of your medication:    Which Pharmacy are we sending the medication to?: [facility] [id number] [address], [facility] [phone]   [address] [facility], [address] [place] [address]   Phone:  [phone]  Fax:  [phone]

## 2022-05-24 ENCOUNTER — NURSE ONLY (OUTPATIENT)
Dept: CARDIOLOGY CLINIC | Age: 67
End: 2022-05-24
Payer: COMMERCIAL

## 2022-05-24 ENCOUNTER — TELEPHONE (OUTPATIENT)
Dept: CARDIOLOGY CLINIC | Age: 67
End: 2022-05-24

## 2022-05-24 DIAGNOSIS — I50.22 CHRONIC SYSTOLIC HEART FAILURE (HCC): ICD-10-CM

## 2022-05-24 DIAGNOSIS — Z95.810 IMPLANTABLE CARDIOVERTER-DEFIBRILLATOR (ICD) IN SITU: Chronic | ICD-10-CM

## 2022-05-24 DIAGNOSIS — I42.9 PRIMARY CARDIOMYOPATHY (HCC): Primary | Chronic | ICD-10-CM

## 2022-05-24 PROCEDURE — 93297 REM INTERROG DEV EVAL ICPMS: CPT | Performed by: CLINICAL NURSE SPECIALIST

## 2022-05-24 PROCEDURE — G2066 INTER DEVC REMOTE 30D: HCPCS | Performed by: CLINICAL NURSE SPECIALIST

## 2022-05-24 RX ORDER — FUROSEMIDE 40 MG/1
TABLET ORAL
Qty: 90 TABLET | Refills: 0 | Status: SHIPPED | OUTPATIENT
Start: 2022-05-24 | End: 2022-05-25 | Stop reason: SDUPTHER

## 2022-05-24 NOTE — TELEPHONE ENCOUNTER
CHF Screening Questionaire       1. Are you SOB? No      2. How long has this SOB been going on? none    3. Do you weigh yourself daily? Yes (patients should weigh themselves daily, at the same time of day, wearing the same amount of clothing, first thing in the morning after urinating - please encourage them to continue with daily weights with or without symptoms)     4. Has your weight gone up in the past 2 weeks? Yes *If yes, how much? 2-3 pds     5. What is your weight in the past 5 days? current weight 228.2     6. Do you have any swelling in your feet or legs? Yes      7. Is your abdomen bloated? No     8. Can you lie flat at night to sleep? Yes    9. Are you waking up at night because you can't breathe? No      10. Have you missed any medications especially diuretics? No     11. Have you started any new medications? No     12. Have you been eating out more or had a recent increase in salty foods? Yes      13. Any chest discomfort, dizziness or lightheadedness? No     Lasix 60 mg 1 day and 40 mg the next day  Had labs drawn this morning.

## 2022-05-24 NOTE — TELEPHONE ENCOUNTER
----- Message from JEYSON Quintana - CNS sent at 5/24/2022  8:47 AM EDT -----  Her optival is above threshold  Please call her and see what weight etc are  Will need to increase lasix  thanks

## 2022-05-24 NOTE — TELEPHONE ENCOUNTER
LMOM on machine for patient to call office back for results  See telephone encounter from 5-20-22. Romeo Lawrence

## 2022-05-24 NOTE — TELEPHONE ENCOUNTER
Ok I need information cleared as I have received 2 messages on this  Clarification of her lasix dose

## 2022-05-24 NOTE — TELEPHONE ENCOUNTER
Increase lasix to 40 mg bid and check labs   Order placed  I also sent script for her lasix which is 40 mg tablets and she will take 1 pill twice a day

## 2022-05-24 NOTE — TELEPHONE ENCOUNTER
I spoke with pt. She stated that she alternates her lasix 60-40 mg every other day. She stated that she has had some weight gain 2-3 lbs within a week. She also says she has some ARSLAN. She has concerns that we are coming to the summer moths and she will be on her feet more and has a tendency to swell more in the summer. She wants to know, will she need to increase her dose?

## 2022-05-24 NOTE — TELEPHONE ENCOUNTER
Signed                    I spoke with pt. She stated that she alternates her lasix 60-40 mg every other day. She stated that she has had some weight gain 2-3 lbs within a week. She also says she has some SOB. She has concerns that we are coming to the summer moths and she will be on her feet more and has a tendency to swell more in the summer. She wants to know, will she need to increase her dose?

## 2022-05-24 NOTE — PROGRESS NOTES
Remote transmission received from patient's CRT-D home monitor. Transmission shows normal sensing and pacing function. Known/ chronic elevated LV threshold. No new arrhythmias/ events recorded. AP 70.1%, CRT 99.0, Effective 75.2%, VSRp 0.1%    Possible Optivol fluid accumulation 5/13/22 --- ongoing. Currently elevated around 80, slightly above threshold, with correlating TI trend below reference line. NP will review. See interrogation under cardiology tab in the [facility] [address] field for more details. Will continue to monitor remotely.

## 2022-05-25 ENCOUNTER — TELEPHONE (OUTPATIENT)
Dept: CARDIOLOGY CLINIC | Age: 67
End: 2022-05-25

## 2022-05-25 LAB
ANION GAP SERPL CALCULATED.3IONS-SCNC: 19 MMOL/L (ref 3–16)
BUN BLDV-MCNC: 14 MG/DL (ref 7–20)
CALCIUM SERPL-MCNC: 9.2 MG/DL (ref 8.3–10.6)
CHLORIDE BLD-SCNC: 101 MMOL/L (ref 99–110)
CO2: 22 MMOL/L (ref 21–32)
CREAT SERPL-MCNC: 0.9 MG/DL (ref 0.6–1.2)
GFR AFRICAN AMERICAN: >60
GFR NON-AFRICAN AMERICAN: >60
GLUCOSE BLD-MCNC: 207 MG/DL (ref 70–99)
POTASSIUM SERPL-SCNC: 4.3 MMOL/L (ref 3.5–5.1)
PRO-BNP: 637 PG/ML (ref 0–124)
SODIUM BLD-SCNC: 142 MMOL/L (ref 136–145)

## 2022-05-25 RX ORDER — FUROSEMIDE 40 MG/1
TABLET ORAL
Qty: 180 TABLET | Refills: 0 | Status: SHIPPED | OUTPATIENT
Start: 2022-05-25 | End: 2022-07-05

## 2022-05-25 NOTE — TELEPHONE ENCOUNTER
I spoke to patient with labs results per NPRG . Patient verbalized understanding. Advised patient to call back with any questions. Talked with Patient and she needed a need Rx for lasix ,talked with NPRG and new order for Lasix 60 mg alternating 40 mg until pt losses the 2-3 lbs and then patient will take Lasix 60 mg alternating 40 mg. Patient verbalized understanding and new rx will be sent to pharmacy.

## 2022-05-25 NOTE — TELEPHONE ENCOUNTER
----- Message from JEYSON Kemp - CNS sent at 5/25/2022  8:49 AM EDT -----  Fluid level improved  Continue current medications  thanks

## 2022-06-24 NOTE — TELEPHONE ENCOUNTER
Anabella Eli, ANITHA         [birth year] 9:08 AM  Note  I spoke to patient with labs results per NPRG . Patient verbalized understanding. Advised patient to call back with any questions. Talked with Patient and she needed a need Rx for lasix ,talked with NPRG and new order for Lasix 60 mg alternating 40 mg until pt losses the 2-3 lbs and then patient will take Lasix 60 mg alternating 40 mg. Patient verbalized understanding and new rx will be sent to pharmacy.         Will close this encounter patient was informed

## 2022-07-05 RX ORDER — FUROSEMIDE 40 MG/1
40 TABLET ORAL 2 TIMES DAILY
Qty: 180 TABLET | Refills: 0 | Status: SHIPPED | OUTPATIENT
Start: 2022-07-05 | End: 2022-10-02

## 2022-07-05 NOTE — TELEPHONE ENCOUNTER
Prescription refill    Last OV:03/14/2022    Last Refill:05/25/2022    Labs:05/24/2022    Future Appt:08/15/2022    Spoke to patient she is currently taking furosemide 40 mg BID

## 2022-07-13 NOTE — PROGRESS NOTES
[address]   Electrophysiology Follow up   Date: 7/18/2022  I had the privilege of visiting Bam Fang in the office. CC:Biv OCD  HPI: Bam Fang is a 69 y.o. female history of Tetrology of Fallot s/p repair in 1963 with a Jazmin-Taussig shunt and VSD howie patch. She had an AICD placed for cardiomyopathy with EF 25% in 2008. Device was dual chamber [facility] [place] with [place] lead. She is pacemaker dependant and is being V paced more than 99%. She underwent LHC/RHC at [address] [place] [address] on July 9, 2015. Coronaries were normal, and EF reported 35%. Tetralogy of fallot repair 12/5/1963   Dr Gera Montilla; Both Jazmin-Taussig shunts ligated. Extreme infundibular stenosis. Normal cors noted. Repeated aortic cross-clamps noted, none exceeding 12 mins. Transverse incision into RV. Infundibulum extensively resected from PA side as impossible to identify from below. Marked aortic dextroposition noted. Large VSD closed with howie patch. No TAP. Initially CHB. \"The following is copied from [facility], Dr. Mary West: \"Myla had surgical repair of tetralogy of Fallot at the of 15 years by Dr. Gera Montilla at [facility]. These were in the early days of myocardial protection as well as in the early days of cardiopulmonary bypass. The surgery was notable for the absence of a trans annular patch, and also for a transverse rather short RV incision. The infundibulum was very tightly (3mm) stenosed and repeated bouts of aortic cross clamp were required to deal with this and the VSD closure. This clearly placed her myocardium at risk of ischemic injury. In 2008 she presented with heart block and an ejection fraction off 25%, and was fitted with an endovascular cardio defibrillator with AV sequential pacing ability. \"     She follows Dr. Maria Dolores Orellana for SINTIA and also nocturnal hypoxemia. Due to heart failure and wide paced QRS on 8/26/2015 she had Biv upgrade of her device. Upgrade was very challenging and required snaring of the LV lead. S/p Gen change 10/14/2020    [facility] presents to the office in follow. She completed an echo today but has not been officially read yet. 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 Has no cardiac complaints at this time and is feeling well without many complaints. Has some shortness of breath but it does not stop her from doing what she wants to do. Assessment and plan:   -Non-ischemic CMP, LV systolic dysfunction with history of prior EF 35% reported by cardiac cath, at [facility] heart,    Followed by the HF team   Continue medical therapy with carvedilol, lasix and losartan    s/p dual chamber AICD in 2008, BiV ICD upgrade 8/26/2015, Gen change 10/14/2020   The CIED was interrogated and programmed and I supervised and reviewed all the data. All findings and changes are in device interrogation sheet and reflect my personal interpretation and changes and is scanned to Epic. 3.7 years remaining, AP 71.6% ,  98.9%  Underlying rhythm CHB, elevated optivol      -HTN  Controlled: 120/80  BP goal <130/80  Home BP monitoring encouraged, printed information provided on how to accurately measure BP at home. Counseled to follow a low salt diet to assure blood pressure remains controlled for cardiovascular risk factor modification. The patient is counseled to get regular exercise 3-5 times per week and maintain a healthy weight reduce cardiovascular risk factors. -Obesity  Body mass index is 36.69 kg/m². Excessive weight is complicating assessment and treatment. It is placing patient at risk for multiple co-morbidities as well as early death and contributing to the patient's presentation.   Discussed weight management with diet and exercise      -SINTIA  Stable: Uses CPAP/Bipap/APAP  Encourage to use machine to prevent long term effects of untreated SINTIA      Patient Active Problem List    Diagnosis Date Noted    Primary cardiomyopathy ([facility] [address] 10/26/2011    ICD (implantable cardioverter-defibrillator) battery depletion     SOB (shortness of breath) 02/24/2020    Non morbid obesity, unspecified obesity type 01/14/2019    Mixed hyperlipidemia 07/21/2017    Hoarseness of voice 06/14/2016    COPD, severe ([address] 10/23/2015    Restrictive lung disease 10/23/2015    Presence of biventricular AICD     Acute respiratory failure with hypoxia (HCC)     Fatigue 08/19/2015    SINTIA treated with BiPAP 08/18/2015    Non-ischemic cardiomyopathy ([facility] [address] [facility]     Elevated diaphragm 06/24/2015    Hiatal hernia 06/24/2015    Hemorrhagic stroke ([facility] [address] [facility] 06/24/2015    Chronic systolic heart failure ([facility] [address] [facility] 03/16/2015    Tetralogy of Fallot s/p repair 07/15/2013    Bradycardia 07/16/2012    Implantable cardioverter-defibrillator (ICD) in situ 07/20/2011     Diagnostic studies:   ECG 7/18/22  SR, Vpaced       Echo 8/31/2020   Summary   HISTORY - TETROLOGY OF FALLOT WITH REPAIRS   *Left ventricle - normal size and thickness, indeterminate function due to   very poor endocardial definition   *Left atrium - dilated   *Right ventricle - dilated, indeterminate function due to poor endocardial   definition but appears reduced   *Pacer / ICD wire is visualized in the right ventricle. *Tricuspid valve - mild regurgitation with PASP of 56mmHg   Consider CORINA for better assessment of LV/RV function, wall motion, valvular   function    Echo 8/8/19  Summary:   1. Tetralogy of Fallot      - s/p repair with non trans-annular patch infundibular resection (5/12/1965, [facility]). 2. Limited echocardiographic windows. Unable to accurately quantify ventricular function. 3. Left ventricle is mildly dilated and the systolic function is low normal.   4. Right ventricle is borderline dilated and the systolic function is moderately diminished. 5. Paradoxical interventricular septum motion. 6. TR jet velocity estimates RV pressure to be ~33 mmHg + CVP.    7. Mild tricuspid valve regurgitation. 8. There is no obvious residual septal defect. 9. Dilated right atrium. 10. No right ventricular outflow tract obstruction. 11. There is no significant pericardial effusion. 12. Compared to the previous echocardiogram of 4/4/2016, there is no significant change. Review of the prior echocardiogram, the left ventricular systolic function appears unchanged, low-normal. However, functional assessment is extremely limited. Recommend cardiac MRI given limited acoustic windows. Echo 7/21/17:  Technically difficult study due to lung interface and limited windows. Patient has history of Tetralogy of Fallot repair with 2 Jazmin-Taussig   shunt and atrial septal defect repair. Normal left ventricle size. There is mild concentric left ventricular   hypertrophy. Left ventricular function difficult to estimate due to poor endocardial   definition but appears reduced, likely severely reduced and comparable to   previous two echos. Abnormal septal motion. The left atrium is dilated. There is trivial tricuspid regurgitation with RVSP estimated at 19 mmHg. [facility])  TOF s/p repair with non trans-annular patch infundibular resection (May 12, 1965 [facility])  Limited echocardiographic windows. Unable to quantify ventricular function. No residual ventricular septal defect  Mild tricuspid valve regurgitation  Right ventricle normal in size and systolic function moderately diminished  Paradoxical interventricular septum motion  No right ventricle outflow obstruction  Mild pulmonary valve regurgitation  Trivial mitral valve regurgitation  Left ventricle is mildly dilated and systolic function moderately diminished  No sign of pericardial effusion  Compared to previous echo right ventricle pressure slightly decreased     Echo 5/6/15 (@ [facility]):   Left ventricle: Poorly visualized. The cavity size was mildly dilated.  Systolic function was mildly to moderately reduced. Images were inadequate for LV wall motion assessment and dyssynchrony assessment. Aortic valve: Trivial regurgitation. Right ventricle: Poorly visualized. Objective parameters of systolic function were low normal to mild reduced: TAPSE: 1.8cm. Tricuspid annular systolic velocity: 5.9SF/F. Cardiac Anatomy  Left ventricle:   Poorly visualized. The cavity size was mildly dilated. Systolic function was mildly to moderately reduced. Images were inadequate for LV wall motion assessment and dyssynchrony assessment. MAPSE: 0.6cm. The tissue Doppler parameters were abnormal.  Aortic valve:  Poorly visualized. Probably trileaflet. Doppler: There was no stenosis. Trivial regurgitation. Peak velocity ratio of LVOT to aortic valve: 0.87. Peak gradient: 4mm Hg (S). Mitral valve: The valve appears to be grossly normal.  Doppler: There was no evidence for stenosis. Trivial regurgitation. Peak gradient: 5mm Hg (D). Left atrium:   Poorly visualized. The atrium was normal in size. Right ventricle:   Poorly visualized. Pacer wire or catheter noted in right ventricle. Objective parameters of systolic function were low normal to mild reduced: TAPSE: 1.8cm. Tricuspid annular systolic velocity: 3.9XZ/Z. Ventricular septum:   Septal motion showed abnormal function and dyssynergy. Pulmonic valve:  Poorly visualized. By limited imaging, there did not appear to be significant stenosis. Pulmonary regurgitation is likely at least moderate. Doppler:  Peak gradient: 3mm Hg (S). Tricuspid valve:   Poorly visualized. Doppler:  Trivial regurgitation. Pulmonary artery:   Not visualized. Systolic pressure could not be accurately estimated. Right atrium:   Poorly visualized. The atrium was normal in size. Pacer wire or catheter noted in right atrium. RHC/C 7/9/15 (@ [facility]):    HEMODYNAMICS: LVEDP: 15, LVEF:40%, RVEF30%  No gradient across the aortic valve, Gradient across the pulmonic valve:  peak gradient was 8 mmHg, mean gradient was 6 mmHg. Mild Transventicular patch leak, shunt was calculated to be not significant at 0.94. Moderate transpulmonic valve gradient. Narrowing of the pulmonic annulus, with no signficant stenosis of the pulmonic valve appreciated. I independently reviewed the cardiac diagnostic studies, ECG and relevant imaging studies. Lab Results   Component Value Date    LVEF 40 09/11/2018    LVEFMODE Echo 09/11/2018     No results found for: TSHFT4, TSH      Physical Examination:  Vitals:    07/18/22 1416   BP: 120/80   Pulse: 75   SpO2: 92%      Wt Readings from Last 3 Encounters:   07/18/22 227 lb 4.8 oz (103.1 kg)   03/14/22 225 lb 8 oz (102.3 kg)   09/17/21 236 lb (107 kg)       Constitutional: Oriented. No distress. Head: Normocephalic and atraumatic. Mouth/Throat: Oropharynx is clear and moist.   Eyes: Conjunctivae normal. EOM are normal.   Neck: Neck supple. No JVD present. Cardiovascular: Normal rate, regular rhythm, S1&S2. Pulmonary/Chest: Bilateral respiratory sounds. No rhonchi. Abdominal: Soft. No tenderness. Musculoskeletal: No tenderness. No edema    Lymphadenopathy: Has no cervical adenopathy. Neurological: Alert and oriented. Follows command, No Gross deficit   Skin: Skin is warm, No rash noted. Psychiatric: Has a normal behavior        Review of System:  [x] Full ROS obtained and negative except as mentioned in HPI    Prior to Admission medications    Medication Sig Start Date End Date Taking? Authorizing Provider   Multiple Vitamins-Minerals (ICAPS AREDS 2 PO) Take by mouth   Yes Historical Provider, MD   furosemide (LASIX) 40 MG tablet Take 1 tablet by mouth 2 times daily 7/5/22  Yes JEYSON Pickett - CNS   doxycycline hyclate (VIBRAMYCIN) 100 MG capsule Take one per day for 10 days and then take one every M-W-F after that.  3/4/22  Yes Juan Miguel Li MD   Fluticasone furoate-vilanterol (BREO ELLIPTA) 200-25 MCG/INH AEPB inhaler INHALE ONE DOSE BY MOUTH DAILY 12/17/21  Yes Arden Rose MD   SPIRIVA RESPIMAT 2.5 MCG/ACT AERS inhaler INHALE TWO PUFFS BY MOUTH DAILY 12/17/21  Yes Arden Rose MD   carvedilol (COREG) 12.5 MG tablet TAKE ONE TABLET BY MOUTH TWICE A DAY 12/15/21  Yes Alissarivera [place], APRN - CNS   albuterol sulfate HFA (PROVENTIL HFA) 108 (90 Base) MCG/ACT inhaler Inhale 2 puffs into the lungs every 4 hours as needed for Wheezing or Shortness of Breath 8/26/20  Yes Arden Rose MD   Multiple Vitamins-Minerals (MULTIVITAMIN ADULT PO) Take by mouth   Yes Historical Provider, MD   losartan (COZAAR) 100 MG tablet Take 100 mg by mouth daily   Yes Historical Provider, MD   fluticasone (FLONASE) 50 MCG/ACT nasal spray 2 sprays by Nasal route daily  Patient taking differently: 2 sprays by Nasal route daily as needed 7/14/16  Yes Lorin Hope MD   BiPAP Machine MISC by Does not apply route   Yes Historical Provider, MD   metFORMIN (GLUCOPHAGE) 500 MG tablet Take 500 mg by mouth 2 times daily (with meals)    Yes Historical Provider, MD   atorvastatin (LIPITOR) 20 MG tablet Take 20 mg by mouth daily. Yes Historical Provider, MD   aspirin EC 81 MG EC tablet Take 1 tablet by mouth daily. 7/15/13  Yes Desiree Quintero MD   acetaminophen (TYLENOL) 325 MG tablet Take 650 mg by mouth every 6 hours as needed. Yes Historical Provider, MD       Allergies   Allergen Reactions    Lisinopril      Cough       Social History:  Reviewed. reports that she has never smoked. She has never used smokeless tobacco. She reports that she does not drink alcohol and does not use drugs. Family History:  Reviewed. Reviewed. No family history of SCD. Relevant and available labs, and cardiovascular diagnostics reviewed. Reviewed. I independently reviewed relevant and available cardiac diagnostic tests ECG, CXR, Echo, Stress test, Device interrogation, Holter, CT scan.    Outside medical records via Care everywhere reviewed and summarized in H&P above. Complex medical condition with multiple medical problems affecting prognosis and outcome of EP interventions      All questions and concerns were addressed to the patient/family. Alternatives to my treatment were discussed. I have discussed the above stated plan and the patient verbalized understanding and agreed with the plan. Scribe attestation: This note was scribed in the presence of Dex Person MD by Davon Hoyt RN  Physician Attestation: I, Dr. Dex Person, confirm that the scribe's documentation has been prepared under my direction and personally reviewed by me in its entirety. I also confirm that the note above accurately reflects all work, treatment, procedures, and medical decision making performed by me. NOTE: This report was transcribed using voice recognition software. Every effort was made to ensure accuracy, however, inadvertent computerized transcription errors may be present.      Dex Person MD, MPH  [facility]   Office: [phone]  Fax: [phone]

## 2022-07-18 ENCOUNTER — HOSPITAL ENCOUNTER (OUTPATIENT)
Dept: NON INVASIVE DIAGNOSTICS | Age: 67
Discharge: HOME | End: 2022-07-18
Payer: COMMERCIAL

## 2022-07-18 ENCOUNTER — NURSE ONLY (OUTPATIENT)
Dept: CARDIOLOGY CLINIC | Age: 67
End: 2022-07-18
Payer: COMMERCIAL

## 2022-07-18 ENCOUNTER — OFFICE VISIT (OUTPATIENT)
Dept: CARDIOLOGY CLINIC | Age: 67
End: 2022-07-18
Payer: COMMERCIAL

## 2022-07-18 VITALS
HEART RATE: 75 BPM | WEIGHT: 227.3 LBS | HEIGHT: 66 IN | BODY MASS INDEX: 36.53 KG/M2 | DIASTOLIC BLOOD PRESSURE: 80 MMHG | OXYGEN SATURATION: 92 % | SYSTOLIC BLOOD PRESSURE: 120 MMHG

## 2022-07-18 DIAGNOSIS — Z95.810 IMPLANTABLE CARDIOVERTER-DEFIBRILLATOR (ICD) IN SITU: Primary | Chronic | ICD-10-CM

## 2022-07-18 DIAGNOSIS — G47.33 OSA TREATED WITH BIPAP: Chronic | ICD-10-CM

## 2022-07-18 DIAGNOSIS — Z87.74 S/P TOF (TETRALOGY OF FALLOT) REPAIR: ICD-10-CM

## 2022-07-18 DIAGNOSIS — Z95.810 AUTOMATIC IMPLANTABLE CARDIOVERTER-DEFIBRILLATOR IN SITU: ICD-10-CM

## 2022-07-18 DIAGNOSIS — I42.8 NON-ISCHEMIC CARDIOMYOPATHY (HCC): ICD-10-CM

## 2022-07-18 DIAGNOSIS — I50.22 CHRONIC SYSTOLIC HEART FAILURE (HCC): ICD-10-CM

## 2022-07-18 PROCEDURE — 1123F ACP DISCUSS/DSCN MKR DOCD: CPT | Performed by: INTERNAL MEDICINE

## 2022-07-18 PROCEDURE — 99214 OFFICE O/P EST MOD 30 MIN: CPT | Performed by: INTERNAL MEDICINE

## 2022-07-18 PROCEDURE — 93306 TTE W/DOPPLER COMPLETE: CPT

## 2022-07-18 NOTE — PROGRESS NOTES
Patient comes in for programming evaluation for her defibrillator. All sensing and pacing parameters are within normal range. LV Threshold 1.75 V / 1.00 ms today. Battery life 3.7 years  AP 71.6%.  98.9%. No episodes noted. Patient remains on Coreg. No changes need to be made at this time. Please see interrogation for more detail. Optivol is elevated. · Possible OptiVol fluid accumulation: 12-Jul-2022 -- ongoing. Patient will see Dr. No Khan today and follow up in 3 months in office or remotely.

## 2022-07-19 ENCOUNTER — NURSE ONLY (OUTPATIENT)
Dept: CARDIOLOGY CLINIC | Age: 67
End: 2022-07-19
Payer: COMMERCIAL

## 2022-07-19 DIAGNOSIS — Z95.810 IMPLANTABLE CARDIOVERTER-DEFIBRILLATOR (ICD) IN SITU: Chronic | ICD-10-CM

## 2022-07-19 DIAGNOSIS — I42.9 PRIMARY CARDIOMYOPATHY (HCC): Primary | Chronic | ICD-10-CM

## 2022-07-19 DIAGNOSIS — I50.22 CHRONIC SYSTOLIC HEART FAILURE (HCC): ICD-10-CM

## 2022-07-19 PROCEDURE — 93295 DEV INTERROG REMOTE 1/2/MLT: CPT | Performed by: INTERNAL MEDICINE

## 2022-07-19 PROCEDURE — 93296 REM INTERROG EVL PM/IDS: CPT | Performed by: INTERNAL MEDICINE

## 2022-07-19 PROCEDURE — 93297 REM INTERROG DEV EVAL ICPMS: CPT | Performed by: NURSE PRACTITIONER

## 2022-07-19 NOTE — PROGRESS NOTES
Manual remote transmission received from patient's CRT-D monitor at home. Transmission shows normal sensing and pacing function. Known hx elevated LV threshold w Adaptive output at max 3.0V/1.0ms. Recent OV 07.18.22 w device/RMM. No new arrhythmias/events recorded since 06.20.22. EP physician will review. Ap 79.1%  BiVp 98.7%, Effective 74.0% (known and suspect d/t possible intermittent loss of LV capture w output at threshold), VSRp <0.1%  PVCs 26.2/hr    Possible Optivol fluid accumulation 07.12.22-ongoing, currently elevated slightly above 60 threshold w correlating TI trend slightly below reference line. TriageF Heart Failure Risk Status on 18-Jul-2022 is Medium*. NP will review. End of 91-day monitoring period 7/19/22. See interrogation under cardiology tab in the [address] [place] [address] [address] field for more details. Will continue to monitor remotely.

## 2022-07-21 PROCEDURE — 93284 PRGRMG EVAL IMPLANTABLE DFB: CPT | Performed by: INTERNAL MEDICINE

## 2022-07-27 ENCOUNTER — TELEPHONE (OUTPATIENT)
Dept: CARDIOLOGY CLINIC | Age: 67
End: 2022-07-27

## 2022-07-27 NOTE — TELEPHONE ENCOUNTER
I LM for patient regarding the results of her echo    She needs to schedule an appt with Dr Jaky Guzman in the next couple months  Please call and schedule her  Reason is congenital heart defect, tetralogy of fallot  thank

## 2022-07-27 NOTE — TELEPHONE ENCOUNTER
----- Message from Farhad Esteban MD sent at 7/26/2022 10:29 PM EDT -----  I would refer her to Esperanza Lopez since he is congenital heart specialist.  [facility] but doesn't look like she has been there in awhile.       Should I do an limited echo with definity  We have never been able to get LVEF on her due to past tetralogy surgery  thanks

## 2022-08-02 NOTE — TELEPHONE ENCOUNTER
Pt was called to make an appointment, but was hesitant because she was unsure why she was being referred. I told her that [facility] specializes in congenital disease. She agreed to make the appointment and was transferred to schedule.

## 2022-08-15 ENCOUNTER — OFFICE VISIT (OUTPATIENT)
Dept: CARDIOLOGY CLINIC | Age: 67
End: 2022-08-15
Payer: COMMERCIAL

## 2022-08-15 ENCOUNTER — NURSE ONLY (OUTPATIENT)
Dept: CARDIOLOGY CLINIC | Age: 67
End: 2022-08-15

## 2022-08-15 VITALS
SYSTOLIC BLOOD PRESSURE: 130 MMHG | HEIGHT: 66 IN | BODY MASS INDEX: 36.87 KG/M2 | OXYGEN SATURATION: 92 % | HEART RATE: 69 BPM | WEIGHT: 229.4 LBS | DIASTOLIC BLOOD PRESSURE: 83 MMHG

## 2022-08-15 DIAGNOSIS — G47.33 OSA TREATED WITH BIPAP: ICD-10-CM

## 2022-08-15 DIAGNOSIS — Z87.74 TETRALOGY OF FALLOT S/P REPAIR: ICD-10-CM

## 2022-08-15 DIAGNOSIS — I50.22 CHRONIC SYSTOLIC HEART FAILURE (HCC): Primary | ICD-10-CM

## 2022-08-15 DIAGNOSIS — Z95.810 ICD (IMPLANTABLE CARDIOVERTER-DEFIBRILLATOR) IN PLACE: ICD-10-CM

## 2022-08-15 PROCEDURE — 99214 OFFICE O/P EST MOD 30 MIN: CPT | Performed by: CLINICAL NURSE SPECIALIST

## 2022-08-15 PROCEDURE — 1123F ACP DISCUSS/DSCN MKR DOCD: CPT | Performed by: CLINICAL NURSE SPECIALIST

## 2022-08-15 NOTE — PROGRESS NOTES
[facility]  Progress Note    Primary Care Doctor:  Baldemar Garcia MD    Chief Complaint   Patient presents with    Congestive Heart Failure        History of Present Illness:  69 y.o. 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 She follows at [address] [place] [address] Dr every 2 years with Dr Prabhjot Simms. SINTIA on bipap   Works for [employer]    I had the pleasure of seeing Jett Perez in follow up for tetrology of fallot and systolic heart failure. She is ambulatory and by her self. She had an echo in July which shows moderately dilated RV. She is tolerating and taking all her medications. Lender Councilman shows increased impedence in May and July but back to normal now. 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 She denies any increased shortness of breath (follows with Dr Haleigh Alcaraz), chest pain, palpitations or lightheadedness. Past Medical History:   has a past medical history of Bradycardia, Hyperlipidemia, Hypertension, SINTIA treated with BiPAP, Stroke, hemorrhagic ([address] and Type II or unspecified type diabetes mellitus without mention of complication, not stated as uncontrolled. Surgical History:   has a past surgical history that includes Cardiac defibrillator placement; Ovary removal; Cardiac catheterization (5/2015); Cholecystectomy; Cardiac defibrillator placement (8/26/15); and Cardiac defibrillator placement (Left, 10/2020). Social History:   reports that she has never smoked. She has never used smokeless tobacco. She reports that she does not drink alcohol and does not use drugs. Family History:   Family History   Problem Relation Age of Onset    Diabetes Mother     High Cholesterol Mother     High Blood Pressure Mother     Heart Disease Father        Home Medications:  Prior to Admission medications    Medication Sig Start Date End Date Taking?  Authorizing Provider   Multiple Vitamins-Minerals (ICAPS AREDS 2 PO) Take by mouth   Yes Historical Provider, MD   furosemide (LASIX) 40 MG tablet Take 1 tablet by mouth 2 times daily 7/5/22  Yes JEYSON Shaw   doxycycline hyclate (VIBRAMYCIN) 100 MG capsule Take one per day for 10 days and then take one every M-W-F after that. 3/4/22  Yes Russell Stout MD   Fluticasone furoate-vilanterol (BREO ELLIPTA) 200-25 MCG/INH AEPB inhaler INHALE ONE DOSE BY MOUTH DAILY 12/17/21  Yes Russell Stout MD   SPIRIVA RESPIMAT 2.5 MCG/ACT AERS inhaler INHALE TWO PUFFS BY MOUTH DAILY 12/17/21  Yes Russell Stout MD   carvedilol (COREG) 12.5 MG tablet TAKE ONE TABLET BY MOUTH TWICE A DAY 12/15/21  Yes JEYSON Shaw   albuterol sulfate HFA (PROVENTIL HFA) 108 (90 Base) MCG/ACT inhaler Inhale 2 puffs into the lungs every 4 hours as needed for Wheezing or Shortness of Breath 8/26/20  Yes Russell Sotut MD   Multiple Vitamins-Minerals (MULTIVITAMIN ADULT PO) Take by mouth   Yes Historical Provider, MD   losartan (COZAAR) 100 MG tablet Take 100 mg by mouth daily   Yes Historical Provider, MD   fluticasone (FLONASE) 50 MCG/ACT nasal spray 2 sprays by Nasal route daily  Patient taking differently: 2 sprays by Nasal route daily as needed 7/14/16  Yes Aracely Cuevas MD   BiPAP Machine MISC by Does not apply route   Yes Historical Provider, MD   metFORMIN (GLUCOPHAGE) 500 MG tablet Take 500 mg by mouth 2 times daily (with meals)    Yes Historical Provider, MD   atorvastatin (LIPITOR) 20 MG tablet Take 20 mg by mouth daily. Yes Historical Provider, MD   aspirin EC 81 MG EC tablet Take 1 tablet by mouth daily. 7/15/13  Yes Teetee Whitley MD   acetaminophen (TYLENOL) 325 MG tablet Take 650 mg by mouth every 6 hours as needed. Yes Historical Provider, MD        Allergies:  Lisinopril     Review of Systems:   Constitutional: there has been no unanticipated weight loss.  There's been no change in energy level, sleep pattern, or activity level. Eyes: No visual changes or diplopia. No scleral icterus. ENT: No Headaches, hearing loss or vertigo. No mouth sores or sore throat. Cardiovascular: Reviewed in HPI  Respiratory: No cough or wheezing, no sputum production. No hematemesis. Gastrointestinal: No abdominal pain, appetite loss, blood in stools. No change in bowel or bladder habits. Genitourinary: No dysuria, trouble voiding, or hematuria. Musculoskeletal:  No gait disturbance, weakness or joint complaints. Integumentary: No rash or pruritis. Neurological: No headache, diplopia, change in muscle strength, numbness or tingling. No change in gait, balance, coordination, mood, affect, memory, mentation, behavior. Psychiatric: No anxiety, no depression. Endocrine: No malaise, fatigue or temperature intolerance. No excessive thirst, fluid intake, or urination. No tremor. Hematologic/Lymphatic: No abnormal bruising or bleeding, blood clots or swollen lymph nodes. Allergic/Immunologic: No nasal congestion or hives. Physical Examination:    Vitals:    08/15/22 1452   BP: 130/83   Pulse: 69   SpO2: 92%   Weight: 229 lb 6.4 oz (104.1 kg)   Height: 5' 6\" (1.676 m)        Constitutional and General Appearance: Warm and dry, no apparent distress, normal coloration  HEENT:  Normocephalic, atraumatic  Respiratory:  Normal excursion and expansion without use of accessory muscles  Resp Auscultation: Normal breath sounds without dullness  Cardiovascular: The apical impulses not displaced  Heart tones are crisp and normal  JVP normal cm H2O  Regular rate and rhythm  Peripheral pulses are symmetrical and full  There is no clubbing, cyanosis of the extremities.   no edema  Pedal Pulses: 2+ and equal   Abdomen:obese  No masses or tenderness  Liver/Spleen: No Abnormalities Noted  Neurological/Psychiatric:  Alert and oriented in all spheres  Moves all extremities well  Exhibits normal gait balance and coordination  No abnormalities of mood, affect, memory, mentation, or behavior are noted    Lab Data:    CBC:   Lab Results   Component Value Date/Time    WBC 9.3 03/06/2021 09:20 AM    WBC 11.0 10/14/2020 11:15 AM    WBC 9.1 08/25/2020 07:10 AM    RBC 5.04 03/06/2021 09:20 AM    RBC 4.88 10/14/2020 11:15 AM    RBC 4.83 08/25/2020 07:10 AM    HGB 14.9 03/06/2021 09:20 AM    HGB 14.4 10/14/2020 11:15 AM    HGB 14.5 08/25/2020 07:10 AM    HCT 45.5 03/06/2021 09:20 AM    HCT 44.1 10/14/2020 11:15 AM    HCT 43.9 08/25/2020 07:10 AM    MCV 90.2 03/06/2021 09:20 AM    MCV 90.5 10/14/2020 11:15 AM    MCV 90.8 08/25/2020 07:10 AM    RDW 14.1 03/06/2021 09:20 AM    RDW 14.1 10/14/2020 11:15 AM    RDW 13.6 08/25/2020 07:10 AM     03/06/2021 09:20 AM     10/14/2020 11:15 AM     08/25/2020 07:10 AM     BMP:  Lab Results   Component Value Date/Time     05/24/2022 11:10 AM     03/11/2022 12:22 PM     03/06/2021 09:20 AM    K 4.3 05/24/2022 11:10 AM    K 4.6 03/11/2022 12:22 PM    K 4.5 03/06/2021 09:20 AM     05/24/2022 11:10 AM     03/11/2022 12:22 PM     03/06/2021 09:20 AM    CO2 22 05/24/2022 11:10 AM    CO2 26 03/11/2022 12:22 PM    CO2 29 03/06/2021 09:20 AM    PHOS 3.6 05/01/2015 02:15 PM    BUN 14 05/24/2022 11:10 AM    BUN 16 03/11/2022 12:22 PM    BUN 12 03/06/2021 09:20 AM    CREATININE 0.9 05/24/2022 11:10 AM    CREATININE 1.0 03/11/2022 12:22 PM    CREATININE 1.0 03/06/2021 09:20 AM     BNP:   Lab Results   Component Value Date/Time    PROBNP 637 05/24/2022 11:10 AM    PROBNP 670 03/11/2022 12:22 PM    PROBNP 371 03/06/2021 09:20 AM     Cardiac Imaging:  Echo 7/18/2022  Summary  Very poor image quality likely due to scar tissue from prior congenital heart surgery. Overall left ventricular systolic function appears globally depressed. Normal left ventricular size. Unable to estimate LVEF due to inadequate endocardial border definition.   RV not well visualized but appears moderately dilated indeterminate function due to poor endocardial   definition but appears reduced   *Pacer / ICD wire is visualized in the right ventricle. *Tricuspid valve - mild regurgitation with PASP of 56mmHg      Consider CORINA for better assessment of LV/RV function, wall motion, valvular   function    Echo 8-8-19  At [facility]   1. Tetralogy of Fallot      - s/p repair with non trans-annular patch infundibular resection (5/12/1963, [facility]). 2. Limited echocardiographic windows. Unable to accurately quantify ventricular function. 3. Left ventricle is mildly dilated and the systolic function is low normal.   4. Right ventricle is borderline dilated and the systolic function is moderately diminished. 5. Paradoxical interventricular septum motion. 6. TR jet velocity estimates RV pressure to be ~33 mmHg + CVP. 7. Mild tricuspid valve regurgitation. 8. There is no obvious residual septal defect. 9. Dilated right atrium. 10. No right ventricular outflow tract obstruction. 11. There is no significant pericardial effusion. 12. Compared to the previous echocardiogram of 4/4/2016, there is no significant change. Review of the prior echocardiogram, the left ventricular systolic function appears unchanged, low-normal. However, functional assessment is extremely limited. Recommend cardiac MRI given limited acoustic windows. ECHO 7/21/17-- Technically difficult study due to lung interface and limited windows. Patient has history of Tetralogy of Fallot repair with 2 Jazmin-Taussig shunt and atrial septal defect repair. Normal left ventricle size. There is mild concentric left ventricular hypertrophy. Left ventricular function difficult to estimate due to poor endocardial  definition but appears reduced, likely severely reduced and comparable to  previous two echos. Abnormal septal motion. The left atrium is dilated. There is trivial tricuspid regurgitation with RVSP estimated at 19 mmHg.      [facility]  TOF s/p repair with non trans-annular patch infundibular resection (May [birth year], 1965 [facility])  Limited echocardiographic windows. Unable to quantify ventricular function. No residual ventricular septal defect  Mild tricuspid valve regurgitation  Right ventricle normal in size and systolic function moderately diminished  Paradoxical interventricular septum motion  No right ventricle outflow obstruction  Mild pulmonary valve regurgitation  Trivial mitral valve regurgitation  Left ventricle is mildly dilated and systolic function moderately diminished  No sign of pericardial effusion  Compared to previous echo right ventricle pressure slightly decreased     Echo(3/15) shows normal LV function. Assessment:    1. Chronic systolic heart failure (HCC) on arb and bb   2. SINTIA treated with BiPAP    3. Tetralogy of Fallot s/p repair    4. ICD (implantable cardioverter-defibrillator) in place        Plan:   Patient Instructions   Continue current medications. Look up entresto   RTO in sept 15th with Dr Zac Peace      sacubitril and valsartan  Pronunciation: ingrid cardozo and piyush PATSY nicholson  Brand: Entresto  What is the most important information I should know about sacubitril and valsartan? Do not use if you are pregnant, and tell your doctor right away if you become pregnant. If you have diabetes, do not use sacubitril and valsartan together with any medication that containsaliskiren (a blood pressure medicine). What is sacubitril and valsartan? Sacubitril and valsartan are blood pressure medicines. Valsartan is anangiotensin II receptor blocker (sometimes called an ARB). Sacubitril and valsartan is a combination medicine that is used in adults with chronic heart failure. This medicine helps lower the risk of needing to be hospitalized when symptoms get worse, and helps lower the risk of death fromheart failure.   Sacubitril and valsartan is also used to treat heart failure in children whoare at least 1 year old. Sacubitril and valsartan is usually given together with other heart medications. Sacubitril and valsartan may also be used for purposes not listed in thismedication guide. What should I discuss with my healthcare provider before taking sacubitril and valsartan? You should not use this medicine if you are allergic to sacubitril or valsartan (Diovan), or if you have ever had a severe allergic reaction to a bloodpressure medication such as:  an ACE inhibitor--benazepril, captopril, enalapril, fosinopril, lisinopril, moexipril, perindopril, quinapril, ramipril, trandolapril (Lotensin, Vasotec, Prinivil, Accupril, Mavik, and others); or  an ARB--azilsartan, candesartan, eprosartan, irbesartan, losartan, olmesartan, telmisartan, valsartan (Atacand, Avapro, Benicar, Diovan, Edarbi, Micardis, Teveten, and others). You should not take sacubitril and valsartan within 36 hours before or after you have taken any ACE inhibitor medication. If you have diabetes, do not use sacubitril and valsartan together with any medication that containsaliskiren (a blood pressure medicine). You may also need to avoid taking sacubitril and valsartan with aliskiren if you have kidney disease. Tell your doctor if you have ever had:  liver disease;  hereditary angioedema; or  if you are on a low-salt-diet. Do not use if you are pregnant, and tell your doctor right away if you become pregnant. Sacubitril and valsartan can cause injury or death to the unborn baby if youtake the medicine during your second or third trimester. You should not breastfeed while using this medicine. How should I take sacubitril and valsartan? Follow all directions on your prescription label and read all medication guides or instruction sheets. Your doctor may occasionally change your dose. Use themedicine exactly as directed. You may take this medicine with or without food. Sacubitril and valsartan doses are based on weight in children.  Your child'sdose needs may change if the child gains or loses weight. If you cannot swallow a tablet whole, a pharmacist can make an oral suspension (liquid). Tell the doctor if theperson taking this medicine has trouble swallowing the tablet. Shake the oral suspension (liquid) before you measure a dose. Use the dosing syringe provided, or use amedicine dose-measuring device (not a kitchen spoon). Your blood pressure will need to be checked often. Your kidney function mayalso need to be checked. Store at room temperature away from moisture and heat. Throw away any oral suspension not used within 15 days after it was mixed. Do not keep the oral suspension jacinto refrigerator. What happens if I miss a dose? Take the medicine as soon as you can, but skip the missed dose if it is almost time for your next dose. Do not take two doses at one time. What happens if I overdose? Seek emergency medical attention or call the Poison Help line at 1-995.145.9502. What should I avoid while taking sacubitril and valsartan? Do not use potassium supplements or salt substitutes, unless your doctor hastold you to. Avoid getting up too fast from a sitting or lying position, or you may feeldizzy. What are the possible side effects of sacubitril and valsartan? Get emergency medical help if you have signs of an allergic reaction: hives; difficulty breathing; swelling of your face, lips, tongue, or throat. You may be more likely to have an allergic reaction if you are -American. Also call your doctor at once if you have:  a light-headed feeling, like you might pass out;  extreme tiredness;  high potassium --slow heart rate, weak pulse, muscle weakness, tingly feeling; or  kidney problems --little or no urination, rapid weight gain, painful or difficult urination, swelling in your hands, feet, or ankles. Common side effects may include:  kidney problems;  high potassium;  dizziness, feeling light-headed; or  cough.   This is not a complete list of side effects and others may occur. Call your doctor for medical advice about side effects. You may report side effects toFDA at [phone]. What other drugs will affect sacubitril and valsartan? Tell your doctor about all your other medicines, especially:  aliskiren;  lithium;  any other heart or blood pressure medicines;  a diuretic or \"water pill\";  medicine or mineral supplements that contain potassium; or  NSAIDs (nonsteroidal anti-inflammatory drugs) --aspirin, ibuprofen (Advil, Motrin), naproxen (Aleve), celecoxib, diclofenac, indomethacin, meloxicam, and others. This list is not complete. Other drugs may affect sacubitril and valsartan, including prescription and over-the-counter medicines, vitamins, and herbalproducts. Not all possible drug interactions are listed here. Where can I get more information? Your pharmacist can provide more information about sacubitril and valsartan. Remember, keep this and all other medicines out of the reach of children, never share your medicines with others, and use this medication only for the indication prescribed. Every effort has been made to ensure that the information provided by [facility] [facility] is accurate, up-to-date, and complete, but no guarantee is made to that effect. Drug information contained herein may be time sensitive. [facility] information has been compiled for use by healthcare practitioners and consumers in the [place] and therefore [employer] does not warrant that uses outside of the [place] are appropriate, unless specifically indicated otherwise. [facility] drug information does not endorse drugs, diagnose patients or recommend therapy.  [facility] drug information is an informational resource designed to assist licensed healthcare practitioners in caring for their patients and/or to serve consumers viewing this service as a supplement to, and not a substitute for, the expertise, skill, knowledge and judgment of healthcare practitioners. The absence of a warning for a given drug or drug combination in no way should be construed to indicate that the drug or drug combination is safe, effective or appropriate for any given patient. [facility] does not assume any responsibility for any aspect of healthcare administered with the aid of information [facility] provides. The information contained herein is not intended to cover all possible uses, directions, precautions, warnings, drug interactions, allergic reactions, or adverse effects. If you have questions about the drugs you are taking, check with yourdoctor, nurse or pharmacist.  Copyright 0832-7583 [address]. Version: 5.01. Revision date: 4/12/2021. Care instructions adapted under license by [facility] ([facility]). If you have questions about a medical condition or this instruction, always ask your healthcare professional. [place] [address] any warranty or liability for your use of this information.     I appreciate the opportunity of cooperating in the care of this individual.    JEYSON Carroll, CNS, 8/15/2022, 3:54 PM

## 2022-08-15 NOTE — PATIENT INSTRUCTIONS
Continue current medications. Look up entresto   RTO in sept 15th with Dr Lizet Delatorre      sacubitril and valsartan  Pronunciation: ingrid MARYANNE robles juliane and piyush PATSY nicholson  Brand: Entresto  What is the most important information I should know about sacubitril and valsartan? Do not use if you are pregnant, and tell your doctor right away if you become pregnant. If you have diabetes, do not use sacubitril and valsartan together with any medication that containsaliskiren (a blood pressure medicine). What is sacubitril and valsartan? Sacubitril and valsartan are blood pressure medicines. Valsartan is anangiotensin II receptor blocker (sometimes called an ARB). Sacubitril and valsartan is a combination medicine that is used in adults with chronic heart failure. This medicine helps lower the risk of needing to be hospitalized when symptoms get worse, and helps lower the risk of death fromheart failure. Sacubitril and valsartan is also used to treat heart failure in children whoare at least 3year old. Sacubitril and valsartan is usually given together with other heart medications. Sacubitril and valsartan may also be used for purposes not listed in thismedication guide. What should I discuss with my healthcare provider before taking sacubitril and valsartan? You should not use this medicine if you are allergic to sacubitril or valsartan (Diovan), or if you have ever had a severe allergic reaction to a bloodpressure medication such as:  an ACE inhibitor--benazepril, captopril, enalapril, fosinopril, lisinopril, moexipril, perindopril, quinapril, ramipril, trandolapril (Lotensin, Vasotec, Prinivil, Accupril, Mavik, and others); or  an ARB--azilsartan, candesartan, eprosartan, irbesartan, losartan, olmesartan, telmisartan, valsartan (Atacand, Avapro, Benicar, Diovan, Edarbi, Micardis, Teveten, and others). You should not take sacubitril and valsartan within 36 hours before or after you have taken any ACE inhibitor medication. If you have diabetes, do not use sacubitril and valsartan together with any medication that containsaliskiren (a blood pressure medicine). You may also need to avoid taking sacubitril and valsartan with aliskiren if you have kidney disease. Tell your doctor if you have ever had:  liver disease;  hereditary angioedema; or  if you are on a low-salt-diet. Do not use if you are pregnant, and tell your doctor right away if you become pregnant. Sacubitril and valsartan can cause injury or death to the unborn baby if youtake the medicine during your second or third trimester. You should not breastfeed while using this medicine. How should I take sacubitril and valsartan? Follow all directions on your prescription label and read all medication guides or instruction sheets. Your doctor may occasionally change your dose. Use themedicine exactly as directed. You may take this medicine with or without food. Sacubitril and valsartan doses are based on weight in children. Your child'sdose needs may change if the child gains or loses weight. If you cannot swallow a tablet whole, a pharmacist can make an oral suspension (liquid). Tell the doctor if theperson taking this medicine has trouble swallowing the tablet. Shake the oral suspension (liquid) before you measure a dose. Use the dosing syringe provided, or use amedicine dose-measuring device (not a kitchen spoon). Your blood pressure will need to be checked often. Your kidney function mayalso need to be checked. Store at room temperature away from moisture and heat. Throw away any oral suspension not used within 15 days after it was mixed. Do not keep the oral suspension jacinto refrigerator. What happens if I miss a dose? Take the medicine as soon as you can, but skip the missed dose if it is almost time for your next dose. Do not take two doses at one time. What happens if I overdose?   Seek emergency medical attention or call the Poison Help line at [phone]. What should I avoid while taking sacubitril and valsartan? Do not use potassium supplements or salt substitutes, unless your doctor hastold you to. Avoid getting up too fast from a sitting or lying position, or you may feeldizzy. What are the possible side effects of sacubitril and valsartan? Get emergency medical help if you have signs of an allergic reaction: hives; difficulty breathing; swelling of your face, lips, tongue, or throat. You may be more likely to have an allergic reaction if you are -American. Also call your doctor at once if you have:  a light-headed feeling, like you might pass out;  extreme tiredness;  high potassium --slow heart rate, weak pulse, muscle weakness, tingly feeling; or  kidney problems --little or no urination, rapid weight gain, painful or difficult urination, swelling in your hands, feet, or ankles. Common side effects may include:  kidney problems;  high potassium;  dizziness, feeling light-headed; or  cough. This is not a complete list of side effects and others may occur. Call your doctor for medical advice about side effects. You may report side effects toFDA at 5-591-OMR-[phone]. What other drugs will affect sacubitril and valsartan? Tell your doctor about all your other medicines, especially:  aliskiren;  lithium;  any other heart or blood pressure medicines;  a diuretic or \"water pill\";  medicine or mineral supplements that contain potassium; or  NSAIDs (nonsteroidal anti-inflammatory drugs) --aspirin, ibuprofen (Advil, Motrin), naproxen (Aleve), celecoxib, diclofenac, indomethacin, meloxicam, and others. This list is not complete. Other drugs may affect sacubitril and valsartan, including prescription and over-the-counter medicines, vitamins, and herbalproducts. Not all possible drug interactions are listed here. Where can I get more information? Your pharmacist can provide more information about sacubitril and valsartan.   Remember, keep this of this information.

## 2022-08-15 NOTE — RESULT ENCOUNTER NOTE
Reviewed. Consent (Marginal Mandibular)/Introductory Paragraph: The rationale for Mohs was explained to the patient and consent was obtained. The risks, benefits and alternatives to therapy were discussed in detail. Specifically, the risks of damage to the marginal mandibular branch of the facial nerve, infection, scarring, bleeding, prolonged wound healing, incomplete removal, allergy to anesthesia, and recurrence were addressed. Prior to the procedure, the treatment site was clearly identified and confirmed by the patient. All components of Universal Protocol/PAUSE Rule completed.

## 2022-08-23 ENCOUNTER — NURSE ONLY (OUTPATIENT)
Dept: CARDIOLOGY CLINIC | Age: 67
End: 2022-08-23
Payer: COMMERCIAL

## 2022-08-23 DIAGNOSIS — Z45.02 ICD (IMPLANTABLE CARDIOVERTER-DEFIBRILLATOR) BATTERY DEPLETION: ICD-10-CM

## 2022-08-23 DIAGNOSIS — I42.8 NON-ISCHEMIC CARDIOMYOPATHY (HCC): ICD-10-CM

## 2022-08-23 DIAGNOSIS — I50.22 CHRONIC SYSTOLIC HEART FAILURE (HCC): Primary | ICD-10-CM

## 2022-08-23 PROCEDURE — 93297 REM INTERROG DEV EVAL ICPMS: CPT | Performed by: CLINICAL NURSE SPECIALIST

## 2022-08-23 PROCEDURE — G2066 INTER DEVC REMOTE 30D: HCPCS | Performed by: CLINICAL NURSE SPECIALIST

## 2022-08-23 NOTE — PROGRESS NOTES
Remote transmission received from patient's CRT-D monitor at home. Transmission shows normal sensing and pacing function. Known hx elevated LV threshold w Adaptive output at max 3.0V/1.0ms. No new arrhythmias/events. Ap 67.8%  BiVp 99.2%, Effective 81.4% (known and suspect d/t possible intermittent loss of LV capture w output at threshold), VSRp <0.1%  PVCs 17.9 p/hr    Optivol is WNL. TriageHF Heart Failure Risk Status on 23-Aug-2022 is Medium*    NP to review. See interrogation under cardiology tab in the [address] [place] [address] [address] field for more details. Will continue to monitor remotely. (End of 31-day monitoring period 8/23/22).

## 2022-09-12 NOTE — PROGRESS NOTES
[address]  9/15/22  Referring:  Brandy Merida APRN- CNS    REASON FOR CONSULT/CHIEF COMPLAINT/HPI     Reason for visit/ Chief complaint   History of Tetralogy of Fallot  with s/p repair   HPI Kye Valenzuela is a 69 y.o. female, history of Tetrology of Fallot s/p repair in 1963 with a Jazmin-Taussig shunt and VSD howie patch. She had an AICD placed for cardiomyopathy with EF 25% in 2008. This was eventually upgraded to a biV ICD. LHC/RHC done at [facility] in 2015 showed normal coronaries and no evidence of constrction. LVEF 40% by LV gram at that time. She has severe COPD and restrictive lung disease, follows with Dr. Todd Crouch for this. She sees [facility] every 2 years for her ACHD and most recently saw Nadia Adams in 8/2021. Overall she is NYHA II. CPEX in 2016 showed a VO2 max of 13 ml/kg/min. Despite all this she still works full time as an instructor at an [employer]. She enjoys a good quality of life. She has not had any ICD shocks. No dizziness or chest pain. No leg swelling. No orthopnea. Can walk up 1 flight of stairs with only slight dyspnea but can't make it up two flights. She has really not noticed a difference since she was changed to a dual pacemaker. She had an echo in July which shows moderately dilated RV. Bobbetta Reading shows increased impedence in May and July but back to normal now. Her initial TOF repair was at age 6 by Dr. Coleridge Pallas at [facility]. Both Jazmin-Taussig shunts ligated. Extreme infundibular stenosis. Normal cors noted. Repeated aortic cross-clamps noted, none exceeding 12 mins. Transverse incision into RV. Infundibulum extensively resected from PA side as impossible to identify from below. Marked aortic dextroposition noted. Large VSD closed with howie patch. No TAP. Initially CHB.      Patient is adherent with medications and is tolerating them well without side effects     HISTORY/ALLERGIES/ROS MedHx:  has a past medical history of Bradycardia, Hyperlipidemia, Hypertension, SINTIA treated with BiPAP, Stroke, hemorrhagic ([address] and Type II or unspecified type diabetes mellitus without mention of complication, not stated as uncontrolled. SurgHx:  has a past surgical history that includes Cardiac defibrillator placement; Ovary removal; Cardiac catheterization (5/2015); Cholecystectomy; Cardiac defibrillator placement (8/26/15); and Cardiac defibrillator placement (Left, 10/2020). SocHx:  reports that she has never smoked. She has never used smokeless tobacco. She reports current alcohol use. She reports that she does not use drugs. FamHx: No family history of premature coronary artery disease, sudden death, or aneurysm  Allergies: Lisinopril     MEDICATIONS      Prior to Admission medications    Medication Sig Start Date End Date Taking? Authorizing Provider   sacubitril-valsartan (ENTRESTO) 49-51 MG per tablet Take 1 tablet by mouth 2 times daily 9/15/22  Yes Corey Del Real MD   empagliflozin (JARDIANCE) 10 MG tablet Take 1 tablet by mouth daily 9/15/22  Yes Corey Del Real MD   spironolactone (ALDACTONE) 25 MG tablet Take 1 tablet by mouth daily 9/15/22  Yes Corey Del Real MD   Multiple Vitamins-Minerals (ICAPS AREDS 2 PO) Take by mouth   Yes Historical Provider, MD   furosemide (LASIX) 40 MG tablet Take 1 tablet by mouth 2 times daily 7/5/22  Yes JEYSON Chavez - CNS   doxycycline hyclate (VIBRAMYCIN) 100 MG capsule Take one per day for 10 days and then take one every M-W-F after that.  3/4/22  Yes Yonatan Chatman MD   Fluticasone furoate-vilanterol (BREO ELLIPTA) 200-25 MCG/INH AEPB inhaler INHALE ONE DOSE BY MOUTH DAILY 12/17/21  Yes Yonatan Chatman MD   SPIRIVA RESPIMAT 2.5 MCG/ACT AERS inhaler INHALE TWO PUFFS BY MOUTH DAILY 12/17/21  Yes Yonatan Chatman MD   carvedilol (COREG) 12.5 MG tablet TAKE ONE TABLET BY MOUTH TWICE A DAY 12/15/21  Yes JEYSON Pimentel - tissue from prior congenital heart surgery. Overall left ventricular systolic function appears globally depressed. Normal left ventricular size. Unable to estimate LVEF due to inadequate endocardial border definition. RV not well visualized but appears moderately dilated with at least mildly depressed function. Indeterminate diastolic function. Mild TR with normal RVSP. No other obvious valve abnormalities, but Doppler exam is very limited. Recommend repeat limited echo with Definity contrast to evaluate LVEF versus other imaging modality. The mitral valve appears structurally normal. Mild mitral regurgitation. Dilated left atrium with increased left atrial volume. Aortic valve appears sclerotic but opens adequately. Mild aortic regurgitation. The right ventricle is moderately enlarged. Right ventricular systolic function is moderately reduced . TAPSE= 1.98 RV S'= 8.84  Pacer / ICD wire is visualized in the right ventricle. Mild tricuspid regurgitation. Systolic pulmonary artery pressure (SPAP) estimated at 33 mmHg (RA pressure 3 mmHg). The right atrial size is normal.  Tetraology of Fallot Repair noted     Echo at [facility] 8/10/2021  1. Tetralogy of Fallot       - s/p repair with non trans-annular patch infundibular resection (5/12/1963, [facility]). 2. Limited echocardiographic windows. Unable to accurately quantify ventricular function. Recommend cross-sectional imaging. 3. Paradoxical interventricular septum motion. 4. Mild tricuspid valve regurgitation. 5. Dilated right atrium. 6. Right ventricle is borderline dilated and the systolic function is moderately diminished. 7. Left ventricle is mildly dilated and the systolic function is qualitatively low normal.    8. Echogenic density present in the innominate vein with turbulent flow which may be associated with lead. 9. There is no significant pericardial effusion.    10. Compared to the previous echocardiogram of 8/8/2019, there is no significant change. GXT Stress:[facility] 4/4/16  An exercise test was performed using treadmill exercise with a Ramp protocol (modified) and Oximetry. The maximal oxygen consumption was less than   expected . The max VO2 was 13 ml/kg/min. The heart rhythm was AV sequentially paced with frequent predominantly uniform premature ventricular   contractions before, during, and after exercise and a multiform ventricular couplet during exercise. The atrial mechanism during exercise  is unclear. There is complete ventricular capture and sensing when appropriate. There were no pathologic ST segment or T wave changes . Pulse oximetry was   abnormal with 96% before, 92% during and immediately post, and 96% one minute, three minutes, and five minutes post exercise. No symptoms were   reported . Cath: [facility]  1. Left main coronary artery was free of any angiographically   significant disease. It gave of the left anterior descending   artery and the left circumflex artery. 2. The left anterior descending had a normal anatomic course. Large caliber vessel. No diagonals were noted but there was no   suggestion of occlusion or stenosis of the diagonals     3. The left circumflex artery gives off obtuse marginals and it   was free of any angiographically significant disease. 4. The right coronary artery had a slightly higher anterior take   off,  gave rise to posterior descending artery and posterolateral   branches distally. It was free of any angiographically   significant disease. LV gram appears to show an LVEF of 40%. A small apical   diverticulum was appreciated   There was small leak across the Ventricular patch     RV gram showed mild tricuspid regurgitation with RV function   moderately reduced around 30%     Aneursymal proximal pulmonary artery.      HEMODYNAMICS:   LVEDP: 15   LVEF:40%   RVEF30%   No gradient across the aortic valve     Gradient across the pulmonic valve : - peak gradient was 8 mm Hg   - mean gradient was 6 mm Hg     FINAL DIAGNOSIS(ES): or CONCLUSION:   Mildly elevated filling pressures without any significant LV to   RV interaction   Biventricular failure   Mild Transventicular patch leak, shunt was calculated to be not   significant at 0.94   Moderate transpulmonic valve gradient. Narrowing of the pulmonic annulus, with no signficant stenosis of   the pulmonic valve appreciated     Dr. Tiffany Anna and Cem Morataya  were present during all critical and   key portions of the procedure and was available to furnish   services during the entire procedure. [facility]/Medical Decision Making    Outside/Care everywhere records Reviewed  Labs Reviewed  Prior Imaging, ekg, cath, echo reviewed when available  Medications reviewed  Old Notes reviewed  ASSESSMENT AND PLAN     Tetralogy of Fallot s/p repair, murmur consistent with PS versus sub PS  Plan:  Murmur sounds consistent with a peak gradient of at least 30-40, higher than what was reported on echo  I doubt that her echo windows are adequate to evaluate this accurately. Definitive imaging of this would be via cath with intracardiac echocardiography (ICE)  However, this murmur doesn't sound consistent with severe PS, and it is unlikely that alleviating this would make her heart failure better than what we can achieve with just medical management. She doesn't have significant pulmonary regurgitation. Ideally we would get an MRI but that would be difficult to do given her biV ICD. For now will check labs, titrate meds, and plan to repeat auscultation with a low threshold to repeat echo in the near future.       2.Chronic Systolic Heart Failure   On ARB, BB, Lasix  Plan:   Discussed goal directed medical therapy for systolic heart failure at length with patient    She is not on Entresto, MRA, or SGTL2    Plan  Switch ARB to  Bipin Mclean 49/51, uptitrate as tolerated  Continue coreg, low threshold to switch to toprol xl if needed in order to optimize Entresto dose  Start Jardiance 10 mg po daily  Start spironolactione 25 mg po daily  New BNP today for new baseline     Reevaluate exam and labs next month    3. ICD   Due to heart failure and wide paced QRS on 8/26/2015 she had Biv upgrade of her device. Plan:Follows Dr Arnold Loyd     4. SINTIA treated with Bipap   Patient counseled on lifestyle modification, diet, and exercise. 5. Type 2 DM   8/17/22 A1c 7.1  Metformin  Adding Jardiance      Follow Up: 1 month    Jojo Mcgee MD  Cardiologist [facility]    Scribe's Attestation: This note was scribed in the presence of Dr. Severiano Griffin MD by Kieran Melendrez RN. 09/15/22     Physician Attestation  The scribe wrote this note in the presence of me Jojo Mcgee MD). The scribe may have prepopulated components of this note with my historical  intellectual property under my direct supervision. Any additions to this intellectual property were performed in my presence and at my direction. Furthermore, the content and accuracy of this note have been reviewed by me with edits by me as needed. Jojo Mcgee MD 9/15/2022 10:23 AM    Time spent: 70 minutes; 40 minutes face-to-face with the patient interviewing and examining and providing counseling/education about various medical issues, remainder pre/post chart preparation time including reviewing numerous medical records about her CHD, recent progress notes, diagnostic studies, and laboratory results, as well as placing new orders and coordinating the patient's care.

## 2022-09-14 PROBLEM — E11.9 TYPE 2 DIABETES MELLITUS WITHOUT COMPLICATION, WITHOUT LONG-TERM CURRENT USE OF INSULIN (HCC): Status: ACTIVE | Noted: 2022-09-14

## 2022-09-15 ENCOUNTER — OFFICE VISIT (OUTPATIENT)
Dept: CARDIOLOGY CLINIC | Age: 67
End: 2022-09-15
Payer: COMMERCIAL

## 2022-09-15 VITALS
HEART RATE: 87 BPM | WEIGHT: 232.4 LBS | HEIGHT: 66 IN | OXYGEN SATURATION: 94 % | BODY MASS INDEX: 37.35 KG/M2 | DIASTOLIC BLOOD PRESSURE: 76 MMHG | SYSTOLIC BLOOD PRESSURE: 110 MMHG

## 2022-09-15 DIAGNOSIS — Z95.810 IMPLANTABLE CARDIOVERTER-DEFIBRILLATOR (ICD) IN SITU: Chronic | ICD-10-CM

## 2022-09-15 DIAGNOSIS — G47.33 OSA TREATED WITH BIPAP: Chronic | ICD-10-CM

## 2022-09-15 DIAGNOSIS — Z87.74 HISTORY OF TETRALOGY OF FALLOT REPAIR: ICD-10-CM

## 2022-09-15 DIAGNOSIS — I50.22 CHRONIC SYSTOLIC HEART FAILURE (HCC): Primary | ICD-10-CM

## 2022-09-15 DIAGNOSIS — E11.9 TYPE 2 DIABETES MELLITUS WITHOUT COMPLICATION, WITHOUT LONG-TERM CURRENT USE OF INSULIN (HCC): ICD-10-CM

## 2022-09-15 PROCEDURE — 99417 PROLNG OP E/M EACH 15 MIN: CPT | Performed by: INTERNAL MEDICINE

## 2022-09-15 PROCEDURE — 99215 OFFICE O/P EST HI 40 MIN: CPT | Performed by: INTERNAL MEDICINE

## 2022-09-15 PROCEDURE — 1123F ACP DISCUSS/DSCN MKR DOCD: CPT | Performed by: INTERNAL MEDICINE

## 2022-09-15 RX ORDER — SPIRONOLACTONE 25 MG/1
25 TABLET ORAL DAILY
Qty: 90 TABLET | Refills: 3 | Status: SHIPPED | OUTPATIENT
Start: 2022-09-15

## 2022-09-15 NOTE — PATIENT INSTRUCTIONS
Stop Losartan 100 mg daily  Start Entresto 49/51 twice daily  Start Jardiance 10 mg daily  Start Spironolactone 25 mg daily  Labs CBC, BNP, BMP, TSH  Follow up appointment 1 month

## 2022-09-16 DIAGNOSIS — I50.22 CHRONIC SYSTOLIC HEART FAILURE (HCC): ICD-10-CM

## 2022-09-16 DIAGNOSIS — Z87.74 HISTORY OF TETRALOGY OF FALLOT REPAIR: ICD-10-CM

## 2022-09-16 LAB — PRO-BNP: 640 PG/ML (ref 0–124)

## 2022-09-21 ENCOUNTER — TELEPHONE (OUTPATIENT)
Dept: CARDIOLOGY CLINIC | Age: 67
End: 2022-09-21

## 2022-09-21 NOTE — TELEPHONE ENCOUNTER
----- Message from Jojo Mcgee MD sent at 9/21/2022  8:43 AM EDT -----  BNP is the same as it was 4 months ago, so we have a new baseline  We just started Entresto so no need to make changes at this point, will just recheck labs at next visit

## 2022-09-21 NOTE — TELEPHONE ENCOUNTER
Called patient and informed her that BNP was fine per [facility] note. No changes at this time. Will recheck labs at next visit. Pt verbalized understanding and was agreeable to plan.

## 2022-09-27 ENCOUNTER — NURSE ONLY (OUTPATIENT)
Dept: CARDIOLOGY CLINIC | Age: 67
End: 2022-09-27
Payer: COMMERCIAL

## 2022-09-27 DIAGNOSIS — Z45.02 ICD (IMPLANTABLE CARDIOVERTER-DEFIBRILLATOR) BATTERY DEPLETION: ICD-10-CM

## 2022-09-27 DIAGNOSIS — I50.22 CHRONIC SYSTOLIC HEART FAILURE (HCC): ICD-10-CM

## 2022-09-27 DIAGNOSIS — I42.9 PRIMARY CARDIOMYOPATHY (HCC): Primary | Chronic | ICD-10-CM

## 2022-09-27 PROCEDURE — G2066 INTER DEVC REMOTE 30D: HCPCS | Performed by: CLINICAL NURSE SPECIALIST

## 2022-09-27 PROCEDURE — 93297 REM INTERROG DEV EVAL ICPMS: CPT | Performed by: CLINICAL NURSE SPECIALIST

## 2022-09-27 NOTE — PROGRESS NOTES
Remote transmission received from patient's CRT-D monitor at home. Transmission shows normal sensing and pacing function. Known hx elevated LV threshold w Adaptive output at max 3.0V/1.0ms. No new arrhythmias/events. Ap 76.9%  BiVp 98.7%, Effective 74.2% (known and suspect d/t possible intermittent loss of LV capture w output at threshold)  VSRp <0.1%  PVCs 30.1 p/hr    Optivol is WNL. TriageHF Heart Failure Risk Status on 27-Sep-2022 is Medium*    NP to review. See interrogation under cardiology tab in the [address] [place] [address] [address] field for more details. Will continue to monitor remotely.     (End of 31-day monitoring period 9/27/22)

## 2022-09-29 NOTE — PROGRESS NOTES
[address]  10/5/22  Referring:  Imelda BENÍTEZ- [facility]    REASON FOR CONSULT/CHIEF COMPLAINT/HPI     Reason for visit/ Chief complaint   One Month Follow up appointment   HPI Ming Tejeda is a 69 y.o. female here for her 1 month follow up appointment. She has a history of Tetrology of Fallot s/p repair in 1963 with bilateral classic Jazmin-Taussig shunts and VSD howie patch. She had an AICD placed for cardiomyopathy with EF 25% in 2008. This was eventually upgraded to a biV ICD. LHC/RHC done at [facility] in 2015 showed normal coronaries and no evidence of constrction. LVEF 40% by LV gram at that time. She has severe COPD and restrictive lung disease, follows with Dr. Lindy Jarquin for this. At our last visit we changed ARB to [facility] and added Jardiance. She tolerated this well. She remains NYHA II. No leg swelling. She had an echo in July which shows moderately dilated RV. Nolon Laser shows increased impedence in May and July but back to normal now. Today she states she can't tell a difference since we started her on the Jardiance, Entresto and Aldactone. She denies any dizziness. Patient is adherent with medications and is tolerating them well without side effects     HISTORY/ALLERGIES/ROS     MedHx:  has a past medical history of Bradycardia, Hyperlipidemia, Hypertension, SINTIA treated with BiPAP, Stroke, hemorrhagic ([address] and Type II or unspecified type diabetes mellitus without mention of complication, not stated as uncontrolled. SurgHx:  has a past surgical history that includes Cardiac defibrillator placement; Ovary removal; Cardiac catheterization (5/2015); Cholecystectomy; Cardiac defibrillator placement (8/26/15); and Cardiac defibrillator placement (Left, 10/2020). SocHx:  reports that she has never smoked. She has never used smokeless tobacco. She reports current alcohol use. She reports that she does not use drugs.    FamHx: No family history of premature coronary artery disease, sudden death, or aneurysm  Allergies: Lisinopril     MEDICATIONS      Prior to Admission medications    Medication Sig Start Date End Date Taking? Authorizing Provider   furosemide (LASIX) 40 MG tablet TAKE ONE TABLET BY MOUTH TWICE A DAY 10/2/22  Yes JEYSON Pitt - CNP   sacubitril-valsartan (ENTRESTO) 49-51 MG per tablet Take 1 tablet by mouth 2 times daily 9/15/22  Yes Jihan Howard MD   empagliflozin (JARDIANCE) 10 MG tablet Take 1 tablet by mouth daily 9/15/22  Yes Jihan Howard MD   spironolactone (ALDACTONE) 25 MG tablet Take 1 tablet by mouth daily 9/15/22  Yes Jihan Howard MD   Multiple Vitamins-Minerals (ICAPS AREDS 2 PO) Take by mouth   Yes Historical Provider, MD   doxycycline hyclate (VIBRAMYCIN) 100 MG capsule Take one per day for 10 days and then take one every M-W-F after that.  3/4/22  Yes Susana Núñez MD   Fluticasone furoate-vilanterol (BREO ELLIPTA) 200-25 MCG/INH AEPB inhaler INHALE ONE DOSE BY MOUTH DAILY 12/17/21  Yes Susana Núñez MD   SPIRIVA RESPIMAT 2.5 MCG/ACT AERS inhaler INHALE TWO PUFFS BY MOUTH DAILY 12/17/21  Yes Susana Núñez MD   carvedilol (COREG) 12.5 MG tablet TAKE ONE TABLET BY MOUTH TWICE A DAY 12/15/21  Yes Alissa Olivarez APRN - CNS   albuterol sulfate HFA (PROVENTIL HFA) 108 (90 Base) MCG/ACT inhaler Inhale 2 puffs into the lungs every 4 hours as needed for Wheezing or Shortness of Breath 8/26/20  Yes Susana Núñez MD   Multiple Vitamins-Minerals (MULTIVITAMIN ADULT PO) Take by mouth   Yes Historical Provider, MD   fluticasone (FLONASE) 50 MCG/ACT nasal spray 2 sprays by Nasal route daily  Patient taking differently: 2 sprays by Nasal route daily as needed 7/14/16  Yes Teresa Calderón MD   BiPAP Machine MISC by Does not apply route   Yes Historical Provider, MD   metFORMIN (GLUCOPHAGE) 500 MG tablet Take 500 mg by mouth 2 times daily (with meals)    Yes Historical Provider, MD   atorvastatin (LIPITOR) 20 MG tablet Take 20 mg by mouth daily. Yes Historical Provider, MD   aspirin EC 81 MG EC tablet Take 1 tablet by mouth daily. 7/15/13  Yes Sabrina Rosales MD   acetaminophen (TYLENOL) 325 MG tablet Take 650 mg by mouth every 6 hours as needed. Yes Historical Provider, MD       PHYSICAL EXAM        Vitals:    10/05/22 1315   BP: 84/62 (right arm) 115/70 (right leg)   Pulse:    SpO2:       Weight: 224 lb (101.6 kg)     Gen Alert, cooperative, no distress Heart  Regular rate and rhythm, III/VI medium pitched NGOC loudest at LUSB. Diastole is quiet. Head Normocephalic, atraumatic, no abnormalities Abd  Soft, NT, +BS, no mass, no OM   Eyes PERRLA, conj/corn clear Ext  Ext nl, AT, no C/C, no edema   Nose Nares normal, no drain age, Non-tender Pulse 2+ and symmetric   Throat Lips, mucosa, tongue normal Skin Color/text/turg nl, no rash/lesions   Neck S/S, TM, NT, no bruit Psych Nl mood and affect   Lung CTA-B, unlabored, no DTP     Ch wall NT, no deform, bilateral BTT shunt scars       LABS and Imaging     Relevant and available CV data reviewed    EKG personally interpreted: AV paced    8/17/22 Lipid Panel   HDL 48 LDL 64  3/6/21 Lipid Panel   HDL 44 LDL 67    5/24/22 Pro       Echo 7/18/22  Summary  Very poor image quality likely due to scar tissue from prior congenital heart surgery. Overall left ventricular systolic function appears globally depressed. Normal left ventricular size. Unable to estimate LVEF due to inadequate endocardial border definition. RV not well visualized but appears moderately dilated with at least mildly depressed function. Indeterminate diastolic function. Mild TR with normal RVSP. No other obvious valve abnormalities, but Doppler exam is very limited. Recommend repeat limited echo with Definity contrast to evaluate LVEF versus other imaging modality. The mitral valve appears structurally normal. Mild mitral regurgitation.   Dilated left atrium with increased left atrial volume. Aortic valve appears sclerotic but opens adequately. Mild aortic regurgitation. The right ventricle is moderately enlarged. Right ventricular systolic function is moderately reduced . TAPSE= 1.98 RV S'= 8.84  Pacer / ICD wire is visualized in the right ventricle. Mild tricuspid regurgitation. Systolic pulmonary artery pressure (SPAP) estimated at 33 mmHg (RA pressure 3 mmHg). The right atrial size is normal.  Tetraology of Fallot Repair noted     [facility]  1. Tetralogy of Fallot       - s/p repair with non trans-annular patch infundibular resection (5/12/1963, [place]). 2. Limited echocardiographic windows. Unable to accurately quantify ventricular function. Recommend cross-sectional imaging. 3. Paradoxical interventricular septum motion. 4. Mild tricuspid valve regurgitation. 5. Dilated right atrium. 6. Right ventricle is borderline dilated and the systolic function is moderately diminished. 7. Left ventricle is mildly dilated and the systolic function is qualitatively low normal.    8. Echogenic density present in the innominate vein with turbulent flow which may be associated with lead. 9. There is no significant pericardial effusion. 10. Compared to the previous echocardiogram of 8/8/2019, there is no significant change. GXT Stress:[facility] 4/4/16  An exercise test was performed using treadmill exercise with a Ramp protocol (modified) and Oximetry. The maximal oxygen consumption was less than   expected . The max VO2 was 13 ml/kg/min. The heart rhythm was AV sequentially paced with frequent predominantly uniform premature ventricular   contractions before, during, and after exercise and a multiform ventricular couplet during exercise. The atrial mechanism during exercise  is unclear. There is complete ventricular capture and sensing when appropriate. There were no pathologic ST segment or T wave changes .  Pulse oximetry was   abnormal with 96% before, 92% during and immediately post, and 96% one minute, three minutes, and five minutes post exercise. No symptoms were   reported . Cath: [facility]  1. Left main coronary artery was free of any angiographically   significant disease. It gave of the left anterior descending   artery and the left circumflex artery. 2. The left anterior descending had a normal anatomic course. Large caliber vessel. No diagonals were noted but there was no   suggestion of occlusion or stenosis of the diagonals     3. The left circumflex artery gives off obtuse marginals and it   was free of any angiographically significant disease. 4. The right coronary artery had a slightly higher anterior take   off,  gave rise to posterior descending artery and posterolateral   branches distally. It was free of any angiographically   significant disease. LV gram appears to show an LVEF of 40%. A small apical   diverticulum was appreciated   There was small leak across the Ventricular patch     RV gram showed mild tricuspid regurgitation with RV function   moderately reduced around 30%     Aneursymal proximal pulmonary artery. HEMODYNAMICS:   LVEDP: 15   LVEF:40%   RVEF30%   No gradient across the aortic valve     Gradient across the pulmonic valve :   - peak gradient was 8 mm Hg   - mean gradient was 6 mm Hg     FINAL DIAGNOSIS(ES): or CONCLUSION:   Mildly elevated filling pressures without any significant LV to   RV interaction   Biventricular failure   Mild Transventicular patch leak, shunt was calculated to be not   significant at 0.94   Moderate transpulmonic valve gradient. Narrowing of the pulmonic annulus, with no signficant stenosis of   the pulmonic valve appreciated     Dr. Jennifer Montero and Amada Rodriguez  were present during all critical and   key portions of the procedure and was available to furnish   services during the entire procedure.          Moderate Risk  Moderate Complexity/Medical Decision Making    Outside/Care everywhere records Reviewed  Labs Reviewed  Prior Imaging, ekg, cath, echo reviewed when available  Medications reviewed  Old Notes reviewed  ASSESSMENT AND PLAN     Tetralogy of Fallot s/p repair, murmur consistent with PS versus sub PS  Plan:  Murmur sounds consistent with a peak gradient of at least 30-40, higher than what was reported on echo  I doubt that her echo windows are adequate to evaluate this accurately. Definitive imaging of this would be via cath with intracardiac echocardiography (ICE)  However, this murmur doesn't sound consistent with severe PS, and it is unlikely that alleviating this would make her heart failure better than what we can achieve with just medical management. She doesn't have significant pulmonary regurgitation. Ideally we would get an MRI but that would be difficult to do given her biV ICD. For now will check labs, titrate meds, and plan to repeat auscultation with a low threshold to repeat echo in the near future. 2.Chronic Systolic Heart Failure   5/10/29 Pro   On  BB, Lasix, Entresto, Jardiance, spironolactone   Plan:  tolerating medication well. She had bilateral BTT shunts so arm blood pressures on both sides are inaccurate and she needs to have leg pressures. Will make lasix daily instead of BID in order to get more room for Entresto  Increase Entresto by 50% to ~75/75 BID  Continue other meds    New labs today BNP and BMP    3. ICD   Due to heart failure and wide paced QRS on 8/26/2015 she had Biv upgrade of her device. Plan:Follows Dr Clarence Kent     4. SINTIA treated with Bipap   Patient counseled on lifestyle modification, diet, and exercise. 5. Type 2 DM   8/17/22 A1c 7.1  Metformin, Jardiance     Follow Up: 2 month    Dena Valles MD  Cardiologist [facility]    Scribe's Attestation:    This note was scribed in the presence of Dr. Genna Ormond, MD by Niharika Powell RN. 10/05/22     Physician Attestation  The scribe wrote this note in the presence of me Ricky Alfredo MD). The scribe may have prepopulated components of this note with my historical  intellectual property under my direct supervision. Any additions to this intellectual property were performed in my presence and at my direction. Furthermore, the content and accuracy of this note have been reviewed by me with edits by me as needed.   Ricky Alfredo MD 10/5/2022 2:07 PM

## 2022-09-30 NOTE — TELEPHONE ENCOUNTER
Prescription refill    Last OV:08/15/2022    Last Refill:07/05/2022    Labs:05/24/2022    Future Appt: none scheduled and patient is not on the recall list

## 2022-10-02 RX ORDER — FUROSEMIDE 40 MG/1
TABLET ORAL
Qty: 180 TABLET | Refills: 0 | Status: SHIPPED | OUTPATIENT
Start: 2022-10-02 | End: 2022-10-13 | Stop reason: SDUPTHER

## 2022-10-05 ENCOUNTER — OFFICE VISIT (OUTPATIENT)
Dept: CARDIOLOGY CLINIC | Age: 67
End: 2022-10-05
Payer: COMMERCIAL

## 2022-10-05 VITALS
BODY MASS INDEX: 36 KG/M2 | DIASTOLIC BLOOD PRESSURE: 62 MMHG | SYSTOLIC BLOOD PRESSURE: 84 MMHG | HEIGHT: 66 IN | HEART RATE: 105 BPM | WEIGHT: 224 LBS | OXYGEN SATURATION: 95 %

## 2022-10-05 DIAGNOSIS — Z87.74 TETRALOGY OF FALLOT S/P REPAIR: ICD-10-CM

## 2022-10-05 DIAGNOSIS — E11.9 TYPE 2 DIABETES MELLITUS WITHOUT COMPLICATION, WITHOUT LONG-TERM CURRENT USE OF INSULIN (HCC): ICD-10-CM

## 2022-10-05 DIAGNOSIS — I50.22 CHRONIC SYSTOLIC HEART FAILURE (HCC): ICD-10-CM

## 2022-10-05 DIAGNOSIS — I50.22 CHRONIC SYSTOLIC HEART FAILURE (HCC): Primary | ICD-10-CM

## 2022-10-05 DIAGNOSIS — Z45.02 ICD (IMPLANTABLE CARDIOVERTER-DEFIBRILLATOR) BATTERY DEPLETION: ICD-10-CM

## 2022-10-05 DIAGNOSIS — G47.33 OSA TREATED WITH BIPAP: Chronic | ICD-10-CM

## 2022-10-05 LAB
ANION GAP SERPL CALCULATED.3IONS-SCNC: 16 MMOL/L (ref 3–16)
BUN BLDV-MCNC: 16 MG/DL (ref 7–20)
CALCIUM SERPL-MCNC: 9.6 MG/DL (ref 8.3–10.6)
CHLORIDE BLD-SCNC: 98 MMOL/L (ref 99–110)
CO2: 24 MMOL/L (ref 21–32)
CREAT SERPL-MCNC: 1.2 MG/DL (ref 0.6–1.2)
GFR AFRICAN AMERICAN: 54
GFR NON-AFRICAN AMERICAN: 45
GLUCOSE BLD-MCNC: 192 MG/DL (ref 70–99)
POTASSIUM SERPL-SCNC: 4.2 MMOL/L (ref 3.5–5.1)
PRO-BNP: 232 PG/ML (ref 0–124)
SODIUM BLD-SCNC: 138 MMOL/L (ref 136–145)

## 2022-10-05 PROCEDURE — 1123F ACP DISCUSS/DSCN MKR DOCD: CPT | Performed by: INTERNAL MEDICINE

## 2022-10-05 PROCEDURE — 99214 OFFICE O/P EST MOD 30 MIN: CPT | Performed by: INTERNAL MEDICINE

## 2022-10-05 NOTE — PATIENT INSTRUCTIONS
Labs- BNP, BMP  Only take Lasix 40 mg once a day  Then start Entresto 1 and 1/2 pill daily  Follow up in a couple months

## 2022-10-11 ENCOUNTER — OFFICE VISIT (OUTPATIENT)
Dept: PULMONOLOGY | Age: 67
End: 2022-10-11
Payer: COMMERCIAL

## 2022-10-11 VITALS — HEART RATE: 80 BPM | DIASTOLIC BLOOD PRESSURE: 59 MMHG | OXYGEN SATURATION: 92 % | SYSTOLIC BLOOD PRESSURE: 121 MMHG

## 2022-10-11 DIAGNOSIS — G47.33 OSA TREATED WITH BIPAP: Chronic | ICD-10-CM

## 2022-10-11 DIAGNOSIS — I50.22 CHRONIC SYSTOLIC HEART FAILURE (HCC): ICD-10-CM

## 2022-10-11 DIAGNOSIS — J98.6 ELEVATED DIAPHRAGM: ICD-10-CM

## 2022-10-11 DIAGNOSIS — J98.4 RESTRICTIVE LUNG DISEASE: ICD-10-CM

## 2022-10-11 DIAGNOSIS — J44.9 COPD, SEVERE (HCC): Primary | Chronic | ICD-10-CM

## 2022-10-11 PROCEDURE — 1123F ACP DISCUSS/DSCN MKR DOCD: CPT | Performed by: INTERNAL MEDICINE

## 2022-10-11 PROCEDURE — 99214 OFFICE O/P EST MOD 30 MIN: CPT | Performed by: INTERNAL MEDICINE

## 2022-10-11 NOTE — PROGRESS NOTES
[facility]  Progress Note  Nissa Jackson MD       Brittnee Yu   YOB: 1955    Date of Visit:  10/11/2022    Assessment/Plan:  1. COPD, severe (HCC)/SOB  PFT 10/15:  Spirometry reveals decreased FVC at 1.95 L, which is 60% predicted. FEV1 isndecreased at 1.27 L, which is 49% predicted. FEV1/FVC ratio is decreased at 66%. There is no significant change after inhaled bronchodilators. Lung volumes reveal decreased total lung capacity at 71% predicted. Vital capacity is decreased at 60% predicted. Diffusion capacity is decreased at 53% predicted. IMPRESSION: Combined severe obstructive and moderate restrictive lung  disease    Medication Management:  The patient benefits from the medical regimen and reports no complications. [address]  Spiriva Respimat (Anoro made her hoarse)  Albuterol prn  Azithromycin MWF    2. Elevated diaphragm  Chronic  Stable    3. SINTIA treated with BiPAP  BiPAP  Stable    4. Cardiomyopathy ([address]  EF 25%  Has had some med adjustment that has helped  Sees Dr Adolfo Case her pacer replaced. 5. Restrictive lung disease  2/2 obesity    Recommend COVID vaccine  Flu shot given    FOLLOW UP: 6 months    HPI  The patient presents with a chief complaint of moderate to severe shortness of breath related to severe COPD of many years duration. She also has HFrEF and sees Dr Mark Jennings. She is starting Cardiac Rehab next week. Review of Systems  No Chest pain, Nausea or vomiting reported      Allergies   Allergen Reactions    Lisinopril      Cough     Prior to Visit Medications    Medication Sig Taking?  Authorizing Provider   furosemide (LASIX) 40 MG tablet TAKE ONE TABLET BY MOUTH TWICE A DAY  Shahla Huffman APRN - CNP   sacubitril-valsartan (ENTRESTO) 49-51 MG per tablet Take 1 tablet by mouth 2 times daily  Karin Cranker, MD   empagliflozin (JARDIANCE) 10 MG tablet Take 1 tablet by mouth daily  Karin Cranker, MD spironolactone (ALDACTONE) 25 MG tablet Take 1 tablet by mouth daily  Blake Isbell MD   Multiple Vitamins-Minerals (ICAPS AREDS 2 PO) Take by mouth  Historical Provider, MD   doxycycline hyclate (VIBRAMYCIN) 100 MG capsule Take one per day for 10 days and then take one every M-W-F after that. Torri Quach MD   Fluticasone furoate-vilanterol (BREO ELLIPTA) 200-25 MCG/INH AEPB inhaler INHALE ONE DOSE BY MOUTH DAILY  Torri Quach MD   SPIRIVA RESPIMAT 2.5 MCG/ACT AERS inhaler INHALE TWO PUFFS BY MOUTH DAILY  Torri Quach MD   carvedilol (COREG) 12.5 MG tablet TAKE ONE TABLET BY MOUTH TWICE A DAY  JEYSON Sanchez - CNS   albuterol sulfate HFA (PROVENTIL HFA) 108 (90 Base) MCG/ACT inhaler Inhale 2 puffs into the lungs every 4 hours as needed for Wheezing or Shortness of Breath  Torri Quach MD   Multiple Vitamins-Minerals (MULTIVITAMIN ADULT PO) Take by mouth  Historical Provider, MD   fluticasone (FLONASE) 50 MCG/ACT nasal spray 2 sprays by Nasal route daily  Patient taking differently: 2 sprays by Nasal route daily as needed  Stephanie Jenkins MD   BiPAP Machine MISC by Does not apply route  Historical Provider, MD   metFORMIN (GLUCOPHAGE) 500 MG tablet Take 500 mg by mouth 2 times daily (with meals)   Historical Provider, MD   atorvastatin (LIPITOR) 20 MG tablet Take 20 mg by mouth daily. Historical Provider, MD   aspirin EC 81 MG EC tablet Take 1 tablet by mouth daily. Elmira Winters MD   acetaminophen (TYLENOL) 325 MG tablet Take 650 mg by mouth every 6 hours as needed. Historical Provider, MD       There were no vitals filed for this visit. There is no height or weight on file to calculate BMI.      Wt Readings from Last 3 Encounters:   10/05/22 224 lb (101.6 kg)   09/15/22 232 lb 6.4 oz (105.4 kg)   08/15/22 229 lb 6.4 oz (104.1 kg)     BP Readings from Last 3 Encounters:   10/05/22 84/62   09/15/22 110/76   08/15/22 130/83        Social History     Tobacco Use Smoking Status Never   Smokeless Tobacco Never       Physical Exam:  Well developed, well nourished  Alert and oriented  Sclera is clear  No cervical adenopathy  No JVD. Chest examination is clear. Cardiac examination reveals regular rate and rhythm without murmur, gallop or rub. The abdomen is soft, nontender and nondistended. There is no clubbing, cyanosis or edema of the extremities. There is no obvious skin rash.   No focal neuro deficicts  Normal mood and affect

## 2022-10-12 ENCOUNTER — TELEPHONE (OUTPATIENT)
Dept: CARDIOLOGY CLINIC | Age: 67
End: 2022-10-12

## 2022-10-12 NOTE — TELEPHONE ENCOUNTER
Pt called needing direction on how to take her med, Entresto 49-51mg and furosemide 40mg should she be taking them two together. Please call to discuss.   Thank you

## 2022-10-13 ENCOUNTER — HOSPITAL ENCOUNTER (OUTPATIENT)
Dept: CARDIAC REHAB | Age: 67
Setting detail: THERAPIES SERIES
Discharge: HOME | End: 2022-10-13
Payer: COMMERCIAL

## 2022-10-13 VITALS
WEIGHT: 224.5 LBS | DIASTOLIC BLOOD PRESSURE: 78 MMHG | BODY MASS INDEX: 36.08 KG/M2 | SYSTOLIC BLOOD PRESSURE: 100 MMHG | HEIGHT: 66 IN | HEART RATE: 70 BPM | RESPIRATION RATE: 18 BRPM

## 2022-10-13 PROCEDURE — 93798 PHYS/QHP OP CAR RHAB W/ECG: CPT

## 2022-10-13 RX ORDER — FUROSEMIDE 40 MG/1
TABLET ORAL
Qty: 180 TABLET | Refills: 0
Start: 2022-10-13

## 2022-10-13 RX ORDER — NYSTATIN 100000 U/G
OINTMENT TOPICAL PRN
COMMUNITY

## 2022-10-13 RX ORDER — NYSTATIN 100000 [USP'U]/G
POWDER TOPICAL PRN
COMMUNITY

## 2022-10-13 ASSESSMENT — PATIENT HEALTH QUESTIONNAIRE - PHQ9
7. TROUBLE CONCENTRATING ON THINGS, SUCH AS READING THE NEWSPAPER OR WATCHING TELEVISION: 0
SUM OF ALL RESPONSES TO PHQ9 QUESTIONS 1 & 2: 0
3. TROUBLE FALLING OR STAYING ASLEEP: 0
10. IF YOU CHECKED OFF ANY PROBLEMS, HOW DIFFICULT HAVE THESE PROBLEMS MADE IT FOR YOU TO DO YOUR WORK, TAKE CARE OF THINGS AT HOME, OR GET ALONG WITH OTHER PEOPLE: 0
1. LITTLE INTEREST OR PLEASURE IN DOING THINGS: 0
9. THOUGHTS THAT YOU WOULD BE BETTER OFF DEAD, OR OF HURTING YOURSELF: 0
6. FEELING BAD ABOUT YOURSELF - OR THAT YOU ARE A FAILURE OR HAVE LET YOURSELF OR YOUR FAMILY DOWN: 0
4. FEELING TIRED OR HAVING LITTLE ENERGY: 0
2. FEELING DOWN, DEPRESSED OR HOPELESS: 0
SUM OF ALL RESPONSES TO PHQ QUESTIONS 1-9: 0
8. MOVING OR SPEAKING SO SLOWLY THAT OTHER PEOPLE COULD HAVE NOTICED. OR THE OPPOSITE, BEING SO FIGETY OR RESTLESS THAT YOU HAVE BEEN MOVING AROUND A LOT MORE THAN USUAL: 0
SUM OF ALL RESPONSES TO PHQ QUESTIONS 1-9: 0
5. POOR APPETITE OR OVEREATING: 0
SUM OF ALL RESPONSES TO PHQ QUESTIONS 1-9: 0
SUM OF ALL RESPONSES TO PHQ QUESTIONS 1-9: 0

## 2022-10-13 NOTE — TELEPHONE ENCOUNTER
I spoke to [place]. She was at cardiac rehab earlier, B/P 104/60 -- attends MWF, and they keep an eye on her blood pressure and weight. She will take Entresto 1.5 tabs bid and hold Lasix for now. I advised her to watch for swelling and dizziness, to let us know next week if symptoms are not better or worsen. She did say she only had one 'bad\" day of dizziness the other day. Med list updated.

## 2022-10-13 NOTE — TELEPHONE ENCOUNTER
Tried calling Elvira Lance back to ask when she becomes light-headed (such as after taking her meds) and if her B/P drops. I LMOM asking her to call back with this information but said I would send the message on. Dr. Axel Fall, [facility] to take [facility] 3xday? Does she need the Lasix? Thanks.

## 2022-10-13 NOTE — CARDIO/PULMONARY
[facility] Cardiac Rehabilitation Initial Evaluation    Chaitanya Sánchez       1955     [id number]    Share medical information:  Yes Elisa Avery () [phone]  Summer Hughes (daughter in law) [phone]    Cardiac History    EF:  40%  CHF - last admission:  never    Physical Assessment     General Appearance   Height:  5' 6\" (167.6 cm)  Weight:  224 lb 8 oz (101.8 kg)   BMI:  36.3  Skin color:         Cardiovascular Assessment  BP Sittin/78  Sittin/62 (Left arm)  Standin/60 (Right arm)  Heart rate:   70 Telemetry   Heart sounds: Exceptions to WDL   S1, S2, Distant/Muffled    Respiratory Assessment  Resp rate: 18                  SpO2:     Quality/Effort:      Sleep Apnea:  Yes  CPAP  Yes bipap  Oxygen  No     Sleeping Habits:  sleeps 7 hours a night     Edema:  No      Orthopedic/Exercise Limitations:  Yes uses walker PRN     Pain:   Do you have pain?:  no       Fall Risk Assessment     History of falling with or without injury: No  Use of ambulatory aid: Yes (walker at times)  Difficulty walking/impaired gait: Yes (somewhat)  Numbness in feet: No  Vision changes: No  Dizziness: No  Shortness of breath: No  Current medications include but not limited to: Anticoagulant, ACE, ARB, Beta  Blocker  Other fall risk : No  Outpatient fall risk intervention strategies: None of these strategies apply    Abuse / Neglect  Physical/behavioral signs of abuse/neglect   No    Do you feel safe at home   Yes    Advanced Directives  Patient has Advanced Directives:  No  Patient given Advanced Directive pack:  Yes    Vaccinations  Influenza (annual): No  Pneumonia:  No  Covid: 1 vaccine and 2 boosters    Pt here for first session of Cardiac Rehab. Reviewed and discussed insurance benefits, pt v/u. Reviewed Cardiac Rehab Routine and RPE scale, pt v/u. Developed individual treatment plan and goals set with patient; pt in agreement with plan and no further questions at this time. Performed six minute walk test.Next visit scheduled for  Monday 10/17/22 at 4 pm.    Sherrie Fountain RN  10/13/2022

## 2022-10-17 ENCOUNTER — HOSPITAL ENCOUNTER (OUTPATIENT)
Dept: CARDIAC REHAB | Age: 67
Setting detail: THERAPIES SERIES
Discharge: HOME | End: 2022-10-17
Payer: COMMERCIAL

## 2022-10-17 NOTE — PROGRESS NOTES
Pt called and stated would prefer to start cardiac rehab on Friday 10/21/22 instead of 10/17/22 due to schedule conflict.

## 2022-10-18 RX ORDER — DOXYCYCLINE HYCLATE 100 MG/1
CAPSULE ORAL
Qty: 20 CAPSULE | Refills: 5 | Status: SHIPPED | OUTPATIENT
Start: 2022-10-18

## 2022-10-19 ENCOUNTER — APPOINTMENT (OUTPATIENT)
Dept: CARDIAC REHAB | Age: 67
End: 2022-10-19
Payer: COMMERCIAL

## 2022-10-21 ENCOUNTER — HOSPITAL ENCOUNTER (OUTPATIENT)
Dept: CARDIAC REHAB | Age: 67
Setting detail: THERAPIES SERIES
Discharge: HOME | End: 2022-10-21
Payer: COMMERCIAL

## 2022-10-21 PROCEDURE — 93798 PHYS/QHP OP CAR RHAB W/ECG: CPT

## 2022-10-24 ENCOUNTER — HOSPITAL ENCOUNTER (OUTPATIENT)
Dept: CARDIAC REHAB | Age: 67
Setting detail: THERAPIES SERIES
Discharge: HOME | End: 2022-10-24
Payer: COMMERCIAL

## 2022-10-24 PROCEDURE — 93798 PHYS/QHP OP CAR RHAB W/ECG: CPT

## 2022-10-26 ENCOUNTER — HOSPITAL ENCOUNTER (OUTPATIENT)
Dept: CARDIAC REHAB | Age: 67
Setting detail: THERAPIES SERIES
Discharge: HOME | End: 2022-10-26
Payer: COMMERCIAL

## 2022-10-26 PROCEDURE — 93798 PHYS/QHP OP CAR RHAB W/ECG: CPT

## 2022-10-28 ENCOUNTER — HOSPITAL ENCOUNTER (OUTPATIENT)
Dept: CARDIAC REHAB | Age: 67
Setting detail: THERAPIES SERIES
Discharge: HOME | End: 2022-10-28
Payer: COMMERCIAL

## 2022-10-28 PROCEDURE — 93798 PHYS/QHP OP CAR RHAB W/ECG: CPT

## 2022-10-31 ENCOUNTER — HOSPITAL ENCOUNTER (OUTPATIENT)
Dept: CARDIAC REHAB | Age: 67
Setting detail: THERAPIES SERIES
Discharge: HOME | End: 2022-10-31
Payer: COMMERCIAL

## 2022-10-31 PROCEDURE — 93798 PHYS/QHP OP CAR RHAB W/ECG: CPT

## 2022-11-01 ENCOUNTER — NURSE ONLY (OUTPATIENT)
Dept: CARDIOLOGY CLINIC | Age: 67
End: 2022-11-01
Payer: COMMERCIAL

## 2022-11-01 DIAGNOSIS — I42.9 PRIMARY CARDIOMYOPATHY (HCC): Primary | Chronic | ICD-10-CM

## 2022-11-01 DIAGNOSIS — Z45.02 ICD (IMPLANTABLE CARDIOVERTER-DEFIBRILLATOR) BATTERY DEPLETION: ICD-10-CM

## 2022-11-01 DIAGNOSIS — I50.22 CHRONIC SYSTOLIC HEART FAILURE (HCC): ICD-10-CM

## 2022-11-01 PROCEDURE — 93295 DEV INTERROG REMOTE 1/2/MLT: CPT | Performed by: INTERNAL MEDICINE

## 2022-11-01 PROCEDURE — 93296 REM INTERROG EVL PM/IDS: CPT | Performed by: INTERNAL MEDICINE

## 2022-11-01 PROCEDURE — 93297 REM INTERROG DEV EVAL ICPMS: CPT | Performed by: NURSE PRACTITIONER

## 2022-11-01 NOTE — PROGRESS NOTES
Remote transmission received from patient's CRT-D monitor at home. Transmission shows normal sensing and pacing function. Known hx elevated LV threshold w Adaptive output at max 3.0V/1.0ms. No new arrhythmias/events. Ap 54.4%  BiVp 99.5%, Effective 80.0%, VSRp <0.1%  PVCs 9.3 p/hr    Optivol is WNL. TriageHF Heart Failure Risk Status on 01-Nov-2022 is low    EP/ NP to review. See interrogation under cardiology tab in the [address] [place] [address] [address] field for more details. Will continue to monitor remotely. (End of 91-day monitoring period 11/1/22).

## 2022-11-02 ENCOUNTER — HOSPITAL ENCOUNTER (OUTPATIENT)
Dept: CARDIAC REHAB | Age: 67
Setting detail: THERAPIES SERIES
Discharge: HOME | End: 2022-11-02
Payer: COMMERCIAL

## 2022-11-02 PROCEDURE — 93798 PHYS/QHP OP CAR RHAB W/ECG: CPT

## 2022-11-04 ENCOUNTER — HOSPITAL ENCOUNTER (OUTPATIENT)
Dept: CARDIAC REHAB | Age: 67
Setting detail: THERAPIES SERIES
Discharge: HOME | End: 2022-11-04
Payer: COMMERCIAL

## 2022-11-04 PROCEDURE — 93798 PHYS/QHP OP CAR RHAB W/ECG: CPT

## 2022-11-07 ENCOUNTER — APPOINTMENT (OUTPATIENT)
Dept: CARDIAC REHAB | Age: 67
End: 2022-11-07
Payer: COMMERCIAL

## 2022-11-09 ENCOUNTER — APPOINTMENT (OUTPATIENT)
Dept: CARDIAC REHAB | Age: 67
End: 2022-11-09
Payer: COMMERCIAL

## 2022-11-11 ENCOUNTER — APPOINTMENT (OUTPATIENT)
Dept: CARDIAC REHAB | Age: 67
End: 2022-11-11
Payer: COMMERCIAL

## 2022-11-14 ENCOUNTER — HOSPITAL ENCOUNTER (OUTPATIENT)
Dept: CARDIAC REHAB | Age: 67
Setting detail: THERAPIES SERIES
Discharge: HOME | End: 2022-11-14
Payer: COMMERCIAL

## 2022-11-14 PROCEDURE — 93798 PHYS/QHP OP CAR RHAB W/ECG: CPT

## 2022-11-18 ENCOUNTER — HOSPITAL ENCOUNTER (OUTPATIENT)
Dept: CARDIAC REHAB | Age: 67
Setting detail: THERAPIES SERIES
Discharge: HOME | End: 2022-11-18
Payer: COMMERCIAL

## 2022-11-18 PROCEDURE — 93798 PHYS/QHP OP CAR RHAB W/ECG: CPT

## 2022-11-21 ENCOUNTER — HOSPITAL ENCOUNTER (OUTPATIENT)
Dept: CARDIAC REHAB | Age: 67
Setting detail: THERAPIES SERIES
Discharge: HOME | End: 2022-11-21
Payer: COMMERCIAL

## 2022-11-21 PROCEDURE — 93798 PHYS/QHP OP CAR RHAB W/ECG: CPT

## 2022-11-23 ENCOUNTER — HOSPITAL ENCOUNTER (OUTPATIENT)
Dept: CARDIAC REHAB | Age: 67
Setting detail: THERAPIES SERIES
Discharge: HOME | End: 2022-11-23
Payer: COMMERCIAL

## 2022-11-23 PROCEDURE — 93798 PHYS/QHP OP CAR RHAB W/ECG: CPT

## 2022-11-28 ENCOUNTER — HOSPITAL ENCOUNTER (OUTPATIENT)
Dept: CARDIAC REHAB | Age: 67
Setting detail: THERAPIES SERIES
Discharge: HOME | End: 2022-11-28
Payer: COMMERCIAL

## 2022-11-28 PROCEDURE — 93798 PHYS/QHP OP CAR RHAB W/ECG: CPT

## 2022-11-29 NOTE — PROGRESS NOTES
[address]  12/5/22  Referring:  David Perez APRN- CNS    REASON FOR CONSULT/CHIEF COMPLAINT/HPI     Reason for visit/ Chief complaint   One Month Follow up appointment   HPI Anne Ugarte is a 69 y.o. female here for her 1 month follow up appointment. She has a history of Tetrology of Fallot s/p repair in 1963 with bilateral classic Jazmin-Taussig shunts and VSD howie patch. She had an AICD placed for cardiomyopathy with EF 25% in 2008. This was eventually upgraded to a biV ICD. LHC/RHC done at [facility] in 2015 showed normal coronaries and no evidence of constrction. LVEF 40% by LV gram at that time. She has severe COPD and restrictive lung disease, follows with Dr. Kiko Lei for this. At our last visit we changed ARB to [facility] and added Jardiance. She tolerated this well. She remains NYHA II. No leg swelling. She had an echo in July which shows moderately dilated RV. Juliette Amezcuaa shows increased impedence in May and July but back to normal now. She previously reported that she can't tell a difference since we started her on the Jardiance, Entresto and Aldactone. She denies any dizziness. Today she states she is doing okay. She has a yeast infection again x 3 since starting the Jardiance. She denies CP, SOB, palpitations and dizziness. She has been in cardiac rehab but is going on vacation soon and it wont carry over to January so she may want to complete it early. She feels it has helped getting her moving. Patient is adherent with medications and is tolerating them well without side effects     HISTORY/ALLERGIES/ROS     MedHx:  has a past medical history of Bradycardia, CHF (congestive heart failure) ([address] COPD (chronic obstructive pulmonary disease) ([address] Hyperlipidemia, Hypertension, SINTIA treated with BiPAP, Stroke, hemorrhagic ([address] and Type II or unspecified type diabetes mellitus without mention of complication, not stated as uncontrolled.   SurgHx:  has a past surgical history that includes Cardiac defibrillator placement; Ovary removal; Cardiac catheterization (05/2015); Cholecystectomy; Cardiac defibrillator placement (08/26/2015); Cardiac defibrillator placement (Left, 10/2020); Tonsillectomy; and pacemaker placement. SocHx:  reports that she has never smoked. She has never used smokeless tobacco. She reports current alcohol use. She reports that she does not use drugs. FamHx: No family history of premature coronary artery disease, sudden death, or aneurysm  Allergies: Lisinopril     MEDICATIONS      Prior to Admission medications    Medication Sig Start Date End Date Taking? Authorizing Provider   fluconazole (DIFLUCAN) 150 MG tablet Take 1 tablet by mouth once for 1 dose 12/5/22 12/5/22 Yes Mateo Myers MD   doxycycline hyclate (VIBRAMYCIN) 100 MG capsule TAKE ONE CAPSULE BY MOUTH DAILY FOR 10 DAYS THEN TAKE 1 CAPSULE(S) EVERY MONDAY, WEDNESDAY, AND FRIDAY AFTER THAT. 10/18/22  Yes Wendy Lugo MD   nystatin (MYCOSTATIN) 899905 UNIT/GM powder Apply topically as needed Apply topically 4 times daily. Yes Historical Provider, MD   nystatin (MYCOSTATIN) 193323 UNIT/GM ointment Apply topically as needed Apply topically 2 times daily.    Yes Historical Provider, MD   sacubitril-valsartan (ENTRESTO) 49-51 MG per tablet Take 1.5 tablets by mouth 2 times daily 10/13/22  Yes Mateo Myers MD   spironolactone (ALDACTONE) 25 MG tablet Take 1 tablet by mouth daily 9/15/22  Yes Mateo Myers MD   Multiple Vitamins-Minerals (ICAPS AREDS 2 PO) Take by mouth   Yes Historical Provider, MD   Fluticasone furoate-vilanterol (BREO ELLIPTA) 200-25 MCG/INH AEPB inhaler INHALE ONE DOSE BY MOUTH DAILY 12/17/21  Yes Wendy Lugo MD   SPIRIVA RESPIMAT 2.5 MCG/ACT AERS inhaler INHALE TWO PUFFS BY MOUTH DAILY 12/17/21  Yes Wendy Lugo MD   carvedilol (COREG) 12.5 MG tablet TAKE ONE TABLET BY MOUTH TWICE A DAY 12/15/21  Yes Nickie Lombard, APRN - CNS albuterol sulfate HFA (PROVENTIL HFA) 108 (90 Base) MCG/ACT inhaler Inhale 2 puffs into the lungs every 4 hours as needed for Wheezing or Shortness of Breath 8/26/20  Yes Nguyen Lorenz MD   Multiple Vitamins-Minerals (MULTIVITAMIN ADULT PO) Take by mouth   Yes Historical Provider, MD   fluticasone (FLONASE) 50 MCG/ACT nasal spray 2 sprays by Nasal route daily  Patient taking differently: 2 sprays by Nasal route daily as needed 7/14/16  Yes Sofi Alonzo MD   BiPAP Machine MISC by Does not apply route   Yes Historical Provider, MD   metFORMIN (GLUCOPHAGE) 500 MG tablet Take 500 mg by mouth 2 times daily (with meals)    Yes Historical Provider, MD   atorvastatin (LIPITOR) 20 MG tablet Take 20 mg by mouth daily. Yes Historical Provider, MD   aspirin EC 81 MG EC tablet Take 1 tablet by mouth daily. 7/15/13  Yes Asher Rodriguez MD   acetaminophen (TYLENOL) 325 MG tablet Take 650 mg by mouth every 6 hours as needed. Yes Historical Provider, MD   furosemide (LASIX) 40 MG tablet ON HOLD FOR NOW  Patient not taking: Reported on 12/5/2022 10/13/22   Jyotsna Garzon MD       PHYSICAL EXAM        Vitals:    10/05/22 1315   BP: 84/62 (right arm) 115/70 (right leg)   Pulse:    SpO2:       Weight: 224 lb 6.4 oz (101.8 kg)     Gen Alert, cooperative, no distress Heart  Regular rate and rhythm, III/VI medium pitched NGOC loudest at LUSB. Diastole is quiet.    Head Normocephalic, atraumatic, no abnormalities Abd  Soft, NT, +BS, no mass, no OM   Eyes PERRLA, conj/corn clear Ext  Ext nl, AT, no C/C, no edema   Nose Nares normal, no drain age, Non-tender Pulse 2+ and symmetric   Throat Lips, mucosa, tongue normal Skin Color/text/turg nl, no rash/lesions   Neck S/S, TM, NT, no bruit Psych Nl mood and affect   Lung CTA-B, unlabored, no DTP     Ch wall NT, no deform, bilateral BTT shunt scars       LABS and Imaging     Relevant and available CV data reviewed    EKG personally interpreted: AV paced    8/17/22 Lipid Panel   HDL 48 LDL 64  3/6/21 Lipid Panel   HDL 44 LDL 67    10/5/22 Pro       Echo 7/18/22  Summary  Very poor image quality likely due to scar tissue from prior congenital heart surgery. Overall left ventricular systolic function appears globally depressed. Normal left ventricular size. Unable to estimate LVEF due to inadequate endocardial border definition. RV not well visualized but appears moderately dilated with at least mildly depressed function. Indeterminate diastolic function. Mild TR with normal RVSP. No other obvious valve abnormalities, but Doppler exam is very limited. Recommend repeat limited echo with Definity contrast to evaluate LVEF versus other imaging modality. The mitral valve appears structurally normal. Mild mitral regurgitation. Dilated left atrium with increased left atrial volume. Aortic valve appears sclerotic but opens adequately. Mild aortic regurgitation. The right ventricle is moderately enlarged. Right ventricular systolic function is moderately reduced . TAPSE= 1.98 RV S'= 8.84  Pacer / ICD wire is visualized in the right ventricle. Mild tricuspid regurgitation. Systolic pulmonary artery pressure (SPAP) estimated at 33 mmHg (RA pressure 3 mmHg). The right atrial size is normal.  Tetraology of Fallot Repair noted     [facility]  1. Tetralogy of Fallot       - s/p repair with non trans-annular patch infundibular resection (5/12/1963, [place]). 2. Limited echocardiographic windows. Unable to accurately quantify ventricular function. Recommend cross-sectional imaging. 3. Paradoxical interventricular septum motion. 4. Mild tricuspid valve regurgitation. 5. Dilated right atrium. 6. Right ventricle is borderline dilated and the systolic function is moderately diminished.     7. Left ventricle is mildly dilated and the systolic function is qualitatively low normal.    8. Echogenic density present in the innominate vein with turbulent flow which may be associated with lead. 9. There is no significant pericardial effusion. 10. Compared to the previous echocardiogram of 8/8/2019, there is no significant change. GXT Stress:[facility] 4/4/16  An exercise test was performed using treadmill exercise with a Ramp protocol (modified) and Oximetry. The maximal oxygen consumption was less than   expected . The max VO2 was 13 ml/kg/min. The heart rhythm was AV sequentially paced with frequent predominantly uniform premature ventricular   contractions before, during, and after exercise and a multiform ventricular couplet during exercise. The atrial mechanism during exercise  is unclear. There is complete ventricular capture and sensing when appropriate. There were no pathologic ST segment or T wave changes . Pulse oximetry was   abnormal with 96% before, 92% during and immediately post, and 96% one minute, three minutes, and five minutes post exercise. No symptoms were   reported . Cath: [facility]  1. Left main coronary artery was free of any angiographically   significant disease. It gave of the left anterior descending   artery and the left circumflex artery. 2. The left anterior descending had a normal anatomic course. Large caliber vessel. No diagonals were noted but there was no   suggestion of occlusion or stenosis of the diagonals     3. The left circumflex artery gives off obtuse marginals and it   was free of any angiographically significant disease. 4. The right coronary artery had a slightly higher anterior take   off,  gave rise to posterior descending artery and posterolateral   branches distally. It was free of any angiographically   significant disease. LV gram appears to show an LVEF of 40%.  A small apical   diverticulum was appreciated   There was small leak across the Ventricular patch     RV gram showed mild tricuspid regurgitation with RV function   moderately reduced around 30%     Aneursymal proximal pulmonary artery. HEMODYNAMICS:   LVEDP: 15   LVEF:40%   RVEF30%   No gradient across the aortic valve     Gradient across the pulmonic valve :   - peak gradient was 8 mm Hg   - mean gradient was 6 mm Hg     FINAL DIAGNOSIS(ES): or CONCLUSION:   Mildly elevated filling pressures without any significant LV to   RV interaction   Biventricular failure   Mild Transventicular patch leak, shunt was calculated to be not   significant at 0.94   Moderate transpulmonic valve gradient. Narrowing of the pulmonic annulus, with no signficant stenosis of   the pulmonic valve appreciated     Dr. Alan Larios and David Brooks  were present during all critical and   key portions of the procedure and was available to furnish   services during the entire procedure. Moderate Risk  Moderate Complexity/Medical Decision Making    Outside/Care everywhere records Reviewed  Labs Reviewed  Prior Imaging, ekg, cath, echo reviewed when available  Medications reviewed  Old Notes reviewed  ASSESSMENT AND PLAN     Tetralogy of Fallot s/p repair, murmur consistent with PS versus sub PS  Murmur sounds consistent with a peak gradient of at least 30-40, higher than what was reported on echo  I doubt that her echo windows are adequate to evaluate this accurately. She doesn't have significant pulmonary regurgitation. Ideally we would get an MRI but that would be difficult to do given her biV ICD. For now will check labs, titrate meds, and plan to repeat auscultation with a low threshold to repeat echo in the near future. Plan:   Repeat echo at next visit  BPs only in legs, not arms    2. Chronic Systolic Heart Failure   6/33/47 Pro   On  BB, Lasix, Entresto, Jardiance, spironolactone   tolerating medication well. She had bilateral BTT shunts so arm blood pressures on both sides are inaccurate and she needs to have leg pressures.     Will make lasix daily instead of BID in order to get more room for Entresto  Increase Entresto by 50% to ~75/75 BID  Continue other meds  New labs today BNP and BMP  Plan: Stopped Jardiance due to yeast infections x3.       3. Yeast infection  Stop jardiance  Ordered Diflucan today. 4.ICD   Due to heart failure and wide paced QRS on 8/26/2015 she had Biv upgrade of her device. Plan:Follows Dr Asmita Carpenter     5. SINTIA treated with Bipap   Patient counseled on lifestyle modification, diet, and exercise. 6. Type 2 DM   8/17/22 A1c 7.1  Metformin, per PCP    Follow Up: 6 months with echo     Rivera Johnson MD  Cardiologist Talha Smallwoode's Attestation: This note was scribed in the presence of Dr. Edmund Cortes MD by Kenrick Mccloud RN. 12/05/22     Physician Attestation  The scribe wrote this note in the presence of nathaly Johnson MD). The scribe may have prepopulated components of this note with my historical  intellectual property under my direct supervision. Any additions to this intellectual property were performed in my presence and at my direction. Furthermore, the content and accuracy of this note have been reviewed by me with edits by me as needed.   Rivera Johnson MD 12/5/2022 4:03 PM

## 2022-11-30 ENCOUNTER — HOSPITAL ENCOUNTER (OUTPATIENT)
Dept: CARDIAC REHAB | Age: 67
Setting detail: THERAPIES SERIES
Discharge: HOME | End: 2022-11-30
Payer: COMMERCIAL

## 2022-11-30 PROCEDURE — 93798 PHYS/QHP OP CAR RHAB W/ECG: CPT

## 2022-12-02 ENCOUNTER — HOSPITAL ENCOUNTER (OUTPATIENT)
Dept: CARDIAC REHAB | Age: 67
Setting detail: THERAPIES SERIES
Discharge: HOME | End: 2022-12-02
Payer: COMMERCIAL

## 2022-12-02 PROCEDURE — 93798 PHYS/QHP OP CAR RHAB W/ECG: CPT

## 2022-12-05 ENCOUNTER — HOSPITAL ENCOUNTER (OUTPATIENT)
Dept: CARDIAC REHAB | Age: 67
Setting detail: THERAPIES SERIES
Discharge: HOME | End: 2022-12-05
Payer: COMMERCIAL

## 2022-12-05 ENCOUNTER — OFFICE VISIT (OUTPATIENT)
Dept: CARDIOLOGY CLINIC | Age: 67
End: 2022-12-05
Payer: COMMERCIAL

## 2022-12-05 VITALS
BODY MASS INDEX: 36.07 KG/M2 | HEART RATE: 64 BPM | SYSTOLIC BLOOD PRESSURE: 120 MMHG | HEIGHT: 66 IN | WEIGHT: 224.4 LBS | DIASTOLIC BLOOD PRESSURE: 70 MMHG | OXYGEN SATURATION: 94 %

## 2022-12-05 DIAGNOSIS — Z87.74 TETRALOGY OF FALLOT S/P REPAIR: ICD-10-CM

## 2022-12-05 DIAGNOSIS — E11.9 TYPE 2 DIABETES MELLITUS WITHOUT COMPLICATION, WITHOUT LONG-TERM CURRENT USE OF INSULIN (HCC): ICD-10-CM

## 2022-12-05 DIAGNOSIS — I50.22 CHRONIC SYSTOLIC HEART FAILURE (HCC): Primary | ICD-10-CM

## 2022-12-05 DIAGNOSIS — G47.33 OSA TREATED WITH BIPAP: ICD-10-CM

## 2022-12-05 DIAGNOSIS — Z45.02 ICD (IMPLANTABLE CARDIOVERTER-DEFIBRILLATOR) BATTERY DEPLETION: ICD-10-CM

## 2022-12-05 PROCEDURE — 99214 OFFICE O/P EST MOD 30 MIN: CPT | Performed by: INTERNAL MEDICINE

## 2022-12-05 PROCEDURE — 1123F ACP DISCUSS/DSCN MKR DOCD: CPT | Performed by: INTERNAL MEDICINE

## 2022-12-05 PROCEDURE — 93798 PHYS/QHP OP CAR RHAB W/ECG: CPT

## 2022-12-05 RX ORDER — FLUCONAZOLE 150 MG/1
150 TABLET ORAL ONCE
Qty: 1 TABLET | Refills: 0 | Status: SHIPPED | OUTPATIENT
Start: 2022-12-05 | End: 2022-12-05

## 2022-12-05 NOTE — PATIENT INSTRUCTIONS
Follow up with Dr Lauren Rees in 6 months with Echo   Labs day of next visit   Stop Galvin Mortimer for yeast infection   Call for any questions or concerns.

## 2022-12-07 ENCOUNTER — HOSPITAL ENCOUNTER (OUTPATIENT)
Dept: CARDIAC REHAB | Age: 67
Setting detail: THERAPIES SERIES
Discharge: HOME | End: 2022-12-07
Payer: COMMERCIAL

## 2022-12-07 PROCEDURE — 93798 PHYS/QHP OP CAR RHAB W/ECG: CPT

## 2022-12-09 ENCOUNTER — HOSPITAL ENCOUNTER (OUTPATIENT)
Dept: CARDIAC REHAB | Age: 67
Setting detail: THERAPIES SERIES
Discharge: HOME | End: 2022-12-09
Payer: COMMERCIAL

## 2022-12-09 PROCEDURE — 93798 PHYS/QHP OP CAR RHAB W/ECG: CPT

## 2022-12-12 ENCOUNTER — HOSPITAL ENCOUNTER (OUTPATIENT)
Dept: CARDIAC REHAB | Age: 67
Setting detail: THERAPIES SERIES
Discharge: HOME | End: 2022-12-12
Payer: COMMERCIAL

## 2022-12-12 PROCEDURE — 93798 PHYS/QHP OP CAR RHAB W/ECG: CPT

## 2022-12-14 ENCOUNTER — HOSPITAL ENCOUNTER (OUTPATIENT)
Dept: CARDIAC REHAB | Age: 67
Setting detail: THERAPIES SERIES
Discharge: HOME | End: 2022-12-14
Payer: COMMERCIAL

## 2022-12-14 PROCEDURE — 93798 PHYS/QHP OP CAR RHAB W/ECG: CPT

## 2022-12-16 ENCOUNTER — HOSPITAL ENCOUNTER (OUTPATIENT)
Dept: CARDIAC REHAB | Age: 67
Setting detail: THERAPIES SERIES
Discharge: HOME | End: 2022-12-16
Payer: COMMERCIAL

## 2022-12-16 PROCEDURE — 93798 PHYS/QHP OP CAR RHAB W/ECG: CPT

## 2022-12-19 ENCOUNTER — HOSPITAL ENCOUNTER (OUTPATIENT)
Dept: CARDIAC REHAB | Age: 67
Setting detail: THERAPIES SERIES
Discharge: HOME | End: 2022-12-19
Payer: COMMERCIAL

## 2022-12-19 PROCEDURE — 93798 PHYS/QHP OP CAR RHAB W/ECG: CPT

## 2022-12-21 ENCOUNTER — HOSPITAL ENCOUNTER (OUTPATIENT)
Dept: CARDIAC REHAB | Age: 67
Setting detail: THERAPIES SERIES
End: 2022-12-21
Payer: COMMERCIAL

## 2022-12-23 ENCOUNTER — APPOINTMENT (OUTPATIENT)
Dept: CARDIAC REHAB | Age: 67
End: 2022-12-23
Payer: COMMERCIAL

## 2022-12-28 ENCOUNTER — APPOINTMENT (OUTPATIENT)
Dept: CARDIAC REHAB | Age: 67
End: 2022-12-28
Payer: COMMERCIAL

## 2022-12-30 ENCOUNTER — APPOINTMENT (OUTPATIENT)
Dept: CARDIAC REHAB | Age: 67
End: 2022-12-30
Payer: COMMERCIAL

## 2023-01-10 NOTE — TELEPHONE ENCOUNTER
Pt called back stating her question was not addressed. Pt states entresto was increased to 1.5 tab twice daily. She is having lightheadedness and asking if it would be ok to take 1 tab 3 times a day. Also asking if she should be taking furosemide at all. Please call back and leave detailed message  on [employer] and she will call back  if necessary. ambulatory

## 2023-01-11 NOTE — TELEPHONE ENCOUNTER
Last person to refill: 10.12.23 WAK  Last office visit: 12.05.22 [facility]   Last Labs: 10.05.22  Next office visit: 06.05.23 [facility]

## 2023-01-11 NOTE — TELEPHONE ENCOUNTER
Medication Refill    Medication needing refilled:  sacubitril-valsartan (ENTRESTO) 49-51 MG per tablet     Dosage of the medication:    How are you taking this medication (QD, BID, TID, QID, PRN): 1.5 tabs 2 x a day    30 or 90 day supply called in:90    When will you run out of your medication:    Which Pharmacy are we sending the medication to?:    [facility] [address] [place] [address]# [phone]

## 2023-01-11 NOTE — TELEPHONE ENCOUNTER
Pt was ordered meds on 10/12/22 with 5 refills. Please call pt to verify that she is taking as prescribed. She should have enough medication through mid March. Also call pharmacy to verify that they have the order as well. Thank you.

## 2023-01-12 NOTE — TELEPHONE ENCOUNTER
The pt states the entresto was increased and she needs new Rx. PT takes 1 and half table daily,so she needs 90 for the month and [facility] [place].

## 2023-01-12 NOTE — TELEPHONE ENCOUNTER
Please call the pharmacy or the patient and have her call the pharmacy and request the script be transferred to the pharmacy she would like. In the future we can send to that pharmacy. Thank you.

## 2023-01-20 NOTE — TELEPHONE ENCOUNTER
Spoke to pt. Pt stated she is not taking any entresto samples from us. she gets Entresto from pharmacy. She wants 90 tabs for a month because she takes 1.5 tab qd. Pt stated she prefer [facility] pharmacy at [place] [id number]. Pt is out of med, from last 2 weeks.

## 2023-02-02 NOTE — TELEPHONE ENCOUNTER
Last OV: 1/20/23  Last labs: 10/5/22  Appt scheduled :  6/5/23        Patient Comment: the company will only let you use the discount for 60 pills /month  The pharmacist suggested that perhaps the dosage needs increase so that it is only 60 instead of the 90 requested    Any suggestions  I am presently taking 2/day to make them last for the month

## 2023-02-17 ENCOUNTER — TELEPHONE (OUTPATIENT)
Dept: CARDIOLOGY CLINIC | Age: 68
End: 2023-02-17

## 2023-02-17 NOTE — TELEPHONE ENCOUNTER
Called pt to check in on how she is doing on the [facility]. NA, No HIPAA on file. Left brief message to call us back.

## 2023-02-17 NOTE — TELEPHONE ENCOUNTER
Pt returned call, she said she is doing fine on the Entresto 49-51mg but is only taking 1 tab twice a day due to insurance would only cover 60 pills per month. Wanted to know if we could increase the dosage so she could take them that way? Please advise.

## 2023-03-08 ENCOUNTER — NURSE ONLY (OUTPATIENT)
Dept: CARDIOLOGY CLINIC | Age: 68
End: 2023-03-08
Payer: COMMERCIAL

## 2023-03-08 DIAGNOSIS — I42.9 PRIMARY CARDIOMYOPATHY (HCC): Primary | Chronic | ICD-10-CM

## 2023-03-08 DIAGNOSIS — I50.22 CHRONIC SYSTOLIC HF (HEART FAILURE) (HCC): ICD-10-CM

## 2023-03-08 DIAGNOSIS — Z95.810 IMPLANTABLE CARDIOVERTER-DEFIBRILLATOR (ICD) IN SITU: Chronic | ICD-10-CM

## 2023-03-08 PROCEDURE — 93296 REM INTERROG EVL PM/IDS: CPT | Performed by: INTERNAL MEDICINE

## 2023-03-08 PROCEDURE — 93295 DEV INTERROG REMOTE 1/2/MLT: CPT | Performed by: INTERNAL MEDICINE

## 2023-03-08 NOTE — PROGRESS NOTES
Remote transmission received from patient's CRT-D monitor at home. Transmission shows normal sensing and pacing function. Known hx elevated LV threshold w Adaptive output at max 3.0V/1.0ms. No new arrhythmias/events. Ap 79.6%  BiVp 99.4%, Effective 79.5% (known and suspect d/t possible intermittent loss of LV capture w output at threshold)  VSRp <0.1%  PVCs 19.7 p/hr    Optivol is WNL. TriageHF Heart Failure Risk Status on 07-Mar-2023 is Medium*    EP/ NP to review. See interrogation under cardiology tab in the [address] [place] [address] [address] field for more details. Will continue to monitor remotely. (End of 91-day monitoring period 3/8/23).

## 2023-03-30 RX ORDER — DOXYCYCLINE HYCLATE 100 MG/1
CAPSULE ORAL
Qty: 12 CAPSULE | Refills: 5 | Status: SHIPPED | OUTPATIENT
Start: 2023-03-30

## 2023-04-25 ENCOUNTER — NURSE ONLY (OUTPATIENT)
Dept: CARDIOLOGY CLINIC | Age: 68
End: 2023-04-25
Payer: COMMERCIAL

## 2023-04-25 DIAGNOSIS — Z95.810 IMPLANTABLE CARDIOVERTER-DEFIBRILLATOR (ICD) IN SITU: Chronic | ICD-10-CM

## 2023-04-25 DIAGNOSIS — I42.8 NON-ISCHEMIC CARDIOMYOPATHY (HCC): Primary | ICD-10-CM

## 2023-04-25 DIAGNOSIS — I50.22 CHRONIC SYSTOLIC HF (HEART FAILURE) (HCC): ICD-10-CM

## 2023-04-25 PROCEDURE — G2066 INTER DEVC REMOTE 30D: HCPCS | Performed by: NURSE PRACTITIONER

## 2023-04-25 PROCEDURE — 93297 REM INTERROG DEV EVAL ICPMS: CPT | Performed by: NURSE PRACTITIONER

## 2023-04-25 NOTE — PROGRESS NOTES
Remote transmission received from patient's CRT-D monitor at home. Transmission shows normal sensing and pacing function. Known hx elevated LV threshold w Adaptive output at max 3.0V/1.0ms. No new arrhythmias/events. Ap 83.3%  BiVp 99.4%, Effective 80.8% (known and suspect d/t possible intermittent loss of LV capture w output at threshold)  VSRp <0.1%  PVCs 16.9 p/hr    Possible Optivol fluid accumulation 4/16/23 --- ongoing. Currently elevated around 100, slightly above threshold, with correlating TI trend below reference line. TriageHF Heart Failure Risk Status on 25-Apr-2023 is Medium*    NP to review. See interrogation under cardiology tab in the [facility] [address] field for more details. Will continue to monitor remotely. (End of 31-day monitoring period 4/25/23).

## 2023-04-26 ENCOUNTER — TELEPHONE (OUTPATIENT)
Dept: CARDIOLOGY CLINIC | Age: 68
End: 2023-04-26

## 2023-04-26 NOTE — TELEPHONE ENCOUNTER
Pt called stating she is returning Miri's call about her recent transmission. 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 Please advise pt.

## 2023-04-26 NOTE — TELEPHONE ENCOUNTER
Spoke to patient a bit sob, current weight 220, weight gain of 4 pds but has lost 2 pds of it, swelling in feet, was on jardiance but had yeast infections and stopped, not currently taking furosemide, should she take the furosemide?   Please  advise

## 2023-04-26 NOTE — TELEPHONE ENCOUNTER
Remote transmission received from patient's CRT-D monitor at home. Transmission shows normal sensing and pacing function. Known hx elevated LV threshold w Adaptive output at max 3.0V/1.0ms. No new arrhythmias/events. Ap 83.3%  BiVp 99.4%, Effective 80.8% (known and suspect d/t possible intermittent loss of LV capture w output at threshold)  VSRp <0.1%  PVCs 16.9 p/hr    Possible Optivol fluid accumulation 4/16/23 --- ongoing. Currently elevated around 100, slightly above threshold, with correlating TI trend below reference line. TriageHF Heart Failure Risk Status on 25-Apr-2023 is Medium*    NP to review. See interrogation under cardiology tab in the [facility] [address] field for more details. Will continue to monitor remotely. (End of 31-day monitoring period 4/25/23).

## 2023-04-26 NOTE — TELEPHONE ENCOUNTER
----- Message from JEYSON Gamboa - CNP sent at 4/25/2023  2:28 PM EDT -----  Reviewed, optivol trending up, please call pt to ask about CHF sx.  Dionne Loza

## 2023-05-16 ENCOUNTER — OFFICE VISIT (OUTPATIENT)
Dept: PULMONOLOGY | Age: 68
End: 2023-05-16
Payer: COMMERCIAL

## 2023-05-16 VITALS — HEART RATE: 84 BPM | OXYGEN SATURATION: 95 %

## 2023-05-16 DIAGNOSIS — G47.33 OSA TREATED WITH BIPAP: Chronic | ICD-10-CM

## 2023-05-16 DIAGNOSIS — J98.4 RESTRICTIVE LUNG DISEASE: ICD-10-CM

## 2023-05-16 DIAGNOSIS — I50.22 CHRONIC SYSTOLIC HEART FAILURE (HCC): ICD-10-CM

## 2023-05-16 DIAGNOSIS — J44.9 COPD, SEVERE (HCC): Primary | Chronic | ICD-10-CM

## 2023-05-16 DIAGNOSIS — J98.6 ELEVATED DIAPHRAGM: ICD-10-CM

## 2023-05-16 PROCEDURE — 1123F ACP DISCUSS/DSCN MKR DOCD: CPT | Performed by: INTERNAL MEDICINE

## 2023-05-16 PROCEDURE — 99214 OFFICE O/P EST MOD 30 MIN: CPT | Performed by: INTERNAL MEDICINE

## 2023-05-16 NOTE — PROGRESS NOTES
topically 4 times daily. Historical Provider, MD   nystatin (MYCOSTATIN) 558825 UNIT/GM ointment Apply topically as needed Apply topically 2 times daily. Historical Provider, MD   furosemide (LASIX) 40 MG tablet ON HOLD FOR NOW  Patient not taking: Reported on 12/5/2022  Madison Villela MD   spironolactone (ALDACTONE) 25 MG tablet Take 1 tablet by mouth daily  Madison Villela MD   Multiple Vitamins-Minerals (ICAPS AREDS 2 PO) Take by mouth  Historical Provider, MD   Fluticasone furoate-vilanterol (BREO ELLIPTA) 200-25 MCG/INH AEPB inhaler INHALE ONE DOSE BY MOUTH DAILY  Nelson Ruby MD   SPIRIVA RESPIMAT 2.5 MCG/ACT AERS inhaler INHALE TWO PUFFS BY MOUTH DAILY  Nelson Ruby MD   carvedilol (COREG) 12.5 MG tablet TAKE ONE TABLET BY MOUTH TWICE A DAY  Alissa Gracia, APRN - CNS   albuterol sulfate HFA (PROVENTIL HFA) 108 (90 Base) MCG/ACT inhaler Inhale 2 puffs into the lungs every 4 hours as needed for Wheezing or Shortness of Breath  Nelson Ruby MD   Multiple Vitamins-Minerals (MULTIVITAMIN ADULT PO) Take by mouth  Historical Provider, MD   fluticasone (FLONASE) 50 MCG/ACT nasal spray 2 sprays by Nasal route daily  Patient taking differently: 2 sprays by Nasal route daily as needed  Leah Phillips MD   BiPAP Machine MISC by Does not apply route  Historical Provider, MD   metFORMIN (GLUCOPHAGE) 500 MG tablet Take 500 mg by mouth 2 times daily (with meals)   Historical Provider, MD   atorvastatin (LIPITOR) 20 MG tablet Take 20 mg by mouth daily. Historical Provider, MD   aspirin EC 81 MG EC tablet Take 1 tablet by mouth daily. Marielos Urena MD   acetaminophen (TYLENOL) 325 MG tablet Take 650 mg by mouth every 6 hours as needed. Historical Provider, MD       Vitals:    05/16/23 1601   Pulse: 84   SpO2: 95%       There is no height or weight on file to calculate BMI.      Wt Readings from Last 3 Encounters:   12/05/22 224 lb 6.4 oz (101.8 kg)   10/13/22 224 lb 8 oz (101.8 kg)

## 2023-05-30 ENCOUNTER — NURSE ONLY (OUTPATIENT)
Dept: CARDIOLOGY CLINIC | Age: 68
End: 2023-05-30
Payer: COMMERCIAL

## 2023-05-30 DIAGNOSIS — I42.8 NON-ISCHEMIC CARDIOMYOPATHY (HCC): ICD-10-CM

## 2023-05-30 DIAGNOSIS — I50.22 CHRONIC SYSTOLIC HEART FAILURE (HCC): Primary | ICD-10-CM

## 2023-05-30 DIAGNOSIS — Z95.810 ICD (IMPLANTABLE CARDIOVERTER-DEFIBRILLATOR) IN PLACE: ICD-10-CM

## 2023-05-30 PROCEDURE — 93296 REM INTERROG EVL PM/IDS: CPT | Performed by: INTERNAL MEDICINE

## 2023-05-30 PROCEDURE — 93295 DEV INTERROG REMOTE 1/2/MLT: CPT | Performed by: INTERNAL MEDICINE

## 2023-05-30 PROCEDURE — 93297 REM INTERROG DEV EVAL ICPMS: CPT | Performed by: CLINICAL NURSE SPECIALIST

## 2023-05-30 NOTE — PROGRESS NOTES
Remote transmission received from patient's CRT-D monitor at home. Transmission shows normal sensing and pacing function. Known hx elevated LV threshold w Adaptive output at max 3.0V/1.0ms. No new arrhythmias/events. Ap 76.4%  BiVp 99.4%, Effective 80.2% (known and suspect d/t possible intermittent loss of LV capture w output at threshold)  VSRp <0.1%  PVCs 11.7 p/hr    Optivol is WNL. TriageHF Heart Failure Risk Status on 30-May-2023 is Medium*    EP/ NP to review. See interrogation under cardiology tab in the [address] [place] [address] [address] field for more details. Will continue to monitor remotely. (End of 91-day monitoring period 5/30/23).

## 2023-06-05 ENCOUNTER — HOSPITAL ENCOUNTER (OUTPATIENT)
Dept: NON INVASIVE DIAGNOSTICS | Age: 68
Discharge: HOME | End: 2023-06-05
Payer: COMMERCIAL

## 2023-06-05 ENCOUNTER — OFFICE VISIT (OUTPATIENT)
Dept: CARDIOLOGY CLINIC | Age: 68
End: 2023-06-05
Payer: COMMERCIAL

## 2023-06-05 VITALS
WEIGHT: 223 LBS | HEIGHT: 66 IN | HEART RATE: 63 BPM | DIASTOLIC BLOOD PRESSURE: 70 MMHG | OXYGEN SATURATION: 98 % | SYSTOLIC BLOOD PRESSURE: 92 MMHG | BODY MASS INDEX: 35.84 KG/M2

## 2023-06-05 DIAGNOSIS — Z87.74 S/P TOF (TETRALOGY OF FALLOT) REPAIR: Primary | ICD-10-CM

## 2023-06-05 DIAGNOSIS — Z45.02 ICD (IMPLANTABLE CARDIOVERTER-DEFIBRILLATOR) BATTERY DEPLETION: ICD-10-CM

## 2023-06-05 DIAGNOSIS — I50.22 CHRONIC SYSTOLIC HF (HEART FAILURE) (HCC): ICD-10-CM

## 2023-06-05 DIAGNOSIS — G47.33 OSA TREATED WITH BIPAP: ICD-10-CM

## 2023-06-05 DIAGNOSIS — Z87.74 TETRALOGY OF FALLOT S/P REPAIR: ICD-10-CM

## 2023-06-05 DIAGNOSIS — E11.9 TYPE 2 DIABETES MELLITUS WITHOUT COMPLICATION, WITHOUT LONG-TERM CURRENT USE OF INSULIN (HCC): ICD-10-CM

## 2023-06-05 DIAGNOSIS — I50.22 CHRONIC SYSTOLIC HEART FAILURE (HCC): ICD-10-CM

## 2023-06-05 LAB
LV EF: 48 %
LVEF MODALITY: NORMAL

## 2023-06-05 PROCEDURE — 99214 OFFICE O/P EST MOD 30 MIN: CPT | Performed by: INTERNAL MEDICINE

## 2023-06-05 PROCEDURE — 6360000004 HC RX CONTRAST MEDICATION: Performed by: INTERNAL MEDICINE

## 2023-06-05 PROCEDURE — C8929 TTE W OR WO FOL WCON,DOPPLER: HCPCS

## 2023-06-05 PROCEDURE — 1123F ACP DISCUSS/DSCN MKR DOCD: CPT | Performed by: INTERNAL MEDICINE

## 2023-06-05 RX ADMIN — PERFLUTREN 1.5 ML: 6.52 INJECTION, SUSPENSION INTRAVENOUS at 13:25

## 2023-07-11 ENCOUNTER — NURSE ONLY (OUTPATIENT)
Dept: CARDIOLOGY CLINIC | Age: 68
End: 2023-07-11
Payer: COMMERCIAL

## 2023-07-11 DIAGNOSIS — Z95.810 IMPLANTABLE CARDIOVERTER-DEFIBRILLATOR (ICD) IN SITU: Chronic | ICD-10-CM

## 2023-07-11 DIAGNOSIS — I50.22 CHRONIC SYSTOLIC HEART FAILURE (HCC): ICD-10-CM

## 2023-07-11 DIAGNOSIS — I42.9 PRIMARY CARDIOMYOPATHY (HCC): Primary | Chronic | ICD-10-CM

## 2023-07-11 PROCEDURE — 93297 REM INTERROG DEV EVAL ICPMS: CPT | Performed by: CLINICAL NURSE SPECIALIST

## 2023-07-11 PROCEDURE — G2066 INTER DEVC REMOTE 30D: HCPCS | Performed by: CLINICAL NURSE SPECIALIST

## 2023-07-18 ENCOUNTER — TELEPHONE (OUTPATIENT)
Dept: CARDIOLOGY CLINIC | Age: 68
End: 2023-07-18

## 2023-07-18 DIAGNOSIS — I50.22 CHRONIC SYSTOLIC HEART FAILURE (HCC): Primary | ICD-10-CM

## 2023-07-18 NOTE — TELEPHONE ENCOUNTER
Pt returned call, discussed message per MORENITA. Pt will go tomorrow for labs. She has no further questions at this time.

## 2023-07-18 NOTE — TELEPHONE ENCOUNTER
Attempted to call patient per [facility] message. NA/LVM. BNP and BMP orders placed. Please advise patient if she returns call. Thank you!

## 2023-07-18 NOTE — TELEPHONE ENCOUNTER
----- Message from David Gaona MD sent at 7/18/2023  9:33 AM EDT -----  Let's get her a BMP and BNP and make sure her kidneys are ok    ----- Message -----  From: JEYSON Barber - CNS  Sent: 7/11/2023   8:38 AM EDT  To: David Gaona MD    Optival shows she is on the dry side since June 20 th  Will send to Dr Ricks Read as patient follows with him

## 2023-07-19 DIAGNOSIS — I50.22 CHRONIC SYSTOLIC HEART FAILURE (HCC): ICD-10-CM

## 2023-07-19 LAB
ANION GAP SERPL CALCULATED.3IONS-SCNC: 10 MMOL/L (ref 3–16)
BUN SERPL-MCNC: 17 MG/DL (ref 7–20)
CALCIUM SERPL-MCNC: 8.9 MG/DL (ref 8.3–10.6)
CHLORIDE SERPL-SCNC: 102 MMOL/L (ref 99–110)
CO2 SERPL-SCNC: 26 MMOL/L (ref 21–32)
CREAT SERPL-MCNC: 1.1 MG/DL (ref 0.6–1.2)
GFR SERPLBLD CREATININE-BSD FMLA CKD-EPI: 54 ML/MIN/{1.73_M2}
GLUCOSE SERPL-MCNC: 183 MG/DL (ref 70–99)
NT-PROBNP SERPL-MCNC: 495 PG/ML (ref 0–124)
POTASSIUM SERPL-SCNC: 4.6 MMOL/L (ref 3.5–5.1)
SODIUM SERPL-SCNC: 138 MMOL/L (ref 136–145)

## 2023-07-21 ENCOUNTER — TELEPHONE (OUTPATIENT)
Dept: CARDIOLOGY CLINIC | Age: 68
End: 2023-07-21

## 2023-07-21 NOTE — TELEPHONE ENCOUNTER
----- Message from Lea Coa MD sent at 7/21/2023 11:13 AM EDT -----  BNP creeping up, kidney numbers normal  Check and see if she is on her lasix  If she is, we'll just continue  If not, let's restart her lasix at 20 mg daily  Thanks

## 2023-07-21 NOTE — TELEPHONE ENCOUNTER
Called patient per [facility] message and discussed her BNP results. She was taking Lasix \"every once in a while\". Advised to take 20mg daily. Pt verbalized understanding and was agreeable to plan. No further questions at this time.

## 2023-08-02 ENCOUNTER — TELEPHONE (OUTPATIENT)
Dept: CARDIOLOGY CLINIC | Age: 68
End: 2023-08-02

## 2023-08-02 NOTE — TELEPHONE ENCOUNTER
Pt called stating that they received phone call from [facility] for follow up appt    Pt is wanting to since they follow Dr Lizzie Leone do they need to follow [facility] and [facility] ?     Pls advise thank you

## 2023-08-02 NOTE — TELEPHONE ENCOUNTER
CARDIAC CLEARANCE     What type of procedure are you having? Colonoscopy   Which physician is performing your procedure? Yanet Mcnally M.D  When is your procedure scheduled for? Pending Cardiologist Clearance   Where are you having this [facility]? [facility]   Are you taking Blood Thinners? If so what? Asprin   (Name/dose/frequesncy)  1 tablet daily    Does the surgeon want you to stop your blood thinner?   Have not mention stopping     Phone Number and Contact Name for Physicians office:  [phone]  Fax number to send information:  Pt did not have fax number

## 2023-08-07 NOTE — TELEPHONE ENCOUNTER
Called patient per [facility] message and discussed. Pt would prefer to only have to come to one appt so she will continue following with [facility]. Her letter for coloscopy was signed and approved today and pt was advised. Pt has no further questions at this time.

## 2023-08-14 NOTE — PROGRESS NOTES
[facility]   Electrophysiology Follow up   Date: 8/16/2023  I had the privilege of visiting Taty Perkins in the office. CC: MEJIA  HPI: Taty Perkins is a 77 y.o. female history of Tetrology of Fallot s/p repair in 1963 with a Jazmin-Taussig shunt and VSD howie patch. She had an AICD placed for cardiomyopathy with EF 25% in 2008. Device was dual chamber St. Jose Juan with Durata lead. She is pacemaker dependant and is being V paced more than 99%. She underwent LHC/RHC at [address] [place] [address] on July 9, 2015. Coronaries were normal, and EF reported 35%. Tetralogy of fallot repair 12/5/1963   Dr Pooja Belcher; Both Jazmin-Taussig shunts ligated. Extreme infundibular stenosis. Normal cors noted. Repeated aortic cross-clamps noted, none exceeding 12 mins. Transverse incision into RV. Infundibulum extensively resected from PA side as impossible to identify from below. Marked aortic dextroposition noted. Large VSD closed with howie patch. No TAP. Initially CHB. \"The following is copied from [facility], Dr. Tiffany Watkins: \"Myla had surgical repair of tetralogy of Fallot at the of 15 years by Dr. Pooja Belcher at [facility]. These were in the early days of myocardial protection as well as in the early days of cardiopulmonary bypass. The surgery was notable for the absence of a trans annular patch, and also for a transverse rather short RV incision. The infundibulum was very tightly (3mm) stenosed and repeated bouts of aortic cross clamp were required to deal with this and the VSD closure. This clearly placed her myocardium at risk of ischemic injury. In 2008 she presented with heart block and an ejection fraction off 25%, and was fitted with an endovascular cardio defibrillator with AV sequential pacing ability. \"     She follows Dr. Hernan Partida for SINTIA and also nocturnal hypoxemia. Due to heart failure and wide paced QRS on 8/26/2015 she had Biv upgrade of her device.

## 2023-08-16 ENCOUNTER — OFFICE VISIT (OUTPATIENT)
Dept: CARDIOLOGY CLINIC | Age: 68
End: 2023-08-16
Payer: COMMERCIAL

## 2023-08-16 ENCOUNTER — NURSE ONLY (OUTPATIENT)
Dept: CARDIOLOGY CLINIC | Age: 68
End: 2023-08-16

## 2023-08-16 VITALS
OXYGEN SATURATION: 94 % | DIASTOLIC BLOOD PRESSURE: 66 MMHG | HEIGHT: 66 IN | SYSTOLIC BLOOD PRESSURE: 90 MMHG | WEIGHT: 235 LBS | BODY MASS INDEX: 37.77 KG/M2 | HEART RATE: 63 BPM

## 2023-08-16 DIAGNOSIS — Z95.810 PRESENCE OF BIVENTRICULAR AICD: Primary | ICD-10-CM

## 2023-08-16 DIAGNOSIS — R00.1 BRADYCARDIA: ICD-10-CM

## 2023-08-16 DIAGNOSIS — I50.22 CHRONIC SYSTOLIC HEART FAILURE (HCC): ICD-10-CM

## 2023-08-16 DIAGNOSIS — I42.9 PRIMARY CARDIOMYOPATHY (HCC): Primary | Chronic | ICD-10-CM

## 2023-08-16 DIAGNOSIS — I42.8 NON-ISCHEMIC CARDIOMYOPATHY (HCC): ICD-10-CM

## 2023-08-16 DIAGNOSIS — I42.9 PRIMARY CARDIOMYOPATHY (HCC): Chronic | ICD-10-CM

## 2023-08-16 DIAGNOSIS — E78.2 MIXED HYPERLIPIDEMIA: ICD-10-CM

## 2023-08-16 DIAGNOSIS — G47.33 OSA TREATED WITH BIPAP: Chronic | ICD-10-CM

## 2023-08-16 DIAGNOSIS — Z95.810 PRESENCE OF BIVENTRICULAR AICD: ICD-10-CM

## 2023-08-16 PROCEDURE — 99214 OFFICE O/P EST MOD 30 MIN: CPT | Performed by: INTERNAL MEDICINE

## 2023-08-16 PROCEDURE — 93000 ELECTROCARDIOGRAM COMPLETE: CPT | Performed by: INTERNAL MEDICINE

## 2023-08-16 PROCEDURE — 1123F ACP DISCUSS/DSCN MKR DOCD: CPT | Performed by: INTERNAL MEDICINE

## 2023-08-16 NOTE — PROGRESS NOTES
[employer] FCA for Increased Potential for Reduced Energy or No Energy Delivered During High Voltage Therapy When Programmed AX > B    8/16/2023 Changed ALL pathways per [employer] FCA all pathways  B >AX

## 2023-08-31 ENCOUNTER — TELEPHONE (OUTPATIENT)
Dept: PULMONOLOGY | Age: 68
End: 2023-08-31

## 2023-08-31 NOTE — TELEPHONE ENCOUNTER
Patient called and needs a refill on medication.      albuterol sulfate HFA (PROVENTIL HFA) 108 (90 Base) MCG/ACT inhaler [[id number]]     SPIRIVA RESPIMAT 2.5 MCG/ACT AERS inhaler [[id number]]     Fluticasone furoate-vilanterol (BREO ELLIPTA) 200-25 MCG/INH AEPB inhaler [[id number]]       [facility]   [address], [place], [address]  [place] [address] [place]: [phone]

## 2023-09-01 RX ORDER — ALBUTEROL SULFATE 90 UG/1
2 AEROSOL, METERED RESPIRATORY (INHALATION) EVERY 4 HOURS PRN
Qty: 1 EACH | Refills: 5 | Status: SHIPPED | OUTPATIENT
Start: 2023-09-01

## 2023-09-01 RX ORDER — FLUTICASONE FUROATE AND VILANTEROL 200; 25 UG/1; UG/1
1 POWDER RESPIRATORY (INHALATION) DAILY
Qty: 60 EACH | Refills: 5 | Status: SHIPPED | OUTPATIENT
Start: 2023-09-01

## 2023-09-08 PROCEDURE — 93296 REM INTERROG EVL PM/IDS: CPT | Performed by: INTERNAL MEDICINE

## 2023-09-08 PROCEDURE — 93295 DEV INTERROG REMOTE 1/2/MLT: CPT | Performed by: INTERNAL MEDICINE

## 2023-09-25 ENCOUNTER — OFFICE VISIT (OUTPATIENT)
Dept: PULMONOLOGY | Age: 68
End: 2023-09-25
Payer: COMMERCIAL

## 2023-09-25 VITALS
HEIGHT: 66 IN | WEIGHT: 239 LBS | OXYGEN SATURATION: 94 % | BODY MASS INDEX: 38.41 KG/M2 | SYSTOLIC BLOOD PRESSURE: 122 MMHG | HEART RATE: 91 BPM | DIASTOLIC BLOOD PRESSURE: 86 MMHG

## 2023-09-25 DIAGNOSIS — J44.9 COPD, SEVERE (HCC): ICD-10-CM

## 2023-09-25 DIAGNOSIS — G47.33 OSA TREATED WITH BIPAP: Primary | ICD-10-CM

## 2023-09-25 DIAGNOSIS — I50.22 CHRONIC SYSTOLIC HEART FAILURE (HCC): ICD-10-CM

## 2023-09-25 DIAGNOSIS — I42.9 PRIMARY CARDIOMYOPATHY (HCC): ICD-10-CM

## 2023-09-25 PROCEDURE — 99214 OFFICE O/P EST MOD 30 MIN: CPT | Performed by: NURSE PRACTITIONER

## 2023-09-25 PROCEDURE — 1123F ACP DISCUSS/DSCN MKR DOCD: CPT | Performed by: NURSE PRACTITIONER

## 2023-09-25 ASSESSMENT — SLEEP AND FATIGUE QUESTIONNAIRES
HOW LIKELY ARE YOU TO NOD OFF OR FALL ASLEEP WHILE SITTING AND READING: 0
HOW LIKELY ARE YOU TO NOD OFF OR FALL ASLEEP WHILE LYING DOWN TO REST IN THE AFTERNOON WHEN CIRCUMSTANCES PERMIT: 1
HOW LIKELY ARE YOU TO NOD OFF OR FALL ASLEEP WHILE SITTING QUIETLY AFTER LUNCH WITHOUT ALCOHOL: 0
HOW LIKELY ARE YOU TO NOD OFF OR FALL ASLEEP WHILE WATCHING TV: 1
HOW LIKELY ARE YOU TO NOD OFF OR FALL ASLEEP WHILE SITTING INACTIVE IN A PUBLIC PLACE: 0
HOW LIKELY ARE YOU TO NOD OFF OR FALL ASLEEP WHILE SITTING AND TALKING TO SOMEONE: 0
HOW LIKELY ARE YOU TO NOD OFF OR FALL ASLEEP IN A CAR, WHILE STOPPED FOR A FEW MINUTES IN TRAFFIC: 0
ESS TOTAL SCORE: 3
HOW LIKELY ARE YOU TO NOD OFF OR FALL ASLEEP WHEN YOU ARE A PASSENGER IN A CAR FOR AN HOUR WITHOUT A BREAK: 1

## 2023-09-25 NOTE — PROGRESS NOTES
Gustavo Mcdowell CNP [facility] [address] [facility] [address], [address] [facility] [phone]   [address]  [address] [phone] [address] [address]  Dept: [phone]  Dept Fax: [phone]  Loc: [phone]      Assessment/Plan:      1. SINTIA treated with BiPAP  Assessment & Plan:  Chronic - Not sure if stable: Only minimum data available for analysis as she has not used the machine for a while. Supplies and parts as needed for the machine. These are medically necessary. Limit caffeine use after 3 PM. Based on the analyzed data, we will make the following change: EPAP min increased to 8. We will send an updated prescription for her machine settings to the manufacture so they can send her a replacement machine. Discussed issues with the manufacture recall and she was advised to continue to use the machine until she receives a replacement machine. At this time, she needs to start using her machine nightly so she can start treating her sleep apnea. She will return in 3 mo for follow up to check on progress and compliance. She will check with her insurance company to see which DME is covered under her insurance, and she will let us know so we can send a prescription for supplies. Encouraged her to start using her machine each night. Encouraged the patient to contact the office with any questions or concerns. Discussed the importance of consistence use of the machine and encouraged consistent use of the machine each night. Also discussed the importance of treating Obstructive Sleep Apnea from a physiological standpoint. Instructed not to drive unless had 4 hours of effective therapy for SINTIA the night before.  Did review the risks of under or untreated SINTIA including, but not limited to, higher risks of motor

## 2023-09-25 NOTE — ASSESSMENT & PLAN NOTE
Chronic - Not sure if stable: Only minimum data available for analysis as she has not used the machine for a while. Supplies and parts as needed for the machine. These are medically necessary. Limit caffeine use after 3 PM. Based on the analyzed data, we will make the following change: EPAP min increased to 8. We will send an updated prescription for her machine settings to the manufacture so they can send her a replacement machine. Discussed issues with the manufacture recall and she was advised to continue to use the machine until she receives a replacement machine. At this time, she needs to start using her machine nightly so she can start treating her sleep apnea. She will return in 3 mo for follow up to check on progress and compliance. She will check with her insurance company to see which DME is covered under her insurance, and she will let us know so we can send a prescription for supplies. Encouraged her to start using her machine each night. Encouraged the patient to contact the office with any questions or concerns. Discussed the importance of consistence use of the machine and encouraged consistent use of the machine each night. Also discussed the importance of treating Obstructive Sleep Apnea from a physiological standpoint. Instructed not to drive unless had 4 hours of effective therapy for SINTIA the night before. Did review the risks of under or untreated SINTIA including, but not limited to, higher risks of motor vehicle accidents, stroke, heart attacks, and death. Patients verbalized understanding and accepts all these risks. Discussed with patient today the recent recall issued by [employer] for their [employer] platform Pap machines. Reviewed that it affected a very small number of machines (0.03%) and seems to be exacerbated by using ozone disinfection or machine being exposed to a very high heat/humidity environment.   Recommended the patient immediately discontinue use of any

## 2023-10-11 RX ORDER — SPIRONOLACTONE 25 MG/1
25 TABLET ORAL DAILY
Qty: 90 TABLET | Refills: 3 | Status: SHIPPED | OUTPATIENT
Start: 2023-10-11

## 2023-10-11 NOTE — TELEPHONE ENCOUNTER
Medication Refill    Medication needing refilled:  spironolactone (ALDACTONE) 25 MG- PLEASE SEND TO NEW PHARMACY LISTED BELOW    Dosage of the medication:    How are you taking this medication (QD, BID, TID, QID, PRN): 1 tablet by mouth daily    30 or 90 day supply called in: 90 day  90 DAY SUPPLY    When will you run out of your medication:    Which Pharmacy are we sending the medication to?:[facility] [address] 1 - F [phone]

## 2023-10-11 NOTE — TELEPHONE ENCOUNTER
Last OV: 23 [facility]MM   Next OV: 23 [facility]  Last labs: BNP 23  Last EK23  Last filled:       Disp Refills Start End    spironolactone (ALDACTONE) 25 MG tablet 90 tablet 3 9/15/2022     Sig - Route:  Take 1 tablet by mouth daily - Oral    Sent to pharmacy as: Spironolactone 25 MG Oral Tablet (ALDACTONE)    Cosign for Ordering: Accepted by Lea Cao MD on 2022 11:34 AM    E-Prescribing Status: Receipt confirmed by pharmacy (9/15/2022  9:05 AM EDT)

## 2023-11-13 RX ORDER — DOXYCYCLINE HYCLATE 100 MG/1
CAPSULE ORAL
Qty: 12 CAPSULE | Refills: 5 | Status: SHIPPED | OUTPATIENT
Start: 2023-11-13

## 2023-11-27 ENCOUNTER — OFFICE VISIT (OUTPATIENT)
Dept: PULMONOLOGY | Age: 68
End: 2023-11-27
Payer: COMMERCIAL

## 2023-11-27 VITALS
OXYGEN SATURATION: 92 % | HEIGHT: 66 IN | WEIGHT: 236.8 LBS | BODY MASS INDEX: 38.06 KG/M2 | DIASTOLIC BLOOD PRESSURE: 66 MMHG | SYSTOLIC BLOOD PRESSURE: 126 MMHG | HEART RATE: 81 BPM

## 2023-11-27 DIAGNOSIS — J44.9 COPD, SEVERE (HCC): Primary | Chronic | ICD-10-CM

## 2023-11-27 DIAGNOSIS — I50.22 CHRONIC SYSTOLIC HEART FAILURE (HCC): ICD-10-CM

## 2023-11-27 DIAGNOSIS — J98.4 RESTRICTIVE LUNG DISEASE: ICD-10-CM

## 2023-11-27 DIAGNOSIS — G47.33 OSA TREATED WITH BIPAP: Chronic | ICD-10-CM

## 2023-11-27 DIAGNOSIS — J98.6 ELEVATED DIAPHRAGM: ICD-10-CM

## 2023-11-27 PROCEDURE — 99214 OFFICE O/P EST MOD 30 MIN: CPT | Performed by: INTERNAL MEDICINE

## 2023-11-27 PROCEDURE — 1123F ACP DISCUSS/DSCN MKR DOCD: CPT | Performed by: INTERNAL MEDICINE

## 2023-12-06 NOTE — PROGRESS NOTES
[facility]  12/11/23  Referring:    REASON FOR CONSULT/CHIEF COMPLAINT/HPI     Reason for visit/ Chief complaint   CHF/congenital heart disease   HPI Calin Pond is a 77 y.o. female here for follow up appointment. She has a history of Tetrology of Fallot s/p repair in 1963 with bilateral classic Jazmin-Taussig shunts and VSD howie patch. She had an AICD placed for cardiomyopathy with EF 25% in 2008. This was eventually upgraded to a biV ICD. LHC/RHC done at [facility] in 2015 showed normal coronaries and no evidence of constrction. LVEF 40% by LV gram at that time. She has severe COPD and restrictive lung disease, follows with Dr. Kathleen Magallanes for this. ARB changed to HUNDSHAGEN and added Jardiance. She tolerated this well. She remains NYHA II. She previously reported that she can't tell a difference since we started her on the Jardiance, Entresto and Aldactone. Rinda Blizzard was stopped last visit due to yeast infection. She had an echo in July which shows moderately dilated RV. Bernabe Brought shows increased impedence in May and July but back to normal now. Today she denies CP, palpitations. Her SOB is unchanged. She has a little bit of lightheadedness. No other complaints at this time. Patient is adherent with medications and is tolerating them well without side effects     HISTORY/ALLERGIES/ROS     MedHx:  has a past medical history of Bradycardia, CHF (congestive heart failure) ([address]), COPD (chronic obstructive pulmonary disease) ([address]), Hyperlipidemia, Hypertension, SINTIA treated with BiPAP, Stroke, hemorrhagic ([address]), and Type II or unspecified type diabetes mellitus without mention of complication, not stated as uncontrolled. SurgHx:  has a past surgical history that includes Cardiac defibrillator placement; Ovary removal; Cardiac catheterization (05/2015); Cholecystectomy; Cardiac defibrillator placement (08/26/2015); Cardiac defibrillator placement (Left, 10/2020);  Tonsillectomy; and

## 2023-12-08 PROCEDURE — 93295 DEV INTERROG REMOTE 1/2/MLT: CPT | Performed by: INTERNAL MEDICINE

## 2023-12-08 PROCEDURE — 93296 REM INTERROG EVL PM/IDS: CPT | Performed by: INTERNAL MEDICINE

## 2023-12-11 ENCOUNTER — OFFICE VISIT (OUTPATIENT)
Dept: CARDIOLOGY CLINIC | Age: 68
End: 2023-12-11
Payer: COMMERCIAL

## 2023-12-11 VITALS
BODY MASS INDEX: 37.93 KG/M2 | WEIGHT: 236 LBS | SYSTOLIC BLOOD PRESSURE: 149 MMHG | HEIGHT: 66 IN | HEART RATE: 70 BPM | OXYGEN SATURATION: 92 % | DIASTOLIC BLOOD PRESSURE: 83 MMHG

## 2023-12-11 DIAGNOSIS — E11.9 TYPE 2 DIABETES MELLITUS WITHOUT COMPLICATION, WITHOUT LONG-TERM CURRENT USE OF INSULIN (HCC): ICD-10-CM

## 2023-12-11 DIAGNOSIS — Z45.02 ICD (IMPLANTABLE CARDIOVERTER-DEFIBRILLATOR) BATTERY DEPLETION: ICD-10-CM

## 2023-12-11 DIAGNOSIS — I50.22 CHRONIC SYSTOLIC HF (HEART FAILURE) (HCC): ICD-10-CM

## 2023-12-11 DIAGNOSIS — Z87.74 S/P TOF (TETRALOGY OF FALLOT) REPAIR: Primary | ICD-10-CM

## 2023-12-11 DIAGNOSIS — G47.33 OSA TREATED WITH BIPAP: ICD-10-CM

## 2023-12-11 PROCEDURE — 1123F ACP DISCUSS/DSCN MKR DOCD: CPT | Performed by: INTERNAL MEDICINE

## 2023-12-11 PROCEDURE — 99214 OFFICE O/P EST MOD 30 MIN: CPT | Performed by: INTERNAL MEDICINE

## 2023-12-15 DIAGNOSIS — I50.22 CHRONIC SYSTOLIC HF (HEART FAILURE) (HCC): ICD-10-CM

## 2023-12-15 LAB
ALBUMIN SERPL-MCNC: 3.8 G/DL (ref 3.4–5)
ALBUMIN/GLOB SERPL: 1.4 {RATIO} (ref 1.1–2.2)
ALP SERPL-CCNC: 102 U/L (ref 40–129)
ALT SERPL-CCNC: 10 U/L (ref 10–40)
ANION GAP SERPL CALCULATED.3IONS-SCNC: 10 MMOL/L (ref 3–16)
AST SERPL-CCNC: 12 U/L (ref 15–37)
BILIRUB SERPL-MCNC: 0.4 MG/DL (ref 0–1)
BUN SERPL-MCNC: 15 MG/DL (ref 7–20)
CALCIUM SERPL-MCNC: 8.8 MG/DL (ref 8.3–10.6)
CHLORIDE SERPL-SCNC: 102 MMOL/L (ref 99–110)
CO2 SERPL-SCNC: 25 MMOL/L (ref 21–32)
CREAT SERPL-MCNC: 0.9 MG/DL (ref 0.6–1.2)
GFR SERPLBLD CREATININE-BSD FMLA CKD-EPI: >60 ML/MIN/{1.73_M2}
GLUCOSE SERPL-MCNC: 92 MG/DL (ref 70–99)
NT-PROBNP SERPL-MCNC: 788 PG/ML (ref 0–124)
POTASSIUM SERPL-SCNC: 4.4 MMOL/L (ref 3.5–5.1)
PROT SERPL-MCNC: 6.6 G/DL (ref 6.4–8.2)
SODIUM SERPL-SCNC: 137 MMOL/L (ref 136–145)

## 2023-12-18 PROBLEM — E66.812 CLASS 2 SEVERE OBESITY DUE TO EXCESS CALORIES WITH SERIOUS COMORBIDITY AND BODY MASS INDEX (BMI) OF 39.0 TO 39.9 IN ADULT: Status: ACTIVE | Noted: 2019-01-14

## 2023-12-18 PROBLEM — I50.22 CHRONIC SYSTOLIC HEART FAILURE (HCC): Status: ACTIVE | Noted: 2023-12-18

## 2023-12-18 PROBLEM — E66.01 CLASS 2 SEVERE OBESITY DUE TO EXCESS CALORIES WITH SERIOUS COMORBIDITY AND BODY MASS INDEX (BMI) OF 39.0 TO 39.9 IN ADULT (HCC): Status: ACTIVE | Noted: 2019-01-14

## 2024-01-02 RX ORDER — FLUTICASONE FUROATE AND VILANTEROL 200; 25 UG/1; UG/1
POWDER RESPIRATORY (INHALATION)
Qty: 180 EACH | Refills: 3 | Status: SHIPPED | OUTPATIENT
Start: 2024-01-02

## 2024-02-26 PROCEDURE — 93297 REM INTERROG DEV EVAL ICPMS: CPT | Performed by: CLINICAL NURSE SPECIALIST

## 2024-03-08 PROCEDURE — 93295 DEV INTERROG REMOTE 1/2/MLT: CPT | Performed by: INTERNAL MEDICINE

## 2024-03-08 PROCEDURE — 93296 REM INTERROG EVL PM/IDS: CPT | Performed by: INTERNAL MEDICINE

## 2024-04-02 ENCOUNTER — TELEPHONE (OUTPATIENT)
Dept: CARDIOLOGY CLINIC | Age: 69
End: 2024-04-02

## 2024-04-02 NOTE — TELEPHONE ENCOUNTER
Pt called stating that she re: a letter stating that her battery is low, not able to send a transmission manually due to the battery being low.  Please advise.  Thank you    NOTE:  Please call after 3:30pm

## 2024-04-09 NOTE — TELEPHONE ENCOUNTER
LM for pt to return call. Battery life appears to be good, home monitor has been disconnected >20 days. Needs to reconnect home monitor to defibrillator.

## 2024-04-22 RX ORDER — FUROSEMIDE 40 MG/1
TABLET ORAL
Qty: 180 TABLET | Refills: 0 | Status: SHIPPED | OUTPATIENT
Start: 2024-04-22 | End: 2024-04-24 | Stop reason: SDUPTHER

## 2024-04-22 NOTE — TELEPHONE ENCOUNTER
Medication Refill    Medication needing refilled:  furosemide (LASIX) 40 MG - PLEASE SEND TO NEW [facility] PHARMACY LISTED BELOW.  Dosage of the medication:    How are you taking this medication (QD, BID, TID, QID, PRN): one daily    30 or 90 day supply called in: 90 day supply    When will you run out of your medication:    Which Pharmacy are we sending the medication to?:[id number] - [address] -  [phone] - [facility] [phone]

## 2024-04-22 NOTE — TELEPHONE ENCOUNTER
Received refill request for Lasix from [facility] pharmacy.    Last ov:12/11/2023 [facility]    Last labs:12/15/2023 CMP    Last Refill:10/13/2022    Next appointment:08/13/2024 [facility]

## 2024-04-24 RX ORDER — FUROSEMIDE 40 MG/1
40 TABLET ORAL DAILY
Qty: 90 TABLET | Refills: 3 | Status: SHIPPED | OUTPATIENT
Start: 2024-04-24

## 2024-05-06 RX ORDER — DOXYCYCLINE HYCLATE 100 MG/1
CAPSULE ORAL
Qty: 12 CAPSULE | Refills: 5 | Status: SHIPPED | OUTPATIENT
Start: 2024-05-06

## 2024-05-31 ENCOUNTER — OFFICE VISIT (OUTPATIENT)
Dept: PULMONOLOGY | Age: 69
End: 2024-05-31
Payer: COMMERCIAL

## 2024-05-31 VITALS
WEIGHT: 238 LBS | HEART RATE: 67 BPM | BODY MASS INDEX: 39.65 KG/M2 | OXYGEN SATURATION: 91 % | DIASTOLIC BLOOD PRESSURE: 64 MMHG | SYSTOLIC BLOOD PRESSURE: 112 MMHG | HEIGHT: 65 IN

## 2024-05-31 DIAGNOSIS — J98.4 RESTRICTIVE LUNG DISEASE: ICD-10-CM

## 2024-05-31 DIAGNOSIS — I50.22 CHRONIC SYSTOLIC HF (HEART FAILURE) (HCC): ICD-10-CM

## 2024-05-31 DIAGNOSIS — G47.33 OSA TREATED WITH BIPAP: Chronic | ICD-10-CM

## 2024-05-31 DIAGNOSIS — J44.9 COPD, SEVERE (HCC): Primary | Chronic | ICD-10-CM

## 2024-05-31 DIAGNOSIS — J98.6 ELEVATED DIAPHRAGM: ICD-10-CM

## 2024-05-31 PROCEDURE — 99214 OFFICE O/P EST MOD 30 MIN: CPT | Performed by: INTERNAL MEDICINE

## 2024-05-31 PROCEDURE — 1123F ACP DISCUSS/DSCN MKR DOCD: CPT | Performed by: INTERNAL MEDICINE

## 2024-05-31 NOTE — PROGRESS NOTES
[facility]  Progress Note  Chandana Meyer MD       Adrianne Gamez   YOB: 1955    Date of Visit:  5/31/2024    Assessment/Plan:  1. COPD, severe (HCC)/SOB  PFT 10/15:  Spirometry reveals decreased FVC at 1.95 L, which is 60% predicted. FEV1 isndecreased at 1.27 L, which is 49% predicted. FEV1/FVC ratio is decreased at 66%. There is no significant change after inhaled bronchodilators. Lung volumes reveal decreased total lung capacity at 71% predicted. Vital capacity is decreased at 60% predicted. Diffusion capacity is decreased at 53% predicted.     IMPRESSION: Combined severe obstructive and moderate restrictive lung  disease    Medication Management:  The patient benefits from the medical regimen and reports no complications.    Breo 200  Spiriva Respimat (Anoro made her hoarse)  Albuterol prn  Doxycycline MWF    2. Elevated diaphragm  Chronic  Stable    3. SINTIA treated with BiPAP  BiPAP  Stable    4. Cardiomyopathy (HCC)  EF 25%  Has had some med adjustment that has helped  Sees Dr Segovia  Needs her pacer replaced.    5. Restrictive lung disease  2/2 obesity    Flu shot given  Recommend RSV and COVID as well    FOLLOW UP: 6 months    HPI  The patient presents with a chief complaint of moderate shortness of breath related to severe COPD of many years duration. He has mild associated cough. Exertion is a modifying factor.  She also has HFrEF and sees Dr Segovia.   She has been stable since her last visit.    She is in a walking boot right now.  This happened after she fell over her mother who she was trying to help.  Fortunately, her mother was not injured.    She has not had any recent flareups.      Review of Systems  No Chest pain, Nausea or vomiting reported      Allergies   Allergen Reactions    Lisinopril      Cough     Prior to Visit Medications    Medication Sig Taking? Authorizing Provider   doxycycline hyclate (VIBRAMYCIN) 100 MG capsule TAKE 1

## 2024-06-16 ASSESSMENT — SLEEP AND FATIGUE QUESTIONNAIRES
HOW LIKELY ARE YOU TO NOD OFF OR FALL ASLEEP WHILE WATCHING TV: SLIGHT CHANCE OF DOZING
HOW LIKELY ARE YOU TO NOD OFF OR FALL ASLEEP WHILE SITTING INACTIVE IN A PUBLIC PLACE: WOULD NEVER DOZE
HOW LIKELY ARE YOU TO NOD OFF OR FALL ASLEEP IN A CAR, WHILE STOPPED FOR A FEW MINUTES IN TRAFFIC: WOULD NEVER DOZE
HOW LIKELY ARE YOU TO NOD OFF OR FALL ASLEEP WHEN YOU ARE A PASSENGER IN A CAR FOR AN HOUR WITHOUT A BREAK: WOULD NEVER DOZE
HOW LIKELY ARE YOU TO NOD OFF OR FALL ASLEEP WHILE SITTING QUIETLY AFTER LUNCH WITHOUT ALCOHOL: WOULD NEVER DOZE
HOW LIKELY ARE YOU TO NOD OFF OR FALL ASLEEP WHILE SITTING AND READING: WOULD NEVER DOZE
HOW LIKELY ARE YOU TO NOD OFF OR FALL ASLEEP IN A CAR, WHILE STOPPED FOR A FEW MINUTES IN TRAFFIC: WOULD NEVER DOZE
HOW LIKELY ARE YOU TO NOD OFF OR FALL ASLEEP WHILE SITTING AND TALKING TO SOMEONE: WOULD NEVER DOZE
HOW LIKELY ARE YOU TO NOD OFF OR FALL ASLEEP WHILE WATCHING TV: SLIGHT CHANCE OF DOZING
HOW LIKELY ARE YOU TO NOD OFF OR FALL ASLEEP WHILE LYING DOWN TO REST IN THE AFTERNOON WHEN CIRCUMSTANCES PERMIT: SLIGHT CHANCE OF DOZING
HOW LIKELY ARE YOU TO NOD OFF OR FALL ASLEEP WHILE SITTING AND READING: WOULD NEVER DOZE
HOW LIKELY ARE YOU TO NOD OFF OR FALL ASLEEP WHILE LYING DOWN TO REST IN THE AFTERNOON WHEN CIRCUMSTANCES PERMIT: SLIGHT CHANCE OF DOZING
ESS TOTAL SCORE: 2
HOW LIKELY ARE YOU TO NOD OFF OR FALL ASLEEP WHILE SITTING QUIETLY AFTER LUNCH WITHOUT ALCOHOL: WOULD NEVER DOZE
HOW LIKELY ARE YOU TO NOD OFF OR FALL ASLEEP WHILE SITTING INACTIVE IN A PUBLIC PLACE: WOULD NEVER DOZE
HOW LIKELY ARE YOU TO NOD OFF OR FALL ASLEEP WHILE SITTING AND TALKING TO SOMEONE: WOULD NEVER DOZE
HOW LIKELY ARE YOU TO NOD OFF OR FALL ASLEEP WHEN YOU ARE A PASSENGER IN A CAR FOR AN HOUR WITHOUT A BREAK: WOULD NEVER DOZE

## 2024-06-17 ENCOUNTER — OFFICE VISIT (OUTPATIENT)
Dept: PULMONOLOGY | Age: 69
End: 2024-06-17
Payer: COMMERCIAL

## 2024-06-17 VITALS — OXYGEN SATURATION: 96 % | SYSTOLIC BLOOD PRESSURE: 119 MMHG | DIASTOLIC BLOOD PRESSURE: 85 MMHG | HEART RATE: 100 BPM

## 2024-06-17 DIAGNOSIS — J44.9 COPD, SEVERE (HCC): ICD-10-CM

## 2024-06-17 DIAGNOSIS — I50.22 CHRONIC SYSTOLIC HEART FAILURE (HCC): ICD-10-CM

## 2024-06-17 DIAGNOSIS — E66.01 CLASS 2 SEVERE OBESITY DUE TO EXCESS CALORIES WITH SERIOUS COMORBIDITY AND BODY MASS INDEX (BMI) OF 39.0 TO 39.9 IN ADULT (HCC): ICD-10-CM

## 2024-06-17 DIAGNOSIS — I42.9 PRIMARY CARDIOMYOPATHY (HCC): ICD-10-CM

## 2024-06-17 DIAGNOSIS — G47.33 OSA TREATED WITH BIPAP: Primary | ICD-10-CM

## 2024-06-17 PROCEDURE — 99214 OFFICE O/P EST MOD 30 MIN: CPT | Performed by: NURSE PRACTITIONER

## 2024-06-17 PROCEDURE — 1123F ACP DISCUSS/DSCN MKR DOCD: CPT | Performed by: NURSE PRACTITIONER

## 2024-06-17 PROCEDURE — G2211 COMPLEX E/M VISIT ADD ON: HCPCS | Performed by: NURSE PRACTITIONER

## 2024-06-17 NOTE — ASSESSMENT & PLAN NOTE
Chronic - Stable: Reviewed and analyzed results of physiologic download from patient's machine and reviewed with patients. Supplies and parts as needed for the machine. These are medically necessary. Limit caffeine use after 3 PM. Based on the analyzed data, we will continue with current settings. At this time she needs to start using the machine consistently. Her AHI and symptoms are controlled when she sleeps with the machine. Will continue with the current settings as she feels comfortable with the current settings and her AHI/symptoms are controlled with the current settings. She will work on compliance and will return in 6 mo for follow up. Instructed her to return sooner or contact the office if experiences new or worsening of symptoms. Provided a copy of a prescription for supplies in case she needs to purchase them online. Encouraged her to start using the machine each night, all night. Encouraged the patient to contact the office with any questions or concerns.    Discussed the importance of consistence use of the machine and encouraged consistent use of the machine each night. Also discussed the importance of treating Obstructive Sleep Apnea from a physiological standpoint. Instructed not to drive unless had 4 hours of effective therapy for SINTIA the night before. Did review the risks of under or untreated SINTIA including, but not limited to, higher risks of motor vehicle accidents, stroke, heart attacks, and death. Patients verbalized understanding and accepts all these risks.

## 2024-06-17 NOTE — PROGRESS NOTES
Instructions    acetaminophen (TYLENOL) 650 mg, Oral, EVERY 6 HOURS PRN,      albuterol sulfate HFA (PROVENTIL HFA) 108 (90 Base) MCG/ACT inhaler 2 puffs, Inhalation, EVERY 4 HOURS PRN    aspirin EC 81 mg, Oral, DAILY    atorvastatin (LIPITOR) 20 mg, Oral, DAILY    BiPAP Machine MISC Does not apply    carvedilol (COREG) 12.5 MG tablet TAKE ONE TABLET BY MOUTH TWICE A DAY    doxycycline hyclate (VIBRAMYCIN) 100 MG capsule TAKE 1 CAPSULE EVERY MONDAY, WEDNESDAY, AND FRIDAY    fluticasone (FLONASE) 50 MCG/ACT nasal spray 2 sprays, Nasal, DAILY    fluticasone furoate-vilanterol (BREO ELLIPTA) 200-25 MCG/ACT AEPB inhaler TAKE 1 PUFF BY MOUTH EVERY DAY    furosemide (LASIX) 40 mg, Oral, DAILY    metFORMIN (GLUCOPHAGE) 500 mg, Oral, 2 TIMES DAILY WITH MEALS    Multiple Vitamins-Minerals (ICAPS AREDS 2 PO) Oral    Multiple Vitamins-Minerals (MULTIVITAMIN ADULT PO) Oral    nystatin (MYCOSTATIN) 118513 UNIT/GM ointment Topical, PRN, Apply topically 2 times daily.    nystatin (MYCOSTATIN) 498330 UNIT/GM powder Topical, PRN, Apply topically 4 times daily.    sacubitril-valsartan (ENTRESTO)  MG per tablet 1 tablet, Oral, 2 TIMES DAILY    spironolactone (ALDACTONE) 25 mg, Oral, DAILY    tiotropium (SPIRIVA RESPIMAT) 2.5 MCG/ACT AERS inhaler 2 puffs, Inhalation, DAILY RESP        Objective   Vitals:  Weight BMI   Wt Readings from Last 3 Encounters:   05/31/24 108 kg (238 lb)   12/18/23 108.3 kg (238 lb 12.8 oz)   12/11/23 107 kg (236 lb)    There is no height or weight on file to calculate BMI.     BP HR SaO2   BP Readings from Last 3 Encounters:   06/17/24 119/85   05/31/24 112/64   12/18/23 124/70    Pulse Readings from Last 3 Encounters:   06/17/24 100   05/31/24 67   12/18/23 68    SpO2 Readings from Last 3 Encounters:   06/17/24 96%   05/31/24 91%   12/18/23 96%         Electronically signed by JEYSON Bowie CNP on 6/17/2024 at 5:34 PM

## 2024-06-17 NOTE — PROGRESS NOTES
Diagnosis: [x] SINTIA (G47.33) [] CSA (G47.31) [] Apnea (G47.30)   Length of Need: [x] 18 Months [] 99 Months [] Other:   Machine (YULISA!): [] Respironics Dream Station   2   Auto [] ResMed AirSense     Auto 11 [] Other:     []  CPAP () [] Bilevel ()   Mode: [] Auto [] Spontaneous    Mode: [] Auto [] Spontaneous             Comfort Settings:      Humidifier: [] Heated ()        [x] Water chamber replacement ()/ 1 per 6 months        Mask:   [x] Nasal () /1 per 3 months [] Full Face () /1 per 3 months   [x] Patient choice -Size and fit mask [] Patient Choice - Size and fit mask   [] Dispense: [] Dispense:   [x] Headgear () / 1 per 3 months [] Headgear () / 1 per 3 months   [] Replacement Nasal Cushion ()/2 per month [] Interface Replacement ()/1 per month   [x] Replacement Nasal Pillows ()/2 per month         Tubing: [x] Heated ()/1 per 3 months    [] Standard ()/1 per 3 months [] Other:           Filters: [x] Non-disposable ()/1 per 6 months     [x] Ultra-Fine, Disposable ()/2 per month        Miscellaneous: [] Chin Strap ()/ 1 per 6 months [] O2 bleed-in:        LPM   [] Oxymetry on CPAP/Bilevel [x]  Other: Modem: ()         Start Order Date: 24    MEDICAL JUSTIFICATION:  I, the undersigned, certify that the above prescribed supplies are medically necessary for this patient’s wellbeing.  In my opinion, the supplies are both reasonable and necessary in reference to accepted standards of medicalpractice in treatment of this patient’s condition.    Kellee Mcdowell CNP    NPI: [id number]     Order Signed Date: 24    Adrianne Gamez  1955  [address] [place] [address]  [phone] (home)   [phone] (mobile)      Insurance Info (confirm with patient if correct):  Payer/Plan Subscr  Sex Relation Sub. Ins. ID Effective Group Num

## 2024-07-19 NOTE — TELEPHONE ENCOUNTER
Requested Prescriptions     Pending Prescriptions Disp Refills    sacubitril-valsartan (ENTRESTO)  MG per tablet 60 tablet 11     Sig: Take 1 tablet by mouth 2 times daily      [id number]      Last OV:  12/11/2023 [facility]    Next OV: 8/13/2024 [facility]    Last Labs: bmp 07/19/2023     Last Filled: 06/05/2023 MORENITA

## 2024-07-31 RX ORDER — SPIRONOLACTONE 25 MG/1
25 TABLET ORAL DAILY
Qty: 90 TABLET | Refills: 1 | Status: SHIPPED | OUTPATIENT
Start: 2024-07-31

## 2024-07-31 NOTE — TELEPHONE ENCOUNTER
Received refill request for spironolactone (ALDACTONE) 25 MG  from [facility] pharmacy.     Last OV: 8/16/2023 [facility], 12/11/2023 [facility]    Next OV: 8/13/2024 RMM    Last Labs: CMP 12/15/2023    Last Filled: 10/11/2023 [facility]PS[facility]

## 2024-08-08 NOTE — PROGRESS NOTES
[facility]   Electrophysiology Follow up   Date: 8/13/2024  I had the privilege of visiting Adrianne Gamez in the office.       CC: CHB   HPI: Adrianne Gamez is a 69 y.o. female  history of Tetrology of Fallot s/p repair in 1963 with a Jazmin-Taussig shunt and VSD howie patch. She had an AICD placed for cardiomyopathy with EF 25% in 2008. Device was dual chamber St. Jose Juan with Durata lead.  She is pacemaker dependant and is being V paced more than 99%. She underwent LHC/RHC at [facility] on July 9, 2015.  Coronaries were normal, and EF reported 35%.      Tetralogy of fallot repair 12/5/1963   Dr Adler; Both Jazmin-Taussig shunts ligated. Extreme infundibular stenosis. Normal cors noted. Repeated aortic cross-clamps noted, none exceeding 12 mins. Transverse incision into RV. Infundibulum extensively resected from PA side as impossible to identify from below. Marked aortic dextroposition noted. Large VSD closed with howie patch. No TAP. Initially CHB.      \"The following is copied from [facility], Dr. Gaspar: \"Myla had surgical repair of tetralogy of Fallot at the of 12 years by Dr. Alder at [facility]. These were in the early days of myocardial protection as well as in the early days of cardiopulmonary bypass. The surgery was notable for the absence of a trans annular patch, and also for a transverse rather short RV incision. The infundibulum was very tightly (3mm) stenosed and repeated bouts of aortic cross clamp were required to deal with this and the VSD closure. This clearly placed her myocardium at risk of ischemic injury. In 2008 she presented with heart block and an ejection fraction off 25%, and was fitted with an endovascular cardio defibrillator with AV sequential pacing ability.\"     She follows Dr. Meyer for SINTIA and also nocturnal hypoxemia.      Due to heart failure and wide paced QRS on 8/26/2015 she had Biv upgrade of her

## 2024-08-13 ENCOUNTER — OFFICE VISIT (OUTPATIENT)
Dept: CARDIOLOGY CLINIC | Age: 69
End: 2024-08-13
Payer: COMMERCIAL

## 2024-08-13 ENCOUNTER — NURSE ONLY (OUTPATIENT)
Dept: CARDIOLOGY CLINIC | Age: 69
End: 2024-08-13

## 2024-08-13 ENCOUNTER — TELEPHONE (OUTPATIENT)
Dept: CARDIOLOGY CLINIC | Age: 69
End: 2024-08-13

## 2024-08-13 VITALS
WEIGHT: 243 LBS | DIASTOLIC BLOOD PRESSURE: 70 MMHG | OXYGEN SATURATION: 91 % | SYSTOLIC BLOOD PRESSURE: 124 MMHG | BODY MASS INDEX: 40.48 KG/M2 | HEART RATE: 85 BPM | HEIGHT: 65 IN

## 2024-08-13 DIAGNOSIS — G47.33 OSA TREATED WITH BIPAP: Chronic | ICD-10-CM

## 2024-08-13 DIAGNOSIS — I42.8 NON-ISCHEMIC CARDIOMYOPATHY (HCC): ICD-10-CM

## 2024-08-13 DIAGNOSIS — E66.01 CLASS 2 SEVERE OBESITY DUE TO EXCESS CALORIES WITH SERIOUS COMORBIDITY AND BODY MASS INDEX (BMI) OF 39.0 TO 39.9 IN ADULT (HCC): ICD-10-CM

## 2024-08-13 DIAGNOSIS — Z95.810 IMPLANTABLE CARDIOVERTER-DEFIBRILLATOR (ICD) IN SITU: Primary | Chronic | ICD-10-CM

## 2024-08-13 DIAGNOSIS — I50.22 CHRONIC SYSTOLIC HF (HEART FAILURE) (HCC): Primary | ICD-10-CM

## 2024-08-13 DIAGNOSIS — R00.1 BRADYCARDIA: ICD-10-CM

## 2024-08-13 DIAGNOSIS — Z45.02 ICD (IMPLANTABLE CARDIOVERTER-DEFIBRILLATOR) BATTERY DEPLETION: ICD-10-CM

## 2024-08-13 DIAGNOSIS — I44.2 CHB (COMPLETE HEART BLOCK) (HCC): ICD-10-CM

## 2024-08-13 PROCEDURE — 1123F ACP DISCUSS/DSCN MKR DOCD: CPT | Performed by: INTERNAL MEDICINE

## 2024-08-13 PROCEDURE — 93000 ELECTROCARDIOGRAM COMPLETE: CPT | Performed by: INTERNAL MEDICINE

## 2024-08-13 PROCEDURE — 99214 OFFICE O/P EST MOD 30 MIN: CPT | Performed by: INTERNAL MEDICINE

## 2024-08-13 NOTE — TELEPHONE ENCOUNTER
CARDIAC CLEARANCE Pt has appt 08/13/24 with MARIO    What type of procedure are you having?  Cataract Surgery    Which physician is performing your procedure?  Dr. Iva Bowen    When is your procedure scheduled for?  08/15/24 Left eye, 08/29/24 Right eye    Where are you having this procedure?  [facility]    Are you taking Blood Thinners?   If so what? (Name/dose/frequesncy)     Does the surgeon want you to stop your blood thinner?  If so for how long? No,  patient having Fentanyl and Versed    Phone Number and Contact Name for Physicians office: [place]  [phone]    Fax number to send information:  [phone]      Please send office notes and EKG tracings, pt has appointment with LETI 08/13/24

## 2024-08-26 NOTE — PROGRESS NOTES
[facility]      CONSULTATION  [phone]  9/3/24  Referring: Mari Hansen MD (PCP)    REASON FOR CONSULT/CHIEF COMPLAINT/HPI     Reason for visit/ Chief complaint  TOF/CHF   HPI Adrianne Gamez is a 69 y.o. female here for follow up appointment.     She has a history of Tetrology of Fallot s/p repair in 1963 with bilateral classic Jazmin-Taussig shunts and VSD howie patch. She had an AICD placed for cardiomyopathy with EF 25% in 2008. Due to heart failure and wide paced QRS on 8/26/2015 she had Biv upgrade of her device. Upgrade was very challenging and required snaring of the LV lead.      S/p Gen change 10/14/2020    LHC/RHC done at [facility] in 2015 showed normal coronaries and no evidence of constrction.  LVEF 40% by LV gram at that time.    She has severe COPD and restrictive lung disease, follows with Dr. Meyer for this.     ARB changed to Entresto and added Jardiance.  She tolerated this well.  She remains NYHA II.  She previously reported that she can't tell a difference since we started her on the Jardiance, Entresto and Aldactone. Jardiance was stopped last visit due to yeast infection.    She had an echo in July which shows moderately dilated RV. Optival shows increased impedence in May and July but back to normal now.      Today she is doing well. She stopped taking her Jardiance due to yeast infections.  She was able to lose about 18 lbs on Jardiance . She has upcoming cataract surgery this Thursday. No complaints of CP, SOB, palpitations, dizziness, PND, or orthopnea at this time.     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     Patient is adherent with medications and is tolerating them well without side effects     HISTORY/ALLERGIES/ROS     MedHx:  has a past medical history of Bradycardia, CHF (congestive heart failure) ([facility]), COPD (chronic obstructive pulmonary disease) (HCC), Hyperlipidemia, Hypertension, SINTIA treated with

## 2024-09-03 ENCOUNTER — OFFICE VISIT (OUTPATIENT)
Dept: CARDIOLOGY CLINIC | Age: 69
End: 2024-09-03
Payer: COMMERCIAL

## 2024-09-03 VITALS
WEIGHT: 238.2 LBS | DIASTOLIC BLOOD PRESSURE: 88 MMHG | HEART RATE: 71 BPM | HEIGHT: 66 IN | BODY MASS INDEX: 38.28 KG/M2 | OXYGEN SATURATION: 96 % | SYSTOLIC BLOOD PRESSURE: 130 MMHG

## 2024-09-03 DIAGNOSIS — Z45.02 ICD (IMPLANTABLE CARDIOVERTER-DEFIBRILLATOR) BATTERY DEPLETION: ICD-10-CM

## 2024-09-03 DIAGNOSIS — Z87.74 S/P TOF (TETRALOGY OF FALLOT) REPAIR: Primary | ICD-10-CM

## 2024-09-03 DIAGNOSIS — E11.9 TYPE 2 DIABETES MELLITUS WITHOUT COMPLICATION, WITHOUT LONG-TERM CURRENT USE OF INSULIN (HCC): ICD-10-CM

## 2024-09-03 DIAGNOSIS — I37.0 PULMONARY VALVE STENOSIS, UNSPECIFIED ETIOLOGY: ICD-10-CM

## 2024-09-03 DIAGNOSIS — E78.2 MIXED HYPERLIPIDEMIA: ICD-10-CM

## 2024-09-03 DIAGNOSIS — G47.33 OSA TREATED WITH BIPAP: ICD-10-CM

## 2024-09-03 DIAGNOSIS — I50.22 CHRONIC SYSTOLIC HF (HEART FAILURE) (HCC): ICD-10-CM

## 2024-09-03 PROCEDURE — 1123F ACP DISCUSS/DSCN MKR DOCD: CPT | Performed by: INTERNAL MEDICINE

## 2024-09-03 PROCEDURE — G2211 COMPLEX E/M VISIT ADD ON: HCPCS | Performed by: INTERNAL MEDICINE

## 2024-09-03 PROCEDURE — 99214 OFFICE O/P EST MOD 30 MIN: CPT | Performed by: INTERNAL MEDICINE

## 2024-09-03 NOTE — PATIENT INSTRUCTIONS
Follow up with Dr Friedman in 9 months     You can speak to your PCP about Ozempic or Mounjaro to help with weight loss and diabetes.     Call for any questions or concerns.

## 2024-09-04 ENCOUNTER — TELEPHONE (OUTPATIENT)
Dept: PULMONOLOGY | Age: 69
End: 2024-09-04

## 2024-09-04 DIAGNOSIS — J44.9 COPD, SEVERE (HCC): Primary | ICD-10-CM

## 2024-09-04 RX ORDER — ALBUTEROL SULFATE 90 UG/1
2 AEROSOL, METERED RESPIRATORY (INHALATION) EVERY 4 HOURS PRN
Qty: 1 EACH | Refills: 1 | Status: SHIPPED | OUTPATIENT
Start: 2024-09-04

## 2024-09-04 RX ORDER — ALBUTEROL SULFATE 90 UG/1
2 AEROSOL, METERED RESPIRATORY (INHALATION) EVERY 4 HOURS PRN
Qty: 1 EACH | Refills: 5 | Status: CANCELLED | OUTPATIENT
Start: 2024-09-04

## 2024-09-04 NOTE — TELEPHONE ENCOUNTER
Pt needs refills on:    albuterol sulfate HFA (PROVENTIL HFA) 108 (90 Base) MCG/ACT inhaler     Send to:    [facility]

## 2024-09-16 ENCOUNTER — TELEPHONE (OUTPATIENT)
Dept: PULMONOLOGY | Age: 69
End: 2024-09-16

## 2024-09-16 DIAGNOSIS — J98.4 RESTRICTIVE LUNG DISEASE: ICD-10-CM

## 2024-09-16 DIAGNOSIS — J44.9 COPD, SEVERE (HCC): Primary | ICD-10-CM

## 2024-09-16 RX ORDER — FLUTICASONE FUROATE AND VILANTEROL 200; 25 UG/1; UG/1
POWDER RESPIRATORY (INHALATION)
Qty: 180 EACH | Refills: 3 | Status: SHIPPED | OUTPATIENT
Start: 2024-09-16 | End: 2024-09-17

## 2024-09-17 DIAGNOSIS — J44.9 COPD, SEVERE (HCC): ICD-10-CM

## 2024-09-17 DIAGNOSIS — J98.4 RESTRICTIVE LUNG DISEASE: ICD-10-CM

## 2024-09-17 RX ORDER — BUDESONIDE AND FORMOTEROL FUMARATE DIHYDRATE 80; 4.5 UG/1; UG/1
AEROSOL RESPIRATORY (INHALATION)
Qty: 1 EACH | Refills: 5 | Status: SHIPPED | OUTPATIENT
Start: 2024-09-17

## 2024-10-14 PROCEDURE — 93297 REM INTERROG DEV EVAL ICPMS: CPT | Performed by: NURSE PRACTITIONER

## 2024-10-24 DIAGNOSIS — J98.4 RESTRICTIVE LUNG DISEASE: ICD-10-CM

## 2024-10-24 DIAGNOSIS — J44.9 COPD, SEVERE (HCC): Primary | ICD-10-CM

## 2024-10-24 RX ORDER — DOXYCYCLINE 100 MG/1
CAPSULE ORAL
Qty: 12 CAPSULE | Refills: 5 | Status: SHIPPED | OUTPATIENT
Start: 2024-10-24

## 2024-12-13 ENCOUNTER — OFFICE VISIT (OUTPATIENT)
Dept: PULMONOLOGY | Age: 69
End: 2024-12-13
Payer: MEDICARE

## 2024-12-13 VITALS
BODY MASS INDEX: 38.86 KG/M2 | HEIGHT: 66 IN | SYSTOLIC BLOOD PRESSURE: 122 MMHG | WEIGHT: 241.8 LBS | HEART RATE: 88 BPM | OXYGEN SATURATION: 93 % | DIASTOLIC BLOOD PRESSURE: 74 MMHG

## 2024-12-13 DIAGNOSIS — I50.22 CHRONIC SYSTOLIC HF (HEART FAILURE) (HCC): ICD-10-CM

## 2024-12-13 DIAGNOSIS — J44.9 COPD, SEVERE (HCC): Primary | Chronic | ICD-10-CM

## 2024-12-13 DIAGNOSIS — Z23 FLU VACCINE NEED: ICD-10-CM

## 2024-12-13 DIAGNOSIS — J98.6 ELEVATED DIAPHRAGM: ICD-10-CM

## 2024-12-13 DIAGNOSIS — J98.4 RESTRICTIVE LUNG DISEASE: ICD-10-CM

## 2024-12-13 DIAGNOSIS — G47.33 OSA TREATED WITH BIPAP: Chronic | ICD-10-CM

## 2024-12-13 PROCEDURE — G8417 CALC BMI ABV UP PARAM F/U: HCPCS | Performed by: INTERNAL MEDICINE

## 2024-12-13 PROCEDURE — 3023F SPIROM DOC REV: CPT | Performed by: INTERNAL MEDICINE

## 2024-12-13 PROCEDURE — G8484 FLU IMMUNIZE NO ADMIN: HCPCS | Performed by: INTERNAL MEDICINE

## 2024-12-13 PROCEDURE — 1036F TOBACCO NON-USER: CPT | Performed by: INTERNAL MEDICINE

## 2024-12-13 PROCEDURE — 1159F MED LIST DOCD IN RCRD: CPT | Performed by: INTERNAL MEDICINE

## 2024-12-13 PROCEDURE — G8400 PT W/DXA NO RESULTS DOC: HCPCS | Performed by: INTERNAL MEDICINE

## 2024-12-13 PROCEDURE — 3017F COLORECTAL CA SCREEN DOC REV: CPT | Performed by: INTERNAL MEDICINE

## 2024-12-13 PROCEDURE — 1090F PRES/ABSN URINE INCON ASSESS: CPT | Performed by: INTERNAL MEDICINE

## 2024-12-13 PROCEDURE — 99214 OFFICE O/P EST MOD 30 MIN: CPT | Performed by: INTERNAL MEDICINE

## 2024-12-13 PROCEDURE — 1123F ACP DISCUSS/DSCN MKR DOCD: CPT | Performed by: INTERNAL MEDICINE

## 2024-12-13 PROCEDURE — G0008 ADMIN INFLUENZA VIRUS VAC: HCPCS | Performed by: INTERNAL MEDICINE

## 2024-12-13 PROCEDURE — 1160F RVW MEDS BY RX/DR IN RCRD: CPT | Performed by: INTERNAL MEDICINE

## 2024-12-13 PROCEDURE — 90653 IIV ADJUVANT VACCINE IM: CPT | Performed by: INTERNAL MEDICINE

## 2024-12-13 PROCEDURE — G8427 DOCREV CUR MEDS BY ELIG CLIN: HCPCS | Performed by: INTERNAL MEDICINE

## 2024-12-13 NOTE — PROGRESS NOTES
[facility]  Progress Note  Chandana Meyer MD       Adrianne Gamez   YOB: 1955    Date of Visit:  12/13/2024    Assessment/Plan:  1. COPD, severe (HCC)/SOB  PFT 10/15:  Spirometry reveals decreased FVC at 1.95 L, which is 60% predicted. FEV1 isndecreased at 1.27 L, which is 49% predicted. FEV1/FVC ratio is decreased at 66%. There is no significant change after inhaled bronchodilators. Lung volumes reveal decreased total lung capacity at 71% predicted. Vital capacity is decreased at 60% predicted. Diffusion capacity is decreased at 53% predicted.     IMPRESSION: Combined severe obstructive and moderate restrictive lung  disease    Medication Management:  The patient benefits from the medical regimen and reports no complications.    Breo 200 ==> Breyna 2/2 insurance  Spiriva Respimat (Anoro made her hoarse)  Albuterol prn  Doxycycline MWF    2. Elevated diaphragm  Chronic  Stable    3. SINTIA treated with BiPAP  BiPAP  Stable    4. Cardiomyopathy (HCC)  EF 25%  Has had some med adjustment that has helped  Sees Dr Segovia  Needs her pacer replaced.    5. Restrictive lung disease  2/2 obesity    Flu shot given    FOLLOW UP: 6 months    HPI  The patient presents with a chief complaint of moderate shortness of breath related to severe COPD of many years duration. He has mild associated cough. Exertion is a modifying factor.  She also has HFrEF and sees Dr Segovia.   She has been stable since her last visit.    She has trouble with care for her mother.  She has retired    She has not had any recent flareups.      Review of Systems  No Chest pain, Nausea or vomiting reported      Allergies   Allergen Reactions    Lisinopril      Cough     Prior to Visit Medications    Medication Sig Taking? Authorizing Provider   doxycycline hyclate (VIBRAMYCIN) 100 MG capsule TAKE 1 CAPSULE EVERY MONDAY, WEDNESDAY, AND FRIDAY Yes Chandana Meyer MD   budesonide-formoterol

## 2024-12-23 ENCOUNTER — OFFICE VISIT (OUTPATIENT)
Dept: PULMONOLOGY | Age: 69
End: 2024-12-23
Payer: MEDICARE

## 2024-12-23 VITALS
HEIGHT: 66 IN | BODY MASS INDEX: 38.57 KG/M2 | DIASTOLIC BLOOD PRESSURE: 76 MMHG | OXYGEN SATURATION: 95 % | HEART RATE: 88 BPM | SYSTOLIC BLOOD PRESSURE: 118 MMHG | WEIGHT: 240 LBS

## 2024-12-23 DIAGNOSIS — I42.9 PRIMARY CARDIOMYOPATHY (HCC): ICD-10-CM

## 2024-12-23 DIAGNOSIS — I50.22 CHRONIC SYSTOLIC HF (HEART FAILURE) (HCC): ICD-10-CM

## 2024-12-23 DIAGNOSIS — E66.812 CLASS 2 SEVERE OBESITY DUE TO EXCESS CALORIES WITH SERIOUS COMORBIDITY AND BODY MASS INDEX (BMI) OF 38.0 TO 38.9 IN ADULT: ICD-10-CM

## 2024-12-23 DIAGNOSIS — E66.01 CLASS 2 SEVERE OBESITY DUE TO EXCESS CALORIES WITH SERIOUS COMORBIDITY AND BODY MASS INDEX (BMI) OF 38.0 TO 38.9 IN ADULT: ICD-10-CM

## 2024-12-23 DIAGNOSIS — G47.33 OSA TREATED WITH BIPAP: Primary | ICD-10-CM

## 2024-12-23 DIAGNOSIS — J44.9 COPD, SEVERE (HCC): ICD-10-CM

## 2024-12-23 PROCEDURE — G8482 FLU IMMUNIZE ORDER/ADMIN: HCPCS | Performed by: NURSE PRACTITIONER

## 2024-12-23 PROCEDURE — 3017F COLORECTAL CA SCREEN DOC REV: CPT | Performed by: NURSE PRACTITIONER

## 2024-12-23 PROCEDURE — 1036F TOBACCO NON-USER: CPT | Performed by: NURSE PRACTITIONER

## 2024-12-23 PROCEDURE — G8427 DOCREV CUR MEDS BY ELIG CLIN: HCPCS | Performed by: NURSE PRACTITIONER

## 2024-12-23 PROCEDURE — G8400 PT W/DXA NO RESULTS DOC: HCPCS | Performed by: NURSE PRACTITIONER

## 2024-12-23 PROCEDURE — 3023F SPIROM DOC REV: CPT | Performed by: NURSE PRACTITIONER

## 2024-12-23 PROCEDURE — G2211 COMPLEX E/M VISIT ADD ON: HCPCS | Performed by: NURSE PRACTITIONER

## 2024-12-23 PROCEDURE — 1123F ACP DISCUSS/DSCN MKR DOCD: CPT | Performed by: NURSE PRACTITIONER

## 2024-12-23 PROCEDURE — 1090F PRES/ABSN URINE INCON ASSESS: CPT | Performed by: NURSE PRACTITIONER

## 2024-12-23 PROCEDURE — G8417 CALC BMI ABV UP PARAM F/U: HCPCS | Performed by: NURSE PRACTITIONER

## 2024-12-23 PROCEDURE — 1160F RVW MEDS BY RX/DR IN RCRD: CPT | Performed by: NURSE PRACTITIONER

## 2024-12-23 PROCEDURE — 1159F MED LIST DOCD IN RCRD: CPT | Performed by: NURSE PRACTITIONER

## 2024-12-23 PROCEDURE — 99214 OFFICE O/P EST MOD 30 MIN: CPT | Performed by: NURSE PRACTITIONER

## 2024-12-23 ASSESSMENT — SLEEP AND FATIGUE QUESTIONNAIRES
HOW LIKELY ARE YOU TO NOD OFF OR FALL ASLEEP WHILE SITTING INACTIVE IN A PUBLIC PLACE: WOULD NEVER DOZE
HOW LIKELY ARE YOU TO NOD OFF OR FALL ASLEEP WHILE SITTING AND TALKING TO SOMEONE: WOULD NEVER DOZE
HOW LIKELY ARE YOU TO NOD OFF OR FALL ASLEEP WHILE SITTING QUIETLY AFTER LUNCH WITHOUT ALCOHOL: WOULD NEVER DOZE
HOW LIKELY ARE YOU TO NOD OFF OR FALL ASLEEP IN A CAR, WHILE STOPPED FOR A FEW MINUTES IN TRAFFIC: WOULD NEVER DOZE
ESS TOTAL SCORE: 4
HOW LIKELY ARE YOU TO NOD OFF OR FALL ASLEEP WHEN YOU ARE A PASSENGER IN A CAR FOR AN HOUR WITHOUT A BREAK: SLIGHT CHANCE OF DOZING
HOW LIKELY ARE YOU TO NOD OFF OR FALL ASLEEP WHILE SITTING AND READING: SLIGHT CHANCE OF DOZING
HOW LIKELY ARE YOU TO NOD OFF OR FALL ASLEEP WHILE WATCHING TV: SLIGHT CHANCE OF DOZING
HOW LIKELY ARE YOU TO NOD OFF OR FALL ASLEEP WHILE LYING DOWN TO REST IN THE AFTERNOON WHEN CIRCUMSTANCES PERMIT: SLIGHT CHANCE OF DOZING

## 2024-12-23 NOTE — PROGRESS NOTES
Diagnosis: [x] SINTIA (G47.33) [] CSA (G47.31) [] Apnea (G47.30)   Length of Need: [x] 18 Months [] 99 Months [] Other:   Machine (YULISA!): [] Respironics Dream Station   2   Auto [] ResMed AirSense     Auto 11 [] Other:     []  CPAP () [] Bilevel ()   Mode: [] Auto [] Spontaneous    Mode: [] Auto [] Spontaneous             Comfort Settings:      Humidifier: [] Heated ()        [x] Water chamber replacement ()/ 1 per 6 months        Mask:   [x] Nasal () /1 per 3 months [] Full Face () /1 per 3 months   [x] Patient choice -Size and fit mask [] Patient Choice - Size and fit mask   [] Dispense: [] Dispense:   [x] Headgear () / 1 per 3 months [] Headgear () / 1 per 3 months   [x] Replacement Nasal Cushion ()/2 per month [] Interface Replacement ()/1 per month   [x] Replacement Nasal Pillows ()/2 per month         Tubing: [x] Heated ()/1 per 3 months    [] Standard ()/1 per 3 months [] Other:           Filters: [x] Non-disposable ()/1 per 6 months     [x] Ultra-Fine, Disposable ()/2 per month        Miscellaneous: [] Chin Strap ()/ 1 per 6 months [] O2 bleed-in:        LPM   [] Oxymetry on CPAP/Bilevel [x]  Other: Modem: ()         Start Order Date: 24    MEDICAL JUSTIFICATION:  I, the undersigned, certify that the above prescribed supplies are medically necessary for this patient’s wellbeing.  In my opinion, the supplies are both reasonable and necessary in reference to accepted standards of medicalpractice in treatment of this patient’s condition.    Kellee Mcdowell CNP    NPI: [id number]     Order Signed Date: 24    Adrianne Gamez  1955  [address] [place] [id number]  [phone] (home)   [phone] (mobile)      Insurance Info (confirm with patient if correct):  Payer/Plan Subscr  Sex Relation Sub. Ins. ID Effective Group Num

## 2024-12-23 NOTE — ASSESSMENT & PLAN NOTE
Chronic - Stable: Reviewed and analyzed results of physiologic download from patient's machine and reviewed with patients. Supplies and parts as needed for the machine. These are medically necessary. Limit caffeine use after 3 PM. Based on the analyzed data, we will continue with current settings. Continues to do well with her machine. She is compliant and getting good symptom control. Stable on the current settings and she is getting benefit from using the machine. Will see her back in one year for follow up. A copy of prescription for supplies was provided. Instructed her to return sooner or contact the office if experiences new or worsening of symptoms. Encouraged her to continue to use her machine each night, all night.

## 2024-12-23 NOTE — PROGRESS NOTES
12/23/24 88   12/13/24 88   09/03/24 71    SpO2 Readings from Last 3 Encounters:   12/23/24 95%   12/13/24 93%   09/03/24 96%         Electronically signed by JEYSON Bowie CNP on 12/23/2024 at 9:32 AM

## 2025-01-08 ENCOUNTER — TELEPHONE (OUTPATIENT)
Dept: CARDIOLOGY CLINIC | Age: 70
End: 2025-01-08

## 2025-01-08 RX ORDER — VALSARTAN 320 MG/1
320 TABLET ORAL DAILY
Qty: 90 TABLET | Refills: 3 | Status: SHIPPED | OUTPATIENT
Start: 2025-01-08

## 2025-01-08 NOTE — TELEPHONE ENCOUNTER
Pt called to inform the [facility] that Entresto would cost her $600.00 for 3 mos supply.  Pt is asking is there an alternative med that can be prescribed.  Per Pt she only have 3 days left than she will be out the med. Please call to discuss.  Thank you

## 2025-01-08 NOTE — TELEPHONE ENCOUNTER
Spoke to patient per [facility] message and advised. Pt agreeable to the med change. Discussed new medication instructions. Pt v/u and has no further questions at this time.

## 2025-01-15 PROCEDURE — 93297 REM INTERROG DEV EVAL ICPMS: CPT | Performed by: CLINICAL NURSE SPECIALIST

## 2025-02-07 ENCOUNTER — TELEPHONE (OUTPATIENT)
Dept: CARDIOLOGY CLINIC | Age: 70
End: 2025-02-07

## 2025-02-07 RX ORDER — FUROSEMIDE 40 MG/1
40 TABLET ORAL DAILY
Qty: 90 TABLET | Refills: 3 | Status: SHIPPED | OUTPATIENT
Start: 2025-02-07

## 2025-02-07 RX ORDER — SPIRONOLACTONE 25 MG/1
25 TABLET ORAL DAILY
Qty: 90 TABLET | Refills: 1 | Status: SHIPPED | OUTPATIENT
Start: 2025-02-07

## 2025-02-07 NOTE — TELEPHONE ENCOUNTER
Received refill request for Furosemide 40 MG Tablet from [facility] pharmacy.     Last OV: 9/16/2024 [facility]    Next OV: None    Last Labs: 12/15/2023 CMP    Last Filled: 4/24/2024 [facility]   Detail Level: Detailed Quality 47: Advance Care Plan: Advance Care Planning discussed and documented; advance care plan or surrogate decision maker documented in the medical record. Quality 226: Preventive Care And Screening: Tobacco Use: Screening And Cessation Intervention: Patient screened for tobacco use and is an ex/non-smoker Afib

## 2025-02-07 NOTE — TELEPHONE ENCOUNTER
Received refill request for  spironolactone (ALDACTONE) 25 MG tablet  from [facility] pharmacy.     Last OV: 9/16/2024 [facility]    Next OV: None    Last Labs: 12/15/2023 CMP    Last Filled: 7/31/2024 [facility]

## 2025-04-09 DIAGNOSIS — J44.9 COPD, SEVERE (HCC): ICD-10-CM

## 2025-04-09 DIAGNOSIS — J98.4 RESTRICTIVE LUNG DISEASE: ICD-10-CM

## 2025-04-10 RX ORDER — DOXYCYCLINE 100 MG/1
CAPSULE ORAL
Qty: 12 CAPSULE | Refills: 5 | Status: SHIPPED | OUTPATIENT
Start: 2025-04-10

## 2025-04-24 ENCOUNTER — TELEPHONE (OUTPATIENT)
Dept: CARDIOLOGY CLINIC | Age: 70
End: 2025-04-24

## 2025-05-22 NOTE — PROGRESS NOTES
[facility]      Cardiology Follow Up   [phone]  6/20/25  Referring: Mari Hansen MD (PCP)    REASON FOR CONSULT/CHIEF COMPLAINT/HPI     Reason for visit/ Chief complaint  TOF/CHF   HPI Adrianne Gamez is a 69 y.o. female here for follow up.     She has a history of Tetrology of Fallot s/p repair in 1963 with bilateral classic Jazmin-Taussig shunts and VSD howie patch. She had an AICD placed for cardiomyopathy with EF 25% in 2008. Due to heart failure and wide paced QRS on 8/26/2015 she had Biv upgrade of her device. Upgrade was very challenging and required snaring of the LV lead.      S/p Gen change 10/14/2020    LHC/RHC done at [facility] in 2015 showed normal coronaries and no evidence of constrction.  LVEF 40% by LV gram at that time.    She has severe COPD and restrictive lung disease, follows with Dr. Meyer for this.     ARB changed to Entresto and added Jardiance.  She tolerated this well.  She remains NYHA II.  She previously reported that she can't tell a difference since we started her on the Jardiance, Entresto and Aldactone. Jardiance was stopped last visit due to yeast infection.    She had an echo in July which shows moderately dilated RV.     She stopped taking Jardiance due to yeast infections.  She was able to lose about 18 lbs on Jardiance .     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     Today she is doing well. She is living with her mother to take care of her. She is 89 yo. She likes to edie.     No complaints today of CP, SOB, palpitations, dizziness, PND, or orthopnea at this time.     Patient is adherent with medications and is tolerating them well without side effects     HISTORY/ALLERGIES/ROS     MedHx:  has a past medical history of Bradycardia, CHF (congestive heart failure) ([facility]), COPD (chronic obstructive pulmonary disease) ([facility]), Hyperlipidemia, Hypertension, SINTIA treated with BiPAP, Stroke, hemorrhagic ([facility]), and Type II or

## 2025-06-05 PROBLEM — I37.0 PULMONARY VALVE STENOSIS: Status: ACTIVE | Noted: 2025-06-05

## 2025-06-13 PROCEDURE — 93296 REM INTERROG EVL PM/IDS: CPT | Performed by: INTERNAL MEDICINE

## 2025-06-13 PROCEDURE — 93295 DEV INTERROG REMOTE 1/2/MLT: CPT | Performed by: INTERNAL MEDICINE

## 2025-06-20 ENCOUNTER — OFFICE VISIT (OUTPATIENT)
Dept: CARDIOLOGY CLINIC | Age: 70
End: 2025-06-20
Payer: MEDICARE

## 2025-06-20 ENCOUNTER — OFFICE VISIT (OUTPATIENT)
Dept: PULMONOLOGY | Age: 70
End: 2025-06-20
Payer: MEDICARE

## 2025-06-20 VITALS
BODY MASS INDEX: 38.8 KG/M2 | SYSTOLIC BLOOD PRESSURE: 138 MMHG | HEIGHT: 66 IN | WEIGHT: 241.4 LBS | HEART RATE: 76 BPM | OXYGEN SATURATION: 93 % | DIASTOLIC BLOOD PRESSURE: 77 MMHG

## 2025-06-20 VITALS
DIASTOLIC BLOOD PRESSURE: 72 MMHG | OXYGEN SATURATION: 93 % | WEIGHT: 241.2 LBS | SYSTOLIC BLOOD PRESSURE: 122 MMHG | HEART RATE: 76 BPM | HEIGHT: 66 IN | BODY MASS INDEX: 38.76 KG/M2

## 2025-06-20 DIAGNOSIS — I50.22 CHRONIC SYSTOLIC HEART FAILURE (HCC): ICD-10-CM

## 2025-06-20 DIAGNOSIS — E11.9 TYPE 2 DIABETES MELLITUS WITHOUT COMPLICATION, WITHOUT LONG-TERM CURRENT USE OF INSULIN (HCC): ICD-10-CM

## 2025-06-20 DIAGNOSIS — E78.2 MIXED HYPERLIPIDEMIA: ICD-10-CM

## 2025-06-20 DIAGNOSIS — G47.33 OSA TREATED WITH BIPAP: Chronic | ICD-10-CM

## 2025-06-20 DIAGNOSIS — Z45.02 ICD (IMPLANTABLE CARDIOVERTER-DEFIBRILLATOR) BATTERY DEPLETION: ICD-10-CM

## 2025-06-20 DIAGNOSIS — E66.812 CLASS 2 SEVERE OBESITY DUE TO EXCESS CALORIES WITH SERIOUS COMORBIDITY AND BODY MASS INDEX (BMI) OF 39.0 TO 39.9 IN ADULT (HCC): ICD-10-CM

## 2025-06-20 DIAGNOSIS — J44.9 COPD, SEVERE (HCC): Primary | Chronic | ICD-10-CM

## 2025-06-20 DIAGNOSIS — I50.22 CHRONIC SYSTOLIC HF (HEART FAILURE) (HCC): ICD-10-CM

## 2025-06-20 DIAGNOSIS — I37.0 PULMONARY VALVE STENOSIS, UNSPECIFIED ETIOLOGY: ICD-10-CM

## 2025-06-20 DIAGNOSIS — Z87.74 S/P TOF (TETRALOGY OF FALLOT) REPAIR: Primary | ICD-10-CM

## 2025-06-20 DIAGNOSIS — G47.33 OSA TREATED WITH BIPAP: ICD-10-CM

## 2025-06-20 DIAGNOSIS — I50.22 CHRONIC SYSTOLIC CONGESTIVE HEART FAILURE (HCC): ICD-10-CM

## 2025-06-20 DIAGNOSIS — J98.6 ELEVATED DIAPHRAGM: ICD-10-CM

## 2025-06-20 DIAGNOSIS — J98.4 RESTRICTIVE LUNG DISEASE: ICD-10-CM

## 2025-06-20 DIAGNOSIS — E66.01 CLASS 2 SEVERE OBESITY DUE TO EXCESS CALORIES WITH SERIOUS COMORBIDITY AND BODY MASS INDEX (BMI) OF 39.0 TO 39.9 IN ADULT (HCC): ICD-10-CM

## 2025-06-20 PROCEDURE — G8427 DOCREV CUR MEDS BY ELIG CLIN: HCPCS | Performed by: INTERNAL MEDICINE

## 2025-06-20 PROCEDURE — G2211 COMPLEX E/M VISIT ADD ON: HCPCS | Performed by: INTERNAL MEDICINE

## 2025-06-20 PROCEDURE — 1036F TOBACCO NON-USER: CPT | Performed by: INTERNAL MEDICINE

## 2025-06-20 PROCEDURE — 2022F DILAT RTA XM EVC RTNOPTHY: CPT | Performed by: INTERNAL MEDICINE

## 2025-06-20 PROCEDURE — 99214 OFFICE O/P EST MOD 30 MIN: CPT | Performed by: INTERNAL MEDICINE

## 2025-06-20 PROCEDURE — G8417 CALC BMI ABV UP PARAM F/U: HCPCS | Performed by: INTERNAL MEDICINE

## 2025-06-20 PROCEDURE — 3017F COLORECTAL CA SCREEN DOC REV: CPT | Performed by: INTERNAL MEDICINE

## 2025-06-20 PROCEDURE — G8400 PT W/DXA NO RESULTS DOC: HCPCS | Performed by: INTERNAL MEDICINE

## 2025-06-20 PROCEDURE — 1090F PRES/ABSN URINE INCON ASSESS: CPT | Performed by: INTERNAL MEDICINE

## 2025-06-20 PROCEDURE — 1123F ACP DISCUSS/DSCN MKR DOCD: CPT | Performed by: INTERNAL MEDICINE

## 2025-06-20 PROCEDURE — 1160F RVW MEDS BY RX/DR IN RCRD: CPT | Performed by: INTERNAL MEDICINE

## 2025-06-20 PROCEDURE — 1159F MED LIST DOCD IN RCRD: CPT | Performed by: INTERNAL MEDICINE

## 2025-06-20 PROCEDURE — 3046F HEMOGLOBIN A1C LEVEL >9.0%: CPT | Performed by: INTERNAL MEDICINE

## 2025-06-20 PROCEDURE — 3023F SPIROM DOC REV: CPT | Performed by: INTERNAL MEDICINE

## 2025-06-20 NOTE — PROGRESS NOTES
Vitals:    06/20/25 1112   BP: 122/72   BP Site: Right Upper Arm   Patient Position: Sitting   BP Cuff Size: Large Adult   Pulse: 76   SpO2: 93%   Weight: 109.4 kg (241 lb 3.2 oz)   Height: 1.676 m (5' 6\")       Body mass index is 38.93 kg/m².     Wt Readings from Last 3 Encounters:   06/20/25 109.4 kg (241 lb 3.2 oz)   12/23/24 108.9 kg (240 lb)   12/13/24 109.7 kg (241 lb 12.8 oz)     BP Readings from Last 3 Encounters:   06/20/25 122/72   12/23/24 118/76   12/13/24 122/74        Social History     Tobacco Use   Smoking Status Never    Passive exposure: Never   Smokeless Tobacco Never       Physical Exam:  Well developed, well nourished  Alert and oriented  Sclera is clear  No cervical adenopathy  No JVD.  Chest examination is clear.  Cardiac examination reveals regular rate and rhythm without murmur, gallop or rub.  The abdomen is soft, nontender and nondistended.   There is no clubbing, cyanosis or edema of the extremities.  There is no obvious skin rash.  No focal neuro deficicts  Normal mood and affect

## 2025-07-06 ENCOUNTER — RESULTS FOLLOW-UP (OUTPATIENT)
Dept: CARDIOLOGY CLINIC | Age: 70
End: 2025-07-06

## 2025-08-04 RX ORDER — SPIRONOLACTONE 25 MG/1
25 TABLET ORAL DAILY
Qty: 90 TABLET | Refills: 1 | Status: SHIPPED | OUTPATIENT
Start: 2025-08-04

## 2025-08-14 ENCOUNTER — CLINICAL SUPPORT (OUTPATIENT)
Dept: CARDIOLOGY CLINIC | Age: 70
End: 2025-08-14

## 2025-08-14 ENCOUNTER — OFFICE VISIT (OUTPATIENT)
Dept: CARDIOLOGY CLINIC | Age: 70
End: 2025-08-14
Payer: MEDICARE

## 2025-08-14 VITALS
OXYGEN SATURATION: 92 % | HEART RATE: 74 BPM | BODY MASS INDEX: 38.89 KG/M2 | HEIGHT: 66 IN | WEIGHT: 242 LBS | SYSTOLIC BLOOD PRESSURE: 112 MMHG | DIASTOLIC BLOOD PRESSURE: 70 MMHG

## 2025-08-14 DIAGNOSIS — R00.1 BRADYCARDIA: ICD-10-CM

## 2025-08-14 DIAGNOSIS — I42.8 NON-ISCHEMIC CARDIOMYOPATHY (HCC): ICD-10-CM

## 2025-08-14 DIAGNOSIS — Z95.810 IMPLANTABLE CARDIOVERTER-DEFIBRILLATOR (ICD) IN SITU: Chronic | ICD-10-CM

## 2025-08-14 DIAGNOSIS — I50.22 CHRONIC SYSTOLIC HEART FAILURE (HCC): Primary | ICD-10-CM

## 2025-08-14 DIAGNOSIS — Z45.02 ICD (IMPLANTABLE CARDIOVERTER-DEFIBRILLATOR) BATTERY DEPLETION: Primary | ICD-10-CM

## 2025-08-14 DIAGNOSIS — I42.9 PRIMARY CARDIOMYOPATHY (HCC): Chronic | ICD-10-CM

## 2025-08-14 PROCEDURE — G8400 PT W/DXA NO RESULTS DOC: HCPCS | Performed by: INTERNAL MEDICINE

## 2025-08-14 PROCEDURE — 1090F PRES/ABSN URINE INCON ASSESS: CPT | Performed by: INTERNAL MEDICINE

## 2025-08-14 PROCEDURE — 1036F TOBACCO NON-USER: CPT | Performed by: INTERNAL MEDICINE

## 2025-08-14 PROCEDURE — 1159F MED LIST DOCD IN RCRD: CPT | Performed by: INTERNAL MEDICINE

## 2025-08-14 PROCEDURE — 93000 ELECTROCARDIOGRAM COMPLETE: CPT | Performed by: INTERNAL MEDICINE

## 2025-08-14 PROCEDURE — 99214 OFFICE O/P EST MOD 30 MIN: CPT | Performed by: INTERNAL MEDICINE

## 2025-08-14 PROCEDURE — 3017F COLORECTAL CA SCREEN DOC REV: CPT | Performed by: INTERNAL MEDICINE

## 2025-08-14 PROCEDURE — G8417 CALC BMI ABV UP PARAM F/U: HCPCS | Performed by: INTERNAL MEDICINE

## 2025-08-14 PROCEDURE — 1123F ACP DISCUSS/DSCN MKR DOCD: CPT | Performed by: INTERNAL MEDICINE

## 2025-08-14 PROCEDURE — G8427 DOCREV CUR MEDS BY ELIG CLIN: HCPCS | Performed by: INTERNAL MEDICINE
